# Patient Record
Sex: FEMALE | Race: OTHER | HISPANIC OR LATINO | URBAN - METROPOLITAN AREA
[De-identification: names, ages, dates, MRNs, and addresses within clinical notes are randomized per-mention and may not be internally consistent; named-entity substitution may affect disease eponyms.]

---

## 2017-06-10 ENCOUNTER — EMERGENCY (EMERGENCY)
Facility: HOSPITAL | Age: 47
LOS: 1 days | Discharge: HOME | End: 2017-06-10
Attending: EMERGENCY MEDICINE
Payer: MEDICAID

## 2017-06-10 VITALS
SYSTOLIC BLOOD PRESSURE: 128 MMHG | TEMPERATURE: 98 F | DIASTOLIC BLOOD PRESSURE: 78 MMHG | RESPIRATION RATE: 16 BRPM | OXYGEN SATURATION: 100 % | WEIGHT: 184.97 LBS | HEIGHT: 64 IN | HEART RATE: 65 BPM

## 2017-06-10 VITALS
HEART RATE: 90 BPM | TEMPERATURE: 100 F | DIASTOLIC BLOOD PRESSURE: 723 MMHG | RESPIRATION RATE: 18 BRPM | SYSTOLIC BLOOD PRESSURE: 132 MMHG | OXYGEN SATURATION: 100 %

## 2017-06-10 DIAGNOSIS — I10 ESSENTIAL (PRIMARY) HYPERTENSION: ICD-10-CM

## 2017-06-10 DIAGNOSIS — G43.919 MIGRAINE, UNSPECIFIED, INTRACTABLE, WITHOUT STATUS MIGRAINOSUS: ICD-10-CM

## 2017-06-10 DIAGNOSIS — Z98.89 OTHER SPECIFIED POSTPROCEDURAL STATES: Chronic | ICD-10-CM

## 2017-06-10 DIAGNOSIS — Z98.890 OTHER SPECIFIED POSTPROCEDURAL STATES: ICD-10-CM

## 2017-06-10 DIAGNOSIS — R19.7 DIARRHEA, UNSPECIFIED: ICD-10-CM

## 2017-06-10 PROCEDURE — 36000 PLACE NEEDLE IN VEIN: CPT

## 2017-06-10 PROCEDURE — 99284 EMERGENCY DEPT VISIT MOD MDM: CPT

## 2017-06-10 PROCEDURE — 96372 THER/PROPH/DIAG INJ SC/IM: CPT

## 2017-06-10 PROCEDURE — 99283 EMERGENCY DEPT VISIT LOW MDM: CPT | Mod: 25

## 2017-06-10 RX ORDER — METOCLOPRAMIDE HCL 10 MG
10 TABLET ORAL ONCE
Qty: 0 | Refills: 0 | Status: COMPLETED | OUTPATIENT
Start: 2017-06-10 | End: 2017-06-10

## 2017-06-10 RX ORDER — DIPHENHYDRAMINE HCL 50 MG
50 CAPSULE ORAL ONCE
Qty: 0 | Refills: 0 | Status: COMPLETED | OUTPATIENT
Start: 2017-06-10 | End: 2017-06-10

## 2017-06-10 RX ADMIN — Medication 10 MILLIGRAM(S): at 14:36

## 2017-06-10 RX ADMIN — Medication 50 MILLIGRAM(S): at 15:42

## 2017-06-10 NOTE — ED PROVIDER NOTE - NS ED MD SCRIBE ATTENDING SCRIBE SECTIONS
VITAL SIGNS( Pullset)/PAST MEDICAL/SURGICAL/SOCIAL HISTORY/HISTORY OF PRESENT ILLNESS/HIV/PHYSICAL EXAM/REVIEW OF SYSTEMS/DISPOSITION

## 2017-06-10 NOTE — ED PROVIDER NOTE - CHPI ED SYMPTOMS NEG
no vomiting, CP, SOB, LOC, diaphoresis, dizziness, visual aura, neck stiffness, changes in speech/vision/hearing, numbness, weakness, tingling

## 2017-06-10 NOTE — ED PROVIDER NOTE - OBJECTIVE STATEMENT
46 y/o female with significant PMHx of HTN and migraines presents to the ED c/o L sided HA. Pt reports similar HA in the past; took Tylenol to no relief. Pt also reports nausea and diarrhea. Pt reports mild photo and phonophobia. Pt denies vomiting, CP, SOB, LOC, diaphoresis, dizziness, visual aura, neck stiffness, changes in speech/vision/hearing, numbness, weakness, tingling, use of drugs/ETOH/cigarettes, or any other complaints.

## 2018-04-20 PROBLEM — Z00.00 ENCOUNTER FOR PREVENTIVE HEALTH EXAMINATION: Status: ACTIVE | Noted: 2018-04-20

## 2018-04-24 ENCOUNTER — OUTPATIENT (OUTPATIENT)
Dept: OUTPATIENT SERVICES | Facility: HOSPITAL | Age: 48
LOS: 1 days | End: 2018-04-24

## 2018-04-24 ENCOUNTER — APPOINTMENT (OUTPATIENT)
Dept: OBGYN | Facility: HOSPITAL | Age: 48
End: 2018-04-24
Payer: MEDICAID

## 2018-04-24 ENCOUNTER — LABORATORY RESULT (OUTPATIENT)
Age: 48
End: 2018-04-24

## 2018-04-24 ENCOUNTER — RESULT REVIEW (OUTPATIENT)
Age: 48
End: 2018-04-24

## 2018-04-24 VITALS
SYSTOLIC BLOOD PRESSURE: 111 MMHG | HEART RATE: 76 BPM | HEIGHT: 60 IN | WEIGHT: 176 LBS | DIASTOLIC BLOOD PRESSURE: 69 MMHG | BODY MASS INDEX: 34.55 KG/M2

## 2018-04-24 DIAGNOSIS — Z80.3 FAMILY HISTORY OF MALIGNANT NEOPLASM OF BREAST: ICD-10-CM

## 2018-04-24 DIAGNOSIS — Z86.79 PERSONAL HISTORY OF OTHER DISEASES OF THE CIRCULATORY SYSTEM: ICD-10-CM

## 2018-04-24 DIAGNOSIS — Z98.89 OTHER SPECIFIED POSTPROCEDURAL STATES: Chronic | ICD-10-CM

## 2018-04-24 PROCEDURE — 99203 OFFICE O/P NEW LOW 30 MIN: CPT | Mod: 25,GC

## 2018-04-25 DIAGNOSIS — Z80.3 FAMILY HISTORY OF MALIGNANT NEOPLASM OF BREAST: ICD-10-CM

## 2018-04-25 DIAGNOSIS — Z86.59 PERSONAL HISTORY OF OTHER MENTAL AND BEHAVIORAL DISORDERS: ICD-10-CM

## 2018-04-25 DIAGNOSIS — Z87.19 PERSONAL HISTORY OF OTHER DISEASES OF THE DIGESTIVE SYSTEM: ICD-10-CM

## 2018-04-25 DIAGNOSIS — Z01.419 ENCOUNTER FOR GYNECOLOGICAL EXAMINATION (GENERAL) (ROUTINE) WITHOUT ABNORMAL FINDINGS: ICD-10-CM

## 2018-04-25 DIAGNOSIS — G43.909 MIGRAINE, UNSPECIFIED, NOT INTRACTABLE, WITHOUT STATUS MIGRAINOSUS: ICD-10-CM

## 2018-04-25 DIAGNOSIS — Z86.69 PERSONAL HISTORY OF OTHER DISEASES OF THE NERVOUS SYSTEM AND SENSE ORGANS: ICD-10-CM

## 2018-04-25 DIAGNOSIS — Z86.79 PERSONAL HISTORY OF OTHER DISEASES OF THE CIRCULATORY SYSTEM: ICD-10-CM

## 2018-04-25 LAB
FSH SERPL-MCNC: 1.7 IU/L — SIGNIFICANT CHANGE UP
HBV SURFACE AG SER-ACNC: NONREACTIVE — SIGNIFICANT CHANGE UP
HIV 1+2 AB+HIV1 P24 AG SERPL QL IA: SIGNIFICANT CHANGE UP
HPV HIGH+LOW RISK DNA PNL CVX: SIGNIFICANT CHANGE UP

## 2018-04-26 LAB
C TRACH RRNA SPEC QL NAA+PROBE: SIGNIFICANT CHANGE UP
N GONORRHOEA RRNA SPEC QL NAA+PROBE: SIGNIFICANT CHANGE UP
SPECIMEN SOURCE: SIGNIFICANT CHANGE UP

## 2018-04-27 LAB — CYTOLOGY SPEC DOC CYTO: SIGNIFICANT CHANGE UP

## 2018-05-14 ENCOUNTER — OUTPATIENT (OUTPATIENT)
Dept: OUTPATIENT SERVICES | Facility: HOSPITAL | Age: 48
LOS: 1 days | End: 2018-05-14
Payer: MEDICAID

## 2018-05-14 ENCOUNTER — APPOINTMENT (OUTPATIENT)
Dept: MAMMOGRAPHY | Facility: IMAGING CENTER | Age: 48
End: 2018-05-14

## 2018-05-14 DIAGNOSIS — Z00.8 ENCOUNTER FOR OTHER GENERAL EXAMINATION: ICD-10-CM

## 2018-05-14 DIAGNOSIS — Z98.89 OTHER SPECIFIED POSTPROCEDURAL STATES: Chronic | ICD-10-CM

## 2018-05-14 PROCEDURE — 77063 BREAST TOMOSYNTHESIS BI: CPT

## 2018-05-14 PROCEDURE — 77067 SCR MAMMO BI INCL CAD: CPT | Mod: 26

## 2018-05-14 PROCEDURE — 77063 BREAST TOMOSYNTHESIS BI: CPT | Mod: 26

## 2018-05-14 PROCEDURE — 77067 SCR MAMMO BI INCL CAD: CPT

## 2018-05-29 ENCOUNTER — OUTPATIENT (OUTPATIENT)
Dept: OUTPATIENT SERVICES | Facility: HOSPITAL | Age: 48
LOS: 1 days | End: 2018-05-29

## 2018-05-29 ENCOUNTER — APPOINTMENT (OUTPATIENT)
Dept: NEUROLOGY | Facility: HOSPITAL | Age: 48
End: 2018-05-29

## 2018-05-29 VITALS
SYSTOLIC BLOOD PRESSURE: 119 MMHG | BODY MASS INDEX: 34.95 KG/M2 | DIASTOLIC BLOOD PRESSURE: 71 MMHG | WEIGHT: 178 LBS | HEART RATE: 66 BPM | HEIGHT: 60 IN

## 2018-05-29 DIAGNOSIS — Z98.89 OTHER SPECIFIED POSTPROCEDURAL STATES: Chronic | ICD-10-CM

## 2018-05-29 RX ORDER — PAROXETINE HYDROCHLORIDE 10 MG/1
10 TABLET, FILM COATED ORAL
Refills: 0 | Status: ACTIVE | COMMUNITY

## 2018-05-29 RX ORDER — CLONAZEPAM 1 MG/1
1 TABLET ORAL
Refills: 0 | Status: ACTIVE | COMMUNITY

## 2018-05-29 RX ORDER — RANITIDINE 150 MG/1
150 TABLET ORAL
Refills: 0 | Status: ACTIVE | COMMUNITY

## 2018-05-29 RX ORDER — METOPROLOL TARTRATE 100 MG/1
100 TABLET, FILM COATED ORAL
Refills: 0 | Status: ACTIVE | COMMUNITY

## 2018-05-29 RX ORDER — IBUPROFEN 600 MG/1
600 TABLET, FILM COATED ORAL
Refills: 0 | Status: ACTIVE | COMMUNITY

## 2018-05-29 RX ORDER — CELECOXIB 200 MG/1
200 CAPSULE ORAL
Refills: 0 | Status: ACTIVE | COMMUNITY

## 2018-05-30 DIAGNOSIS — G44.40 DRUG-INDUCED HEADACHE, NOT ELSEWHERE CLASSIFIED, NOT INTRACTABLE: ICD-10-CM

## 2018-05-30 DIAGNOSIS — G43.909 MIGRAINE, UNSPECIFIED, NOT INTRACTABLE, WITHOUT STATUS MIGRAINOSUS: ICD-10-CM

## 2018-05-30 DIAGNOSIS — G43.709 CHRONIC MIGRAINE WITHOUT AURA, NOT INTRACTABLE, WITHOUT STATUS MIGRAINOSUS: ICD-10-CM

## 2018-06-04 ENCOUNTER — RX RENEWAL (OUTPATIENT)
Age: 48
End: 2018-06-04

## 2018-06-04 ENCOUNTER — OTHER (OUTPATIENT)
Age: 48
End: 2018-06-04

## 2018-06-19 ENCOUNTER — EMERGENCY (EMERGENCY)
Facility: HOSPITAL | Age: 48
LOS: 1 days | Discharge: HOME | End: 2018-06-19
Admitting: EMERGENCY MEDICINE
Payer: MEDICAID

## 2018-06-19 VITALS
HEART RATE: 70 BPM | TEMPERATURE: 98 F | DIASTOLIC BLOOD PRESSURE: 98 MMHG | SYSTOLIC BLOOD PRESSURE: 153 MMHG | OXYGEN SATURATION: 99 % | RESPIRATION RATE: 18 BRPM

## 2018-06-19 DIAGNOSIS — Z98.89 OTHER SPECIFIED POSTPROCEDURAL STATES: Chronic | ICD-10-CM

## 2018-06-19 PROCEDURE — 99284 EMERGENCY DEPT VISIT MOD MDM: CPT | Mod: 25

## 2018-06-19 NOTE — ED ADULT TRIAGE NOTE - CHIEF COMPLAINT QUOTE
pt c/o migraine since today with nausea. pt reports taking 3 doses of sumatriptan today without relief, last dose 7pm. reports pain feels like typical migraine, but will not go away today. denies visual disturbances or any additional symptoms. PMHX: migraine, htn, anxiety

## 2018-06-20 VITALS
OXYGEN SATURATION: 98 % | DIASTOLIC BLOOD PRESSURE: 78 MMHG | RESPIRATION RATE: 18 BRPM | SYSTOLIC BLOOD PRESSURE: 145 MMHG | HEART RATE: 76 BPM

## 2018-06-20 LAB
ALBUMIN SERPL ELPH-MCNC: 4.1 G/DL — SIGNIFICANT CHANGE UP (ref 3.3–5)
ALP SERPL-CCNC: 55 U/L — SIGNIFICANT CHANGE UP (ref 40–120)
ALT FLD-CCNC: 23 U/L — SIGNIFICANT CHANGE UP (ref 4–33)
AST SERPL-CCNC: 15 U/L — SIGNIFICANT CHANGE UP (ref 4–32)
BILIRUB SERPL-MCNC: 0.3 MG/DL — SIGNIFICANT CHANGE UP (ref 0.2–1.2)
BUN SERPL-MCNC: 18 MG/DL — SIGNIFICANT CHANGE UP (ref 7–23)
CALCIUM SERPL-MCNC: 8.8 MG/DL — SIGNIFICANT CHANGE UP (ref 8.4–10.5)
CHLORIDE SERPL-SCNC: 104 MMOL/L — SIGNIFICANT CHANGE UP (ref 98–107)
CO2 SERPL-SCNC: 26 MMOL/L — SIGNIFICANT CHANGE UP (ref 22–31)
CREAT SERPL-MCNC: 0.57 MG/DL — SIGNIFICANT CHANGE UP (ref 0.5–1.3)
GLUCOSE SERPL-MCNC: 102 MG/DL — HIGH (ref 70–99)
HCT VFR BLD CALC: 37.3 % — SIGNIFICANT CHANGE UP (ref 34.5–45)
HGB BLD-MCNC: 12.6 G/DL — SIGNIFICANT CHANGE UP (ref 11.5–15.5)
MCHC RBC-ENTMCNC: 26.3 PG — LOW (ref 27–34)
MCHC RBC-ENTMCNC: 33.8 % — SIGNIFICANT CHANGE UP (ref 32–36)
MCV RBC AUTO: 77.7 FL — LOW (ref 80–100)
NRBC # FLD: 0 — SIGNIFICANT CHANGE UP
PLATELET # BLD AUTO: 270 K/UL — SIGNIFICANT CHANGE UP (ref 150–400)
PMV BLD: 10.5 FL — SIGNIFICANT CHANGE UP (ref 7–13)
POTASSIUM SERPL-MCNC: 3.6 MMOL/L — SIGNIFICANT CHANGE UP (ref 3.5–5.3)
POTASSIUM SERPL-SCNC: 3.6 MMOL/L — SIGNIFICANT CHANGE UP (ref 3.5–5.3)
PROT SERPL-MCNC: 7.1 G/DL — SIGNIFICANT CHANGE UP (ref 6–8.3)
RBC # BLD: 4.8 M/UL — SIGNIFICANT CHANGE UP (ref 3.8–5.2)
RBC # FLD: 13.7 % — SIGNIFICANT CHANGE UP (ref 10.3–14.5)
SODIUM SERPL-SCNC: 141 MMOL/L — SIGNIFICANT CHANGE UP (ref 135–145)
WBC # BLD: 6.74 K/UL — SIGNIFICANT CHANGE UP (ref 3.8–10.5)
WBC # FLD AUTO: 6.74 K/UL — SIGNIFICANT CHANGE UP (ref 3.8–10.5)

## 2018-06-20 RX ORDER — ONDANSETRON 8 MG/1
4 TABLET, FILM COATED ORAL ONCE
Qty: 0 | Refills: 0 | Status: COMPLETED | OUTPATIENT
Start: 2018-06-20 | End: 2018-06-20

## 2018-06-20 RX ORDER — SODIUM CHLORIDE 9 MG/ML
1000 INJECTION INTRAMUSCULAR; INTRAVENOUS; SUBCUTANEOUS ONCE
Qty: 0 | Refills: 0 | Status: COMPLETED | OUTPATIENT
Start: 2018-06-20 | End: 2018-06-20

## 2018-06-20 RX ORDER — MECLIZINE HCL 12.5 MG
25 TABLET ORAL ONCE
Qty: 0 | Refills: 0 | Status: COMPLETED | OUTPATIENT
Start: 2018-06-20 | End: 2018-06-20

## 2018-06-20 RX ORDER — KETOROLAC TROMETHAMINE 30 MG/ML
30 SYRINGE (ML) INJECTION ONCE
Qty: 0 | Refills: 0 | Status: DISCONTINUED | OUTPATIENT
Start: 2018-06-20 | End: 2018-06-20

## 2018-06-20 RX ORDER — METOCLOPRAMIDE HCL 10 MG
10 TABLET ORAL ONCE
Qty: 0 | Refills: 0 | Status: COMPLETED | OUTPATIENT
Start: 2018-06-20 | End: 2018-06-20

## 2018-06-20 RX ADMIN — SODIUM CHLORIDE 1000 MILLILITER(S): 9 INJECTION INTRAMUSCULAR; INTRAVENOUS; SUBCUTANEOUS at 01:20

## 2018-06-20 RX ADMIN — Medication 25 MILLIGRAM(S): at 01:41

## 2018-06-20 RX ADMIN — Medication 10 MILLIGRAM(S): at 01:20

## 2018-06-20 RX ADMIN — ONDANSETRON 4 MILLIGRAM(S): 8 TABLET, FILM COATED ORAL at 01:20

## 2018-06-20 RX ADMIN — Medication 30 MILLIGRAM(S): at 01:20

## 2018-06-20 NOTE — ED ADULT NURSE NOTE - OBJECTIVE STATEMENT
Patient received in intake #5 c/o migraines starting today. Patient primarily French speaking, requesting son at bedside for translation. Patient reports hx. migraines and had Botox injections, last one 2 weeks ago. Patient c/o dizziness. VS as noted. 20G IV Placed in right ac, labs drawn and sent. will monitor.

## 2018-06-20 NOTE — ED PROVIDER NOTE - OBJECTIVE STATEMENT
47 yo F hx of migraines (triptan and botox fu neuro) here for migraine x today. pt reports starting at 10 AM she felt like she was getting a migraine, took her triptan x 3 with no improvement. mild nausea associated. feels like her typical migraine. denies head injury or trauma. denies fever chills neck pain dizziness vomiting numbness tingling. denies recent injections. scheduled for LP on 6/27.

## 2018-06-20 NOTE — ED PROVIDER NOTE - MEDICAL DECISION MAKING DETAILS
49 yo F here for migraine, hx of migraines, feels similar to previous HA. no red flags. no neuro findings. PLAN: basic labs, fluids, meds, fu neuro

## 2018-06-21 ENCOUNTER — OTHER (OUTPATIENT)
Age: 48
End: 2018-06-21

## 2018-06-21 ENCOUNTER — OUTPATIENT (OUTPATIENT)
Dept: OUTPATIENT SERVICES | Facility: HOSPITAL | Age: 48
LOS: 1 days | End: 2018-06-21

## 2018-06-21 ENCOUNTER — APPOINTMENT (OUTPATIENT)
Dept: GASTROENTEROLOGY | Facility: HOSPITAL | Age: 48
End: 2018-06-21

## 2018-06-21 VITALS
SYSTOLIC BLOOD PRESSURE: 118 MMHG | HEIGHT: 60 IN | DIASTOLIC BLOOD PRESSURE: 88 MMHG | WEIGHT: 175 LBS | BODY MASS INDEX: 34.36 KG/M2

## 2018-06-21 DIAGNOSIS — Z12.11 ENCOUNTER FOR SCREENING FOR MALIGNANT NEOPLASM OF COLON: ICD-10-CM

## 2018-06-21 DIAGNOSIS — E66.9 OBESITY, UNSPECIFIED: ICD-10-CM

## 2018-06-21 DIAGNOSIS — Z98.89 OTHER SPECIFIED POSTPROCEDURAL STATES: Chronic | ICD-10-CM

## 2018-06-21 RX ORDER — POLYETHYLENE GLYCOL 3350 AND ELECTROLYTES WITH LEMON FLAVOR 236; 22.74; 6.74; 5.86; 2.97 G/4L; G/4L; G/4L; G/4L; G/4L
236 POWDER, FOR SOLUTION ORAL
Qty: 1 | Refills: 0 | Status: ACTIVE | COMMUNITY
Start: 2018-06-21 | End: 1900-01-01

## 2018-07-02 ENCOUNTER — FORM ENCOUNTER (OUTPATIENT)
Age: 48
End: 2018-07-02

## 2018-07-03 ENCOUNTER — MESSAGE (OUTPATIENT)
Age: 48
End: 2018-07-03

## 2018-07-03 ENCOUNTER — APPOINTMENT (OUTPATIENT)
Dept: RADIOLOGY | Facility: CLINIC | Age: 48
End: 2018-07-03
Payer: MEDICAID

## 2018-07-03 ENCOUNTER — OUTPATIENT (OUTPATIENT)
Dept: OUTPATIENT SERVICES | Facility: HOSPITAL | Age: 48
LOS: 1 days | End: 2018-07-03
Payer: MEDICAID

## 2018-07-03 DIAGNOSIS — Z98.89 OTHER SPECIFIED POSTPROCEDURAL STATES: Chronic | ICD-10-CM

## 2018-07-03 DIAGNOSIS — Z00.8 ENCOUNTER FOR OTHER GENERAL EXAMINATION: ICD-10-CM

## 2018-07-03 PROCEDURE — 62270 DX LMBR SPI PNXR: CPT

## 2018-07-03 PROCEDURE — 77003 FLUOROGUIDE FOR SPINE INJECT: CPT

## 2018-07-03 PROCEDURE — 77003 FLUOROGUIDE FOR SPINE INJECT: CPT | Mod: 26

## 2018-07-11 ENCOUNTER — EMERGENCY (EMERGENCY)
Facility: HOSPITAL | Age: 48
LOS: 1 days | Discharge: HOME | End: 2018-07-11
Attending: EMERGENCY MEDICINE | Admitting: EMERGENCY MEDICINE
Payer: MEDICAID

## 2018-07-11 VITALS
DIASTOLIC BLOOD PRESSURE: 78 MMHG | OXYGEN SATURATION: 100 % | RESPIRATION RATE: 16 BRPM | TEMPERATURE: 98 F | SYSTOLIC BLOOD PRESSURE: 124 MMHG | HEART RATE: 66 BPM

## 2018-07-11 DIAGNOSIS — Z98.89 OTHER SPECIFIED POSTPROCEDURAL STATES: Chronic | ICD-10-CM

## 2018-07-11 LAB
ALBUMIN SERPL ELPH-MCNC: 4 G/DL — SIGNIFICANT CHANGE UP (ref 3.3–5)
ALP SERPL-CCNC: 56 U/L — SIGNIFICANT CHANGE UP (ref 40–120)
ALT FLD-CCNC: 20 U/L — SIGNIFICANT CHANGE UP (ref 4–33)
AST SERPL-CCNC: 14 U/L — SIGNIFICANT CHANGE UP (ref 4–32)
BASOPHILS # BLD AUTO: 0.04 K/UL — SIGNIFICANT CHANGE UP (ref 0–0.2)
BASOPHILS NFR BLD AUTO: 0.6 % — SIGNIFICANT CHANGE UP (ref 0–2)
BILIRUB SERPL-MCNC: 0.3 MG/DL — SIGNIFICANT CHANGE UP (ref 0.2–1.2)
BUN SERPL-MCNC: 24 MG/DL — HIGH (ref 7–23)
CALCIUM SERPL-MCNC: 8.7 MG/DL — SIGNIFICANT CHANGE UP (ref 8.4–10.5)
CHLORIDE SERPL-SCNC: 112 MMOL/L — HIGH (ref 98–107)
CO2 SERPL-SCNC: 21 MMOL/L — LOW (ref 22–31)
CREAT SERPL-MCNC: 0.85 MG/DL — SIGNIFICANT CHANGE UP (ref 0.5–1.3)
EOSINOPHIL # BLD AUTO: 0.3 K/UL — SIGNIFICANT CHANGE UP (ref 0–0.5)
EOSINOPHIL NFR BLD AUTO: 4.3 % — SIGNIFICANT CHANGE UP (ref 0–6)
GLUCOSE SERPL-MCNC: 110 MG/DL — HIGH (ref 70–99)
HCT VFR BLD CALC: 36.6 % — SIGNIFICANT CHANGE UP (ref 34.5–45)
HGB BLD-MCNC: 12.4 G/DL — SIGNIFICANT CHANGE UP (ref 11.5–15.5)
IMM GRANULOCYTES # BLD AUTO: 0.01 # — SIGNIFICANT CHANGE UP
IMM GRANULOCYTES NFR BLD AUTO: 0.1 % — SIGNIFICANT CHANGE UP (ref 0–1.5)
LYMPHOCYTES # BLD AUTO: 2.31 K/UL — SIGNIFICANT CHANGE UP (ref 1–3.3)
LYMPHOCYTES # BLD AUTO: 33.3 % — SIGNIFICANT CHANGE UP (ref 13–44)
MAGNESIUM SERPL-MCNC: 2 MG/DL — SIGNIFICANT CHANGE UP (ref 1.6–2.6)
MCHC RBC-ENTMCNC: 27.1 PG — SIGNIFICANT CHANGE UP (ref 27–34)
MCHC RBC-ENTMCNC: 33.9 % — SIGNIFICANT CHANGE UP (ref 32–36)
MCV RBC AUTO: 79.9 FL — LOW (ref 80–100)
MONOCYTES # BLD AUTO: 0.45 K/UL — SIGNIFICANT CHANGE UP (ref 0–0.9)
MONOCYTES NFR BLD AUTO: 6.5 % — SIGNIFICANT CHANGE UP (ref 2–14)
NEUTROPHILS # BLD AUTO: 3.82 K/UL — SIGNIFICANT CHANGE UP (ref 1.8–7.4)
NEUTROPHILS NFR BLD AUTO: 55.2 % — SIGNIFICANT CHANGE UP (ref 43–77)
NRBC # FLD: 0 — SIGNIFICANT CHANGE UP
PHOSPHATE SERPL-MCNC: 3.6 MG/DL — SIGNIFICANT CHANGE UP (ref 2.5–4.5)
PLATELET # BLD AUTO: 235 K/UL — SIGNIFICANT CHANGE UP (ref 150–400)
PMV BLD: 10.6 FL — SIGNIFICANT CHANGE UP (ref 7–13)
POTASSIUM SERPL-MCNC: 3.3 MMOL/L — LOW (ref 3.5–5.3)
POTASSIUM SERPL-SCNC: 3.3 MMOL/L — LOW (ref 3.5–5.3)
PROT SERPL-MCNC: 6.9 G/DL — SIGNIFICANT CHANGE UP (ref 6–8.3)
RBC # BLD: 4.58 M/UL — SIGNIFICANT CHANGE UP (ref 3.8–5.2)
RBC # FLD: 14.1 % — SIGNIFICANT CHANGE UP (ref 10.3–14.5)
SODIUM SERPL-SCNC: 143 MMOL/L — SIGNIFICANT CHANGE UP (ref 135–145)
WBC # BLD: 6.93 K/UL — SIGNIFICANT CHANGE UP (ref 3.8–10.5)
WBC # FLD AUTO: 6.93 K/UL — SIGNIFICANT CHANGE UP (ref 3.8–10.5)

## 2018-07-11 PROCEDURE — 99284 EMERGENCY DEPT VISIT MOD MDM: CPT

## 2018-07-11 RX ORDER — SODIUM CHLORIDE 9 MG/ML
1000 INJECTION INTRAMUSCULAR; INTRAVENOUS; SUBCUTANEOUS ONCE
Qty: 0 | Refills: 0 | Status: COMPLETED | OUTPATIENT
Start: 2018-07-11 | End: 2018-07-11

## 2018-07-11 RX ORDER — DEXAMETHASONE 0.5 MG/5ML
6 ELIXIR ORAL ONCE
Qty: 0 | Refills: 0 | Status: DISCONTINUED | OUTPATIENT
Start: 2018-07-11 | End: 2018-07-11

## 2018-07-11 RX ORDER — POTASSIUM CHLORIDE 20 MEQ
40 PACKET (EA) ORAL ONCE
Qty: 0 | Refills: 0 | Status: COMPLETED | OUTPATIENT
Start: 2018-07-11 | End: 2018-07-11

## 2018-07-11 RX ORDER — ACETAMINOPHEN 500 MG
650 TABLET ORAL ONCE
Qty: 0 | Refills: 0 | Status: COMPLETED | OUTPATIENT
Start: 2018-07-11 | End: 2018-07-11

## 2018-07-11 RX ORDER — POTASSIUM CHLORIDE 20 MEQ
40 PACKET (EA) ORAL ONCE
Qty: 0 | Refills: 0 | Status: DISCONTINUED | OUTPATIENT
Start: 2018-07-11 | End: 2018-07-11

## 2018-07-11 RX ORDER — METOCLOPRAMIDE HCL 10 MG
10 TABLET ORAL ONCE
Qty: 0 | Refills: 0 | Status: COMPLETED | OUTPATIENT
Start: 2018-07-11 | End: 2018-07-11

## 2018-07-11 RX ORDER — DIPHENHYDRAMINE HCL 50 MG
25 CAPSULE ORAL ONCE
Qty: 0 | Refills: 0 | Status: DISCONTINUED | OUTPATIENT
Start: 2018-07-11 | End: 2018-07-11

## 2018-07-11 RX ADMIN — Medication 10 MILLIGRAM(S): at 14:58

## 2018-07-11 RX ADMIN — Medication 40 MILLIEQUIVALENT(S): at 15:59

## 2018-07-11 RX ADMIN — SODIUM CHLORIDE 1000 MILLILITER(S): 9 INJECTION INTRAMUSCULAR; INTRAVENOUS; SUBCUTANEOUS at 14:58

## 2018-07-11 NOTE — ED PROVIDER NOTE - NEUROLOGICAL, MLM
Alert and oriented, no focal deficits, no motor or sensory deficits. No drift, no dysmetria. CN II-XII intact

## 2018-07-11 NOTE — ED PROVIDER NOTE - ATTENDING CONTRIBUTION TO CARE
Pt was seen and evaluated by me. Pt is a 49 y/o female with PMHx of HTN, migraines, anxiety, and depression presenting to the ED for headache today. Pt states starting this morning having left sided headache typical of her migraines. Pt states taking some naproxen and acetazolamide with some relief. Pt denies any vision changes, fever, chills, nausea, vomiting, SOB, chest pain, or abd pain. Lungs CTA b/l. RRR. Abd soft, non-tender. No focal deficits.

## 2018-07-11 NOTE — ED PROVIDER NOTE - PROGRESS NOTE DETAILS
[facility] PGY2: Pt reassessed- headache 0/10, no neck pain, no dizziness, feels comfortable returning home, ekg was intended to be cancelled/ erroneously marked off as complete- pt without cp/ sob/ cardiac symptoms.

## 2018-07-11 NOTE — ED PROVIDER NOTE - MEDICAL DECISION MAKING DETAILS
48F with headache typical of prev migraines, rule out electrolyte abnormalities with labs, IVF, reglan, reassess

## 2018-07-11 NOTE — ED PROVIDER NOTE - PLAN OF CARE
You were seen today for headache, your symptoms resolved with fluids and reglan 10mg, and we gave you potassium because your potassium was a little low. Return for worsening headache, numbness or weakness or chest pain.   1) Please follow-up with your primary care doctor within the next 3 days.  Please call today or tomorrow for an appointment.  If you cannot follow-up with your doctor(s), please return to the ED for any urgent issues.  2) If you have any worsening of symptoms or any other concerns please return to the ED immediately.  3) Please continue taking your home medications as directed.  4) You may have been given a copy of your labs and/or imaging.  Please go over these with your primary care doctor.

## 2018-07-11 NOTE — ED PROVIDER NOTE - OBJECTIVE STATEMENT
48F hx of HTN, anxiety/depression, migraines c/o of typical migraine, left sided, squeezing sensation with pain along the left side of the neck, onset 9am today, took naproxen 100mg at approx 9am and acetazolamide 500mg, with some relief from 10/10 pain to 8/10 pain. No fevers/chills/ recent trauma/ neck stiffness/ numbness/tingling/ motor/ sensory deficits. Endorses some nausea without emesis. No visual changes, no increased sensitivity to light, no dizziness or hearing changes.    Other meds: clonazepam , metoprolol, prev had botox of the neck area and forehead for migraine tx, trialled on triptans but has since stopped them due to ineffectiveness.

## 2018-07-11 NOTE — ED PROVIDER NOTE - CARE PLAN
Principal Discharge DX:	Migraine without aura and without status migrainosus, not intractable Principal Discharge DX:	Migraine without aura and without status migrainosus, not intractable  Assessment and plan of treatment:	You were seen today for headache, your symptoms resolved with fluids and reglan 10mg, and we gave you potassium because your potassium was a little low. Return for worsening headache, numbness or weakness or chest pain.   1) Please follow-up with your primary care doctor within the next 3 days.  Please call today or tomorrow for an appointment.  If you cannot follow-up with your doctor(s), please return to the ED for any urgent issues.  2) If you have any worsening of symptoms or any other concerns please return to the ED immediately.  3) Please continue taking your home medications as directed.  4) You may have been given a copy of your labs and/or imaging.  Please go over these with your primary care doctor.

## 2018-07-24 ENCOUNTER — APPOINTMENT (OUTPATIENT)
Dept: NEUROLOGY | Facility: HOSPITAL | Age: 48
End: 2018-07-24

## 2018-07-24 ENCOUNTER — OUTPATIENT (OUTPATIENT)
Dept: OUTPATIENT SERVICES | Facility: HOSPITAL | Age: 48
LOS: 1 days | End: 2018-07-24

## 2018-07-24 VITALS
SYSTOLIC BLOOD PRESSURE: 128 MMHG | BODY MASS INDEX: 35.14 KG/M2 | DIASTOLIC BLOOD PRESSURE: 87 MMHG | HEART RATE: 74 BPM | WEIGHT: 179 LBS | HEIGHT: 60 IN

## 2018-07-24 DIAGNOSIS — Z98.89 OTHER SPECIFIED POSTPROCEDURAL STATES: Chronic | ICD-10-CM

## 2018-07-25 DIAGNOSIS — G43.909 MIGRAINE, UNSPECIFIED, NOT INTRACTABLE, WITHOUT STATUS MIGRAINOSUS: ICD-10-CM

## 2018-07-25 DIAGNOSIS — M18.10 UNILATERAL PRIMARY OSTEOARTHRITIS OF FIRST CARPOMETACARPAL JOINT, UNSPECIFIED HAND: ICD-10-CM

## 2018-08-18 ENCOUNTER — EMERGENCY (EMERGENCY)
Facility: HOSPITAL | Age: 48
LOS: 1 days | Discharge: HOME | End: 2018-08-18
Attending: EMERGENCY MEDICINE | Admitting: EMERGENCY MEDICINE
Payer: MEDICAID

## 2018-08-18 VITALS
SYSTOLIC BLOOD PRESSURE: 144 MMHG | HEART RATE: 100 BPM | RESPIRATION RATE: 19 BRPM | TEMPERATURE: 99 F | OXYGEN SATURATION: 100 % | DIASTOLIC BLOOD PRESSURE: 92 MMHG

## 2018-08-18 DIAGNOSIS — Z98.89 OTHER SPECIFIED POSTPROCEDURAL STATES: Chronic | ICD-10-CM

## 2018-08-18 PROCEDURE — 99284 EMERGENCY DEPT VISIT MOD MDM: CPT | Mod: 25

## 2018-08-18 NOTE — ED ADULT TRIAGE NOTE - CHIEF COMPLAINT QUOTE
Rt sided headache since 1900 tonight accompanied with nausea. no nuchal rigidity observed. States no relief with meds. Also endorses took prescribed zoloft for first time today @ 0900. HX Migraines, HTN, Anxiety

## 2018-08-19 VITALS
DIASTOLIC BLOOD PRESSURE: 70 MMHG | OXYGEN SATURATION: 100 % | HEART RATE: 70 BPM | RESPIRATION RATE: 16 BRPM | SYSTOLIC BLOOD PRESSURE: 104 MMHG

## 2018-08-19 RX ORDER — SODIUM CHLORIDE 9 MG/ML
1000 INJECTION INTRAMUSCULAR; INTRAVENOUS; SUBCUTANEOUS ONCE
Qty: 0 | Refills: 0 | Status: COMPLETED | OUTPATIENT
Start: 2018-08-19 | End: 2018-08-19

## 2018-08-19 RX ORDER — KETOROLAC TROMETHAMINE 30 MG/ML
15 SYRINGE (ML) INJECTION ONCE
Qty: 0 | Refills: 0 | Status: DISCONTINUED | OUTPATIENT
Start: 2018-08-19 | End: 2018-08-19

## 2018-08-19 RX ORDER — DIPHENHYDRAMINE HCL 50 MG
50 CAPSULE ORAL ONCE
Qty: 0 | Refills: 0 | Status: COMPLETED | OUTPATIENT
Start: 2018-08-19 | End: 2018-08-19

## 2018-08-19 RX ORDER — ACETAMINOPHEN 500 MG
650 TABLET ORAL ONCE
Qty: 0 | Refills: 0 | Status: COMPLETED | OUTPATIENT
Start: 2018-08-19 | End: 2018-08-19

## 2018-08-19 RX ORDER — METOCLOPRAMIDE HCL 10 MG
10 TABLET ORAL ONCE
Qty: 0 | Refills: 0 | Status: COMPLETED | OUTPATIENT
Start: 2018-08-19 | End: 2018-08-19

## 2018-08-19 RX ADMIN — Medication 10 MILLIGRAM(S): at 02:02

## 2018-08-19 RX ADMIN — Medication 650 MILLIGRAM(S): at 02:02

## 2018-08-19 RX ADMIN — Medication 15 MILLIGRAM(S): at 02:03

## 2018-08-19 RX ADMIN — Medication 50 MILLIGRAM(S): at 02:02

## 2018-08-19 RX ADMIN — Medication 15 MILLIGRAM(S): at 02:02

## 2018-08-19 RX ADMIN — SODIUM CHLORIDE 1000 MILLILITER(S): 9 INJECTION INTRAMUSCULAR; INTRAVENOUS; SUBCUTANEOUS at 02:03

## 2018-08-19 NOTE — ED ADULT NURSE NOTE - NSIMPLEMENTINTERV_GEN_ALL_ED
Implemented All Fall with Harm Risk Interventions:  Home to call system. Call bell, personal items and telephone within reach. Instruct patient to call for assistance. Room bathroom lighting operational. Non-slip footwear when patient is off stretcher. Physically safe environment: no spills, clutter or unnecessary equipment. Stretcher in lowest position, wheels locked, appropriate side rails in place. Provide visual cue, wrist band, yellow gown, etc. Monitor gait and stability. Monitor for mental status changes and reorient to person, place, and time. Review medications for side effects contributing to fall risk. Reinforce activity limits and safety measures with patient and family. Provide visual clues: red socks.

## 2018-08-19 NOTE — ED PROVIDER NOTE - PLAN OF CARE
For pain take Ibuprofen (Motrin, Advil) 400mg-600mg every 6-8 hours or Acetaminophen (Tylenol) 250mg-650mg every 6-8 hours.  Follow up with your primary physician in 3-4 days. If needed call [phone] to find a primary care physician or call  [phone] to schedule an appointment with the general medicine clinic.   You were given copies of all labs and imaging results from this ER visit, please take them to your appointment.  Return to the ER for worsening symptoms or any other concerns.

## 2018-08-19 NOTE — ED ADULT NURSE NOTE - OBJECTIVE STATEMENT
The patient is a 47 y/o Indian speaking female (translation services offered, patient refused requesting son to translate at bedside), a&0x4 p/w a c/c of right sided HA that began at 1900 hours.  Patient reported a hx of migraines, reports taking prescribed medication with no relief.  patient unable to elaborate on the name of the medication.  Patient denies fevers/chills, nuchal rigidity, SOB, CP, abd pain, GI/ symptoms.  Patient waiting to be seen by MD.

## 2018-08-19 NOTE — ED PROVIDER NOTE - OBJECTIVE STATEMENT
48F h/o migraines, HTN presents with HA.  Reports intermittent HA since Thursday.  HA is diffuse, gradual in onset.  Became more severe this morning. Tried indomethacin today without relief. Patient states HA is similar to prior migraines but more severe.  States she has a neurologist and has had multiple imaging studies, most recently an MRV.  No fever. No dizziness. No vision change, + photophobia. No n/v.

## 2018-08-19 NOTE — ED PROVIDER NOTE - MEDICAL DECISION MAKING DETAILS
48F intermittent HA similar to prior migraine, gradual in onset, no focal neuro symptoms. Plan for migraine treatment, re-eval.

## 2018-08-19 NOTE — ED PROVIDER NOTE - CARE PLAN
Principal Discharge DX:	Headache  Assessment and plan of treatment:	For pain take Ibuprofen (Motrin, Advil) 400mg-600mg every 6-8 hours or Acetaminophen (Tylenol) 250mg-650mg every 6-8 hours.  Follow up with your primary physician in 3-4 days. If needed call [phone] to find a primary care physician or call  [phone] to schedule an appointment with the general medicine clinic.   You were given copies of all labs and imaging results from this ER visit, please take them to your appointment.  Return to the ER for worsening symptoms or any other concerns.

## 2018-08-21 ENCOUNTER — APPOINTMENT (OUTPATIENT)
Dept: NEUROLOGY | Facility: HOSPITAL | Age: 48
End: 2018-08-21

## 2018-08-21 ENCOUNTER — OUTPATIENT (OUTPATIENT)
Dept: OUTPATIENT SERVICES | Facility: HOSPITAL | Age: 48
LOS: 1 days | End: 2018-08-21

## 2018-08-21 VITALS
DIASTOLIC BLOOD PRESSURE: 83 MMHG | HEIGHT: 60 IN | WEIGHT: 177 LBS | SYSTOLIC BLOOD PRESSURE: 130 MMHG | HEART RATE: 67 BPM | BODY MASS INDEX: 34.75 KG/M2

## 2018-08-21 DIAGNOSIS — Z98.89 OTHER SPECIFIED POSTPROCEDURAL STATES: Chronic | ICD-10-CM

## 2018-08-21 RX ORDER — ZONISAMIDE 100 MG/1
100 CAPSULE ORAL AT BEDTIME
Qty: 30 | Refills: 2 | Status: ACTIVE | COMMUNITY
Start: 2018-08-21

## 2018-08-21 RX ORDER — RIZATRIPTAN BENZOATE 10 MG/1
10 TABLET ORAL
Refills: 0 | Status: DISCONTINUED | COMMUNITY
End: 2018-08-21

## 2018-08-21 RX ORDER — METHYLPREDNISOLONE 4 MG/1
4 TABLET ORAL
Qty: 1 | Refills: 0 | Status: DISCONTINUED | COMMUNITY
Start: 2018-05-29 | End: 2018-08-21

## 2018-08-22 DIAGNOSIS — G44.52 NEW DAILY PERSISTENT HEADACHE (NDPH): ICD-10-CM

## 2018-09-16 NOTE — ED ADULT NURSE NOTE - PAIN RATING/NUMBER SCALE (0-10): REST
8 I have reviewed and confirmed nurses' notes for patient's medications, allergies, medical history, and surgical history.

## 2018-11-27 ENCOUNTER — APPOINTMENT (OUTPATIENT)
Dept: NEUROLOGY | Facility: HOSPITAL | Age: 48
End: 2018-11-27

## 2019-01-23 ENCOUNTER — APPOINTMENT (OUTPATIENT)
Dept: ENDOCRINOLOGY | Facility: CLINIC | Age: 49
End: 2019-01-23
Payer: MEDICAID

## 2019-01-23 VITALS
RESPIRATION RATE: 16 BRPM | OXYGEN SATURATION: 97 % | SYSTOLIC BLOOD PRESSURE: 112 MMHG | WEIGHT: 186 LBS | DIASTOLIC BLOOD PRESSURE: 70 MMHG | HEART RATE: 79 BPM | TEMPERATURE: 98.3 F | BODY MASS INDEX: 36.52 KG/M2 | HEIGHT: 60 IN

## 2019-01-23 PROCEDURE — 99204 OFFICE O/P NEW MOD 45 MIN: CPT

## 2019-01-23 RX ORDER — DEXAMETHASONE 1 MG/1
1 TABLET ORAL
Qty: 1 | Refills: 1 | Status: ACTIVE | COMMUNITY
Start: 2019-01-23 | End: 1900-01-01

## 2019-01-23 RX ORDER — INDOMETHACIN 50 MG/1
50 CAPSULE ORAL
Refills: 0 | Status: ACTIVE | COMMUNITY

## 2019-01-23 RX ORDER — SERTRALINE HYDROCHLORIDE 100 MG/1
100 TABLET, FILM COATED ORAL
Refills: 0 | Status: ACTIVE | COMMUNITY

## 2019-01-23 RX ORDER — OMEPRAZOLE 40 MG/1
40 CAPSULE, DELAYED RELEASE ORAL
Refills: 0 | Status: ACTIVE | COMMUNITY

## 2019-01-23 NOTE — ASSESSMENT
[FreeTextEntry1] : Patient has difficult losing weight\par She has irregular periods\par She feels tired\par Will rule out hypothyroidism\par Will order thyroid tests\par Will order US thyroid

## 2019-01-23 NOTE — HISTORY OF PRESENT ILLNESS
[FreeTextEntry1] : The patient comes to the office with a history of gaining weight, feeling tired for several years. She has difficulty losing weight. She wants to be checked for hormonal imbalance. She feels tired, sleepy, no cold intolerance, or muscle cramps, or constipation, or dryness of the skin, or hair loss. Her periods are irregular. She has gained weight. No history of a goiter or a thyroid nodule. No history of neck radiation. No difficulty swallowing, no hoarseness. She has no family history of thyroid disease. She history of migraines. She never had problems to become pregnant.\par

## 2019-01-23 NOTE — PHYSICAL EXAM
[Alert] : alert [No Acute Distress] : no acute distress [Normal Sclera/Conjunctiva] : normal sclera/conjunctiva [PERRL] : pupils equal, round and reactive to light [Normal Outer Ear/Nose] : the ears and nose were normal in appearance [Normal TMs] : both tympanic membranes were normal [No Neck Mass] : no neck mass was observed [Thyroid Not Enlarged] : the thyroid was not enlarged [No Respiratory Distress] : no respiratory distress [Normal Rate and Effort] : normal respiratory rhythm and effort [Normal PMI] : the apical impulse was normal [Normal Rate] : heart rate was normal  [Carotids Normal] : carotid pulses were normal with no bruits [Normal Reflexes] : deep tendon reflexes were 2+ and symmetric [No Motor Deficits] : the motor exam was normal

## 2019-01-27 DIAGNOSIS — Z86.69 PERSONAL HISTORY OF OTHER DISEASES OF THE NERVOUS SYSTEM AND SENSE ORGANS: ICD-10-CM

## 2019-01-27 DIAGNOSIS — Z01.419 ENCOUNTER FOR GYNECOLOGICAL EXAMINATION (GENERAL) (ROUTINE) W/OUT ABNORMAL FINDINGS: ICD-10-CM

## 2019-01-27 DIAGNOSIS — Z87.19 PERSONAL HISTORY OF OTHER DISEASES OF THE DIGESTIVE SYSTEM: ICD-10-CM

## 2019-01-27 DIAGNOSIS — Z12.12 ENCOUNTER FOR SCREENING FOR MALIGNANT NEOPLASM OF COLON: ICD-10-CM

## 2019-01-27 DIAGNOSIS — G44.52 NEW DAILY PERSISTENT HEADACHE (NDPH): ICD-10-CM

## 2019-01-27 DIAGNOSIS — Z12.11 ENCOUNTER FOR SCREENING FOR MALIGNANT NEOPLASM OF COLON: ICD-10-CM

## 2019-01-27 DIAGNOSIS — Z86.59 PERSONAL HISTORY OF OTHER MENTAL AND BEHAVIORAL DISORDERS: ICD-10-CM

## 2019-01-27 LAB
ANION GAP SERPL CALC-SCNC: 13 MMOL/L
BUN SERPL-MCNC: 14 MG/DL
C PEPTIDE SERPL-MCNC: 2.8 NG/ML
CALCIUM SERPL-MCNC: 9.6 MG/DL
CHLORIDE SERPL-SCNC: 102 MMOL/L
CHOLEST SERPL-MCNC: 123 MG/DL
CHOLEST/HDLC SERPL: 3.8 RATIO
CO2 SERPL-SCNC: 26 MMOL/L
CREAT SERPL-MCNC: 0.62 MG/DL
DHEA-S SERPL-MCNC: 21.1 UG/DL
ESTRADIOL SERPL-MCNC: 79 PG/ML
FSH SERPL-MCNC: 3 IU/L
GLUCOSE BS SERPL-MCNC: 86 MG/DL
GLUCOSE SERPL-MCNC: 85 MG/DL
HBA1C MFR BLD HPLC: 5.6 %
HDLC SERPL-MCNC: 32 MG/DL
LDLC SERPL CALC-MCNC: 79 MG/DL
LH SERPL-ACNC: 3.6 IU/L
POTASSIUM SERPL-SCNC: 3.9 MMOL/L
PROLACTIN SERPL-MCNC: 8 NG/ML
SODIUM SERPL-SCNC: 141 MMOL/L
T4 FREE SERPL-MCNC: 1.3 NG/DL
THYROPEROXIDASE AB SERPL IA-ACNC: <10 IU/ML
TRIGL SERPL-MCNC: 61 MG/DL
TSH SERPL-ACNC: 0.65 UIU/ML

## 2019-02-03 LAB
17OHP SERPL-MCNC: 126 NG/DL
TESTOST BND SERPL-MCNC: 0.3 PG/ML
TESTOST SERPL-MCNC: 12.3 NG/DL

## 2019-02-11 ENCOUNTER — LABORATORY RESULT (OUTPATIENT)
Age: 49
End: 2019-02-11

## 2019-02-12 ENCOUNTER — APPOINTMENT (OUTPATIENT)
Dept: OTOLARYNGOLOGY | Facility: CLINIC | Age: 49
End: 2019-02-12
Payer: MEDICAID

## 2019-02-12 VITALS
TEMPERATURE: 98 F | HEART RATE: 75 BPM | DIASTOLIC BLOOD PRESSURE: 85 MMHG | OXYGEN SATURATION: 97 % | BODY MASS INDEX: 36.91 KG/M2 | SYSTOLIC BLOOD PRESSURE: 133 MMHG | WEIGHT: 189 LBS

## 2019-02-12 DIAGNOSIS — Z78.9 OTHER SPECIFIED HEALTH STATUS: ICD-10-CM

## 2019-02-12 PROCEDURE — 10021 FNA BX W/O IMG GDN 1ST LES: CPT

## 2019-02-12 PROCEDURE — 99204 OFFICE O/P NEW MOD 45 MIN: CPT | Mod: 25

## 2019-02-12 NOTE — HISTORY OF PRESENT ILLNESS
[de-identified] : This is a patient referred by Dr Jeffries for thyroid nodule. This was found in January during physical exam/US.\par No dysphagia, dysphonia or dyspnea.\par Patient denies h/o radiation and has no family h/o thyroid cancer.\par US from [facility] 1/28/19 showed a 1 x 0.5 x 1 cm R thyroid nodule, 1.4 x 1.1 x 1.1 cm R thyroid nodule and small cystic nodules on the L side.\par

## 2019-02-12 NOTE — CONSULT LETTER
[Dear  ___] : Dear  [unfilled], [Consult Letter:] : I had the pleasure of evaluating your patient, [unfilled]. [Please see my note below.] : Please see my note below. [Consult Closing:] : Thank you very much for allowing me to participate in the care of this patient.  If you have any questions, please do not hesitate to contact me. [Sincerely,] : Sincerely, [FreeTextEntry2] : Dr Dino Jeffries [FreeTextEntry3] : \par Steve Lang MD, FACS\par \par Otolaryngology-Head and Neck Surgery\par [employer] [facility]\par

## 2019-02-26 ENCOUNTER — APPOINTMENT (OUTPATIENT)
Dept: OTOLARYNGOLOGY | Facility: CLINIC | Age: 49
End: 2019-02-26
Payer: MEDICAID

## 2019-02-26 VITALS
WEIGHT: 180 LBS | SYSTOLIC BLOOD PRESSURE: 142 MMHG | HEART RATE: 100 BPM | BODY MASS INDEX: 35.15 KG/M2 | TEMPERATURE: 98.5 F | DIASTOLIC BLOOD PRESSURE: 82 MMHG | OXYGEN SATURATION: 98 %

## 2019-02-26 PROCEDURE — 99214 OFFICE O/P EST MOD 30 MIN: CPT

## 2019-02-27 NOTE — HISTORY OF PRESENT ILLNESS
[de-identified] : This is a patient referred by Dr Jeffries for thyroid nodule. This was found in January during physical exam/US.\par No dysphagia, dysphonia or dyspnea.\par Patient denies h/o radiation and has no family h/o thyroid cancer.\par US from [facility] 1/28/19 showed a 1 x 0.5 x 1 cm R thyroid nodule, 1.4 x 1.1 x 1.1 cm R thyroid nodule and small cystic nodules on the L side.\par US guided FNA of the 1.3 cm R lower pole nodule was done in the office and path report was benign (category II).\par Complete review of systems which was performed during a previous encounter was reviewed with the patient and there are no changes except as stated in the HPI section.\par \par

## 2019-02-27 NOTE — CONSULT LETTER
[Dear  ___] : Dear  [unfilled], [Courtesy Letter:] : I had the pleasure of seeing your patient, [unfilled], in my office today. [Please see my note below.] : Please see my note below. [Sincerely,] : Sincerely, [FreeTextEntry2] : Dr Dino Jeffries [FreeTextEntry3] : \par Steve Lang MD, FACS\par \par Otolaryngology-Head and Neck Surgery\par [employer] [facility]\par  [DrJp  ___] : Dr. WHYTE

## 2019-03-20 ENCOUNTER — APPOINTMENT (OUTPATIENT)
Dept: ENDOCRINOLOGY | Facility: CLINIC | Age: 49
End: 2019-03-20
Payer: MEDICAID

## 2019-03-20 VITALS
HEART RATE: 80 BPM | DIASTOLIC BLOOD PRESSURE: 88 MMHG | RESPIRATION RATE: 16 BRPM | BODY MASS INDEX: 37.11 KG/M2 | HEIGHT: 60 IN | OXYGEN SATURATION: 97 % | TEMPERATURE: 98.1 F | WEIGHT: 189 LBS | SYSTOLIC BLOOD PRESSURE: 137 MMHG

## 2019-03-20 PROCEDURE — 99213 OFFICE O/P EST LOW 20 MIN: CPT

## 2019-03-20 NOTE — ASSESSMENT
[FreeTextEntry1] : Patient is clinically euthyroid\par The thyroid nodule is benign\par Will repeat the US thyroid and blood tests next visit

## 2019-03-20 NOTE — HISTORY OF PRESENT ILLNESS
[FreeTextEntry1] : Patient feels well, she is unable to lose weight, she is not exercising or following the diet. The FNA biopsy of the thyroid nodule was negative.

## 2019-03-20 NOTE — PHYSICAL EXAM
[Alert] : alert [No Acute Distress] : no acute distress [Normal Sclera/Conjunctiva] : normal sclera/conjunctiva [PERRL] : pupils equal, round and reactive to light [Normal Outer Ear/Nose] : the ears and nose were normal in appearance [Normal TMs] : both tympanic membranes were normal [No Neck Mass] : no neck mass was observed [Thyroid Not Enlarged] : the thyroid was not enlarged [No Respiratory Distress] : no respiratory distress [Normal Rate and Effort] : normal respiratory rhythm and effort [Normal PMI] : the apical impulse was normal [Normal Rate] : heart rate was normal  [Normal Reflexes] : deep tendon reflexes were 2+ and symmetric [No Motor Deficits] : the motor exam was normal

## 2019-03-22 ENCOUNTER — APPOINTMENT (OUTPATIENT)
Dept: ENDOCRINOLOGY | Facility: CLINIC | Age: 49
End: 2019-03-22
Payer: MEDICAID

## 2019-03-22 PROCEDURE — 97802 MEDICAL NUTRITION INDIV IN: CPT

## 2019-09-04 ENCOUNTER — EMERGENCY (EMERGENCY)
Facility: HOSPITAL | Age: 49
LOS: 1 days | Discharge: HOME | End: 2019-09-04
Attending: EMERGENCY MEDICINE
Payer: MEDICAID

## 2019-09-04 VITALS
DIASTOLIC BLOOD PRESSURE: 89 MMHG | HEIGHT: 59 IN | SYSTOLIC BLOOD PRESSURE: 133 MMHG | OXYGEN SATURATION: 96 % | RESPIRATION RATE: 16 BRPM | HEART RATE: 83 BPM | TEMPERATURE: 98 F | WEIGHT: 184.97 LBS

## 2019-09-04 DIAGNOSIS — Z98.89 OTHER SPECIFIED POSTPROCEDURAL STATES: Chronic | ICD-10-CM

## 2019-09-04 LAB
ALBUMIN SERPL ELPH-MCNC: 3.6 G/DL — SIGNIFICANT CHANGE UP (ref 3.5–5)
ALP SERPL-CCNC: 77 U/L — SIGNIFICANT CHANGE UP (ref 40–120)
ALT FLD-CCNC: 58 U/L DA — SIGNIFICANT CHANGE UP (ref 10–60)
ANION GAP SERPL CALC-SCNC: 5 MMOL/L — SIGNIFICANT CHANGE UP (ref 5–17)
AST SERPL-CCNC: 29 U/L — SIGNIFICANT CHANGE UP (ref 10–40)
BILIRUB SERPL-MCNC: 0.3 MG/DL — SIGNIFICANT CHANGE UP (ref 0.2–1.2)
BUN SERPL-MCNC: 17 MG/DL — SIGNIFICANT CHANGE UP (ref 7–18)
CALCIUM SERPL-MCNC: 8.8 MG/DL — SIGNIFICANT CHANGE UP (ref 8.4–10.5)
CHLORIDE SERPL-SCNC: 105 MMOL/L — SIGNIFICANT CHANGE UP (ref 96–108)
CO2 SERPL-SCNC: 30 MMOL/L — SIGNIFICANT CHANGE UP (ref 22–31)
CREAT SERPL-MCNC: 1.01 MG/DL — SIGNIFICANT CHANGE UP (ref 0.5–1.3)
GLUCOSE SERPL-MCNC: 98 MG/DL — SIGNIFICANT CHANGE UP (ref 70–99)
HCG SERPL-ACNC: <1 MIU/ML — SIGNIFICANT CHANGE UP
HCT VFR BLD CALC: 39.1 % — SIGNIFICANT CHANGE UP (ref 34.5–45)
HGB BLD-MCNC: 12.6 G/DL — SIGNIFICANT CHANGE UP (ref 11.5–15.5)
MCHC RBC-ENTMCNC: 26.6 PG — LOW (ref 27–34)
MCHC RBC-ENTMCNC: 32.2 GM/DL — SIGNIFICANT CHANGE UP (ref 32–36)
MCV RBC AUTO: 82.5 FL — SIGNIFICANT CHANGE UP (ref 80–100)
NRBC # BLD: 0 /100 WBCS — SIGNIFICANT CHANGE UP (ref 0–0)
PLATELET # BLD AUTO: 260 K/UL — SIGNIFICANT CHANGE UP (ref 150–400)
POTASSIUM SERPL-MCNC: 4.1 MMOL/L — SIGNIFICANT CHANGE UP (ref 3.5–5.3)
POTASSIUM SERPL-SCNC: 4.1 MMOL/L — SIGNIFICANT CHANGE UP (ref 3.5–5.3)
PROT SERPL-MCNC: 7.3 G/DL — SIGNIFICANT CHANGE UP (ref 6–8.3)
RBC # BLD: 4.74 M/UL — SIGNIFICANT CHANGE UP (ref 3.8–5.2)
RBC # FLD: 13.7 % — SIGNIFICANT CHANGE UP (ref 10.3–14.5)
SODIUM SERPL-SCNC: 140 MMOL/L — SIGNIFICANT CHANGE UP (ref 135–145)
WBC # BLD: 9.18 K/UL — SIGNIFICANT CHANGE UP (ref 3.8–10.5)
WBC # FLD AUTO: 9.18 K/UL — SIGNIFICANT CHANGE UP (ref 3.8–10.5)

## 2019-09-04 PROCEDURE — 80053 COMPREHEN METABOLIC PANEL: CPT

## 2019-09-04 PROCEDURE — 96374 THER/PROPH/DIAG INJ IV PUSH: CPT

## 2019-09-04 PROCEDURE — 84702 CHORIONIC GONADOTROPIN TEST: CPT

## 2019-09-04 PROCEDURE — 96375 TX/PRO/DX INJ NEW DRUG ADDON: CPT

## 2019-09-04 PROCEDURE — 85027 COMPLETE CBC AUTOMATED: CPT

## 2019-09-04 PROCEDURE — 99285 EMERGENCY DEPT VISIT HI MDM: CPT | Mod: 25

## 2019-09-04 PROCEDURE — 99285 EMERGENCY DEPT VISIT HI MDM: CPT

## 2019-09-04 PROCEDURE — 36415 COLL VENOUS BLD VENIPUNCTURE: CPT

## 2019-09-04 RX ORDER — KETOROLAC TROMETHAMINE 30 MG/ML
15 SYRINGE (ML) INJECTION ONCE
Refills: 0 | Status: DISCONTINUED | OUTPATIENT
Start: 2019-09-04 | End: 2019-09-04

## 2019-09-04 RX ORDER — DIPHENHYDRAMINE HCL 50 MG
25 CAPSULE ORAL ONCE
Refills: 0 | Status: COMPLETED | OUTPATIENT
Start: 2019-09-04 | End: 2019-09-04

## 2019-09-04 RX ORDER — SODIUM CHLORIDE 9 MG/ML
1000 INJECTION INTRAMUSCULAR; INTRAVENOUS; SUBCUTANEOUS ONCE
Refills: 0 | Status: COMPLETED | OUTPATIENT
Start: 2019-09-04 | End: 2019-09-04

## 2019-09-04 RX ORDER — METOCLOPRAMIDE HCL 10 MG
10 TABLET ORAL ONCE
Refills: 0 | Status: COMPLETED | OUTPATIENT
Start: 2019-09-04 | End: 2019-09-04

## 2019-09-04 RX ADMIN — Medication 10 MILLIGRAM(S): at 22:30

## 2019-09-04 RX ADMIN — Medication 15 MILLIGRAM(S): at 22:30

## 2019-09-04 RX ADMIN — SODIUM CHLORIDE 1000 MILLILITER(S): 9 INJECTION INTRAMUSCULAR; INTRAVENOUS; SUBCUTANEOUS at 22:30

## 2019-09-04 RX ADMIN — Medication 25 MILLIGRAM(S): at 22:30

## 2019-09-04 RX ADMIN — Medication 1 MILLIGRAM(S): at 23:24

## 2019-09-04 NOTE — ED PROVIDER NOTE - PHYSICAL EXAMINATION
GENERAL: wells appearing, moderate painful distress   HEAD: atraumatic   EYES: EOMI, pink conjunctiva   ENT: moist oral mucosa   CARDIAC: RRR, no edema, distal pulses present   RESPIRATORY: lungs CTAB, no increased work of breathing   GASTROINTESTINAL: no abdominal tenderness, no rebound or guarding, bowel sounds presents  GENITOURINARY: no CVA tenderness   MUSCULOSKELETAL: no deformity   NEUROLOGICAL: AAOx3, CN's II-XII intact, strength 5/5 bilateral UE and LE, sensation intact to light touch, finger to nose intact, steady gait  SKIN: intact   PSYCHIATRIC: cooperative  HEME LYMPH: no lymphadenopathy

## 2019-09-04 NOTE — ED PROVIDER NOTE - PATIENT PORTAL LINK FT
You can access the FollowMyHealth Patient Portal offered by [facility] by registering at the following website: [email or web address]. By joining [employer]’s FollowMyHealth portal, you will also be able to view your health information using other applications (apps) compatible with our system.

## 2019-09-04 NOTE — ED PROVIDER NOTE - PROGRESS NOTE DETAILS
labs - no acute findings   On reassessment, pt asymptomatic but feels sleepy. Pt drove here so will dc and when she is awake and alert she can leave ED. Discussed indications for patient return to ED. Patient understood.

## 2019-09-04 NOTE — ED ADULT TRIAGE NOTE - CHIEF COMPLAINT QUOTE
c/o bad headache today , the medicine that neurologist gave her is not working [tizanidine hcl 2 mg. and [id number]. ] as per patient

## 2019-09-04 NOTE — ED PROVIDER NOTE - OBJECTIVE STATEMENT
48 yo F pmh of GERD and migraines presents with HA x 1 day, throbbing, moderate intensity, feels similar to previous migraines. Started taking lamotrigine and tizanidine 1 day ago prescribed by private neurologist. Denies other acute complaints.  via language line.

## 2019-09-05 NOTE — ED ADULT NURSE NOTE - CHIEF COMPLAINT QUOTE
c/o bad headache today , the medicine that neurologist gave her is not working [tizanidine hcl 2 mg. and [id number]. ] as per patient

## 2019-09-05 NOTE — ED ADULT NURSE NOTE - NSIMPLEMENTINTERV_GEN_ALL_ED
Implemented All Universal Safety Interventions:  [place] to call system. Call bell, personal items and telephone within reach. Instruct patient to call for assistance. Room bathroom lighting operational. Non-slip footwear when patient is off stretcher. Physically safe environment: no spills, clutter or unnecessary equipment. Stretcher in lowest position, wheels locked, appropriate side rails in place.

## 2019-09-20 ENCOUNTER — APPOINTMENT (OUTPATIENT)
Dept: ENDOCRINOLOGY | Facility: CLINIC | Age: 49
End: 2019-09-20

## 2020-02-13 ENCOUNTER — APPOINTMENT (OUTPATIENT)
Dept: ULTRASOUND IMAGING | Facility: HOSPITAL | Age: 50
End: 2020-02-13
Payer: MEDICAID

## 2020-02-20 ENCOUNTER — FORM ENCOUNTER (OUTPATIENT)
Age: 50
End: 2020-02-20

## 2020-02-21 ENCOUNTER — OUTPATIENT (OUTPATIENT)
Dept: OUTPATIENT SERVICES | Facility: HOSPITAL | Age: 50
LOS: 1 days | End: 2020-02-21
Payer: MEDICAID

## 2020-02-21 ENCOUNTER — APPOINTMENT (OUTPATIENT)
Dept: ULTRASOUND IMAGING | Facility: HOSPITAL | Age: 50
End: 2020-02-21

## 2020-02-21 DIAGNOSIS — Z98.89 OTHER SPECIFIED POSTPROCEDURAL STATES: Chronic | ICD-10-CM

## 2020-02-21 DIAGNOSIS — E04.1 NONTOXIC SINGLE THYROID NODULE: ICD-10-CM

## 2020-02-21 PROCEDURE — 76536 US EXAM OF HEAD AND NECK: CPT | Mod: 26

## 2020-02-21 PROCEDURE — 76536 US EXAM OF HEAD AND NECK: CPT

## 2020-03-21 ENCOUNTER — EMERGENCY (EMERGENCY)
Facility: HOSPITAL | Age: 50
LOS: 1 days | Discharge: HOME | End: 2020-03-21
Attending: STUDENT IN AN ORGANIZED HEALTH CARE EDUCATION/TRAINING PROGRAM
Payer: MEDICAID

## 2020-03-21 VITALS
HEART RATE: 85 BPM | HEIGHT: 60 IN | DIASTOLIC BLOOD PRESSURE: 89 MMHG | RESPIRATION RATE: 16 BRPM | OXYGEN SATURATION: 99 % | SYSTOLIC BLOOD PRESSURE: 133 MMHG | WEIGHT: 149.91 LBS | TEMPERATURE: 99 F

## 2020-03-21 DIAGNOSIS — Z98.89 OTHER SPECIFIED POSTPROCEDURAL STATES: Chronic | ICD-10-CM

## 2020-03-21 PROCEDURE — U0001: CPT

## 2020-03-21 PROCEDURE — 99283 EMERGENCY DEPT VISIT LOW MDM: CPT

## 2020-03-21 NOTE — ED PROVIDER NOTE - PATIENT PORTAL LINK FT
You can access the FollowMyHealth Patient Portal offered by [facility] by registering at the following website: [email or web address]. By joining [email or web address]’s FollowMyHealth portal, you will also be able to view your health information using other applications (apps) compatible with our system.

## 2020-03-21 NOTE — ED PROVIDER NOTE - CLINICAL SUMMARY MEDICAL DECISION MAKING FREE TEXT BOX
48 y/o F patient presents for symptoms of coronavirus.  Patient not tachypneic or hypoxic.  Will test, return precautions provided, will advise to self isolate.

## 2020-03-21 NOTE — ED PROVIDER NOTE - OBJECTIVE STATEMENT
48 y/o F patient, w/ PMHx of HTN and Migraine, presents to ED for testing for corona virus.  Patient presents with flu like symptoms for a few days, requesting testing done. NKDA.

## 2020-03-21 NOTE — ED ADULT NURSE NOTE - CAS ELECT INFOMATION PROVIDED
pt discharge home shilo complete dc instruction given to pt pt  able to verbalize dc instruction well/DC instructions

## 2020-03-22 LAB — SARS-COV-2 RNA SPEC QL NAA+PROBE: DETECTED

## 2020-09-11 ENCOUNTER — EMERGENCY (EMERGENCY)
Facility: HOSPITAL | Age: 50
LOS: 1 days | Discharge: HOME | End: 2020-09-11
Attending: STUDENT IN AN ORGANIZED HEALTH CARE EDUCATION/TRAINING PROGRAM
Payer: MEDICAID

## 2020-09-11 VITALS — HEIGHT: 59 IN | WEIGHT: 179.9 LBS

## 2020-09-11 VITALS
DIASTOLIC BLOOD PRESSURE: 95 MMHG | RESPIRATION RATE: 20 BRPM | OXYGEN SATURATION: 98 % | TEMPERATURE: 98 F | HEART RATE: 72 BPM | SYSTOLIC BLOOD PRESSURE: 147 MMHG

## 2020-09-11 DIAGNOSIS — Z98.89 OTHER SPECIFIED POSTPROCEDURAL STATES: Chronic | ICD-10-CM

## 2020-09-11 LAB
ALBUMIN SERPL ELPH-MCNC: 3.7 G/DL — SIGNIFICANT CHANGE UP (ref 3.5–5)
ALP SERPL-CCNC: 82 U/L — SIGNIFICANT CHANGE UP (ref 40–120)
ALT FLD-CCNC: 54 U/L DA — SIGNIFICANT CHANGE UP (ref 10–60)
ANION GAP SERPL CALC-SCNC: 4 MMOL/L — LOW (ref 5–17)
AST SERPL-CCNC: 32 U/L — SIGNIFICANT CHANGE UP (ref 10–40)
BASOPHILS # BLD AUTO: 0.06 K/UL — SIGNIFICANT CHANGE UP (ref 0–0.2)
BASOPHILS NFR BLD AUTO: 0.6 % — SIGNIFICANT CHANGE UP (ref 0–2)
BILIRUB SERPL-MCNC: 0.3 MG/DL — SIGNIFICANT CHANGE UP (ref 0.2–1.2)
BUN SERPL-MCNC: 13 MG/DL — SIGNIFICANT CHANGE UP (ref 7–18)
CALCIUM SERPL-MCNC: 9.2 MG/DL — SIGNIFICANT CHANGE UP (ref 8.4–10.5)
CHLORIDE SERPL-SCNC: 101 MMOL/L — SIGNIFICANT CHANGE UP (ref 96–108)
CO2 SERPL-SCNC: 31 MMOL/L — SIGNIFICANT CHANGE UP (ref 22–31)
CREAT SERPL-MCNC: 0.7 MG/DL — SIGNIFICANT CHANGE UP (ref 0.5–1.3)
EOSINOPHIL # BLD AUTO: 0.47 K/UL — SIGNIFICANT CHANGE UP (ref 0–0.5)
EOSINOPHIL NFR BLD AUTO: 5 % — SIGNIFICANT CHANGE UP (ref 0–6)
GLUCOSE SERPL-MCNC: 107 MG/DL — HIGH (ref 70–99)
HCT VFR BLD CALC: 38.5 % — SIGNIFICANT CHANGE UP (ref 34.5–45)
HGB BLD-MCNC: 13.3 G/DL — SIGNIFICANT CHANGE UP (ref 11.5–15.5)
IMM GRANULOCYTES NFR BLD AUTO: 0.2 % — SIGNIFICANT CHANGE UP (ref 0–1.5)
LYMPHOCYTES # BLD AUTO: 2.97 K/UL — SIGNIFICANT CHANGE UP (ref 1–3.3)
LYMPHOCYTES # BLD AUTO: 31.6 % — SIGNIFICANT CHANGE UP (ref 13–44)
MAGNESIUM SERPL-MCNC: 2.5 MG/DL — SIGNIFICANT CHANGE UP (ref 1.6–2.6)
MCHC RBC-ENTMCNC: 28 PG — SIGNIFICANT CHANGE UP (ref 27–34)
MCHC RBC-ENTMCNC: 34.5 GM/DL — SIGNIFICANT CHANGE UP (ref 32–36)
MCV RBC AUTO: 81.1 FL — SIGNIFICANT CHANGE UP (ref 80–100)
MONOCYTES # BLD AUTO: 0.63 K/UL — SIGNIFICANT CHANGE UP (ref 0–0.9)
MONOCYTES NFR BLD AUTO: 6.7 % — SIGNIFICANT CHANGE UP (ref 2–14)
NEUTROPHILS # BLD AUTO: 5.26 K/UL — SIGNIFICANT CHANGE UP (ref 1.8–7.4)
NEUTROPHILS NFR BLD AUTO: 55.9 % — SIGNIFICANT CHANGE UP (ref 43–77)
NRBC # BLD: 0 /100 WBCS — SIGNIFICANT CHANGE UP (ref 0–0)
PLATELET # BLD AUTO: 309 K/UL — SIGNIFICANT CHANGE UP (ref 150–400)
POTASSIUM SERPL-MCNC: 4.2 MMOL/L — SIGNIFICANT CHANGE UP (ref 3.5–5.3)
POTASSIUM SERPL-SCNC: 4.2 MMOL/L — SIGNIFICANT CHANGE UP (ref 3.5–5.3)
PROT SERPL-MCNC: 7.9 G/DL — SIGNIFICANT CHANGE UP (ref 6–8.3)
RBC # BLD: 4.75 M/UL — SIGNIFICANT CHANGE UP (ref 3.8–5.2)
RBC # FLD: 13.4 % — SIGNIFICANT CHANGE UP (ref 10.3–14.5)
SODIUM SERPL-SCNC: 136 MMOL/L — SIGNIFICANT CHANGE UP (ref 135–145)
WBC # BLD: 9.41 K/UL — SIGNIFICANT CHANGE UP (ref 3.8–10.5)
WBC # FLD AUTO: 9.41 K/UL — SIGNIFICANT CHANGE UP (ref 3.8–10.5)

## 2020-09-11 PROCEDURE — 80053 COMPREHEN METABOLIC PANEL: CPT

## 2020-09-11 PROCEDURE — 36415 COLL VENOUS BLD VENIPUNCTURE: CPT

## 2020-09-11 PROCEDURE — 96374 THER/PROPH/DIAG INJ IV PUSH: CPT

## 2020-09-11 PROCEDURE — 99285 EMERGENCY DEPT VISIT HI MDM: CPT | Mod: 25

## 2020-09-11 PROCEDURE — 83735 ASSAY OF MAGNESIUM: CPT

## 2020-09-11 PROCEDURE — 85025 COMPLETE CBC W/AUTO DIFF WBC: CPT

## 2020-09-11 PROCEDURE — 99284 EMERGENCY DEPT VISIT MOD MDM: CPT | Mod: 25

## 2020-09-11 PROCEDURE — 96375 TX/PRO/DX INJ NEW DRUG ADDON: CPT

## 2020-09-11 RX ORDER — KETOROLAC TROMETHAMINE 30 MG/ML
15 SYRINGE (ML) INJECTION ONCE
Refills: 0 | Status: DISCONTINUED | OUTPATIENT
Start: 2020-09-11 | End: 2020-09-11

## 2020-09-11 RX ORDER — SODIUM CHLORIDE 9 MG/ML
1000 INJECTION INTRAMUSCULAR; INTRAVENOUS; SUBCUTANEOUS ONCE
Refills: 0 | Status: COMPLETED | OUTPATIENT
Start: 2020-09-11 | End: 2020-09-11

## 2020-09-11 RX ORDER — METOCLOPRAMIDE HCL 10 MG
10 TABLET ORAL ONCE
Refills: 0 | Status: COMPLETED | OUTPATIENT
Start: 2020-09-11 | End: 2020-09-11

## 2020-09-11 RX ORDER — ACETAMINOPHEN 500 MG
975 TABLET ORAL ONCE
Refills: 0 | Status: COMPLETED | OUTPATIENT
Start: 2020-09-11 | End: 2020-09-11

## 2020-09-11 RX ADMIN — Medication 15 MILLIGRAM(S): at 20:06

## 2020-09-11 RX ADMIN — Medication 10 MILLIGRAM(S): at 20:05

## 2020-09-11 RX ADMIN — SODIUM CHLORIDE 1000 MILLILITER(S): 9 INJECTION INTRAMUSCULAR; INTRAVENOUS; SUBCUTANEOUS at 20:05

## 2020-09-11 RX ADMIN — Medication 975 MILLIGRAM(S): at 20:58

## 2020-09-11 RX ADMIN — Medication 15 MILLIGRAM(S): at 20:58

## 2020-09-11 NOTE — ED PROVIDER NOTE - CLINICAL SUMMARY MEDICAL DECISION MAKING FREE TEXT BOX
50F presenting with headache. no other neurological symptoms and non-focal neuro exam. pain typical of usual migraine and is following with neurologist. low concern for intracranial pathology (mass, bleed). labs and pain control.

## 2020-09-11 NOTE — ED PROVIDER NOTE - PHYSICAL EXAMINATION
General: uncomfortable appearing female, no acute distress   HEENT: normocephalic, atraumatic, PERRL, EOMI  Respiratory: normal work of breathing, lungs clear to auscultation bilaterally   Cardiac: regular rate and rhythm   Abdomen: soft, non-tender, no guarding or rebound   MSK: no swelling or tenderness of lower extremities, moving all extremities spontaneously   Skin: warm, dry   Neuro: A&Ox3, cranial nerves II-XII intact, no pronator drift, 5/5 strength in all extremities, no sensory deficits, normal gait   Psych: appropriate affect

## 2020-09-11 NOTE — ED PROVIDER NOTE - PATIENT PORTAL LINK FT
You can access the FollowMyHealth Patient Portal offered by [facility] by registering at the following website: [email or web address]. By joining [employer]’s FollowMyHealth portal, you will also be able to view your health information using other applications (apps) compatible with our system.

## 2020-09-11 NOTE — ED ADULT NURSE NOTE - NS ED NURSE DISCH DISPOSITION
HISTORY OF PRESENT ILLNESS  Cleopatra Cr is a 60 y.o. female. Patient presents with:  Hypertension: followup     Cleopatra Cr is here to follow up on their HTN. This is a chronic problem. The problem occurs constantly and is stable. The symptoms are relieved by Norvasc, which is/are working well. She is having swelling in both legs. Also, I last saw her for right plantar metatarsalgia. This has resolved, but now she has Achilles pain when walking. The pain has been present for 3 days and is getting worse. She is still stretching and this feels OK. The patient had an ORIF done on that ankle a little over a year ago    She is due for hepatitis C screening. Review of Systems   Constitutional: Negative for weight loss. No weight gain   Eyes: Negative for blurred vision. Respiratory: Negative for shortness of breath. Cardiovascular: Positive for leg swelling. Negative for chest pain. Gastrointestinal: Negative for abdominal pain. Neurological: Negative for dizziness, sensory change, speech change, focal weakness and headaches. Visit Vitals  /68   Pulse 81   Temp 98 °F (36.7 °C) (Oral)   Resp 18   Ht 5' 4\" (1.626 m)   Wt 142 lb (64.4 kg)   BMI 24.37 kg/m²     BP Readings from Last 3 Encounters:   10/31/19 116/68   09/16/19 116/72   09/04/19 110/65     Physical Exam   Constitutional: She is oriented to person, place, and time. She appears well-developed and well-nourished. No distress. Cardiovascular: Normal rate, regular rhythm and normal heart sounds. Exam reveals no gallop and no friction rub. No murmur heard. Pulses:       Dorsalis pedis pulses are 2+ on the right side. Posterior tibial pulses are 2+ on the right side. Pulmonary/Chest: Effort normal and breath sounds normal. No respiratory distress. She has no wheezes. She has no rales. Musculoskeletal: She exhibits no edema. Right ankle: She exhibits normal range of motion and no swelling. No tenderness. Achilles tendon normal.        Right foot: There is no tenderness. No Achilles tendon tenderness   Neurological: She is alert and oriented to person, place, and time. No sensory deficit. Skin: Skin is warm and dry. She is not diaphoretic. Nursing note and vitals reviewed. ASSESSMENT and PLAN    ICD-10-CM ICD-9-CM    1. Essential hypertension Q98 696.6 METABOLIC PANEL, BASIC      valsartan (DIOVAN) 80 mg tablet   2. Bilateral leg edema R60.0 782.3    3. Achilles tendon pain M76.60 727.89 naproxen (NAPROSYN) 500 mg tablet   4. Need for hepatitis C screening test Z11.59 V73.89 HCV AB W/RFLX TO NIRU        Blood pressure controlled, unfortunately most likely due to the Norvasc  Achilles pain possibly due to shoe pressure vs early Achilles tendonitis  Labs per orders. Stop Norvasc  Start Diovan  Ice, Naprosyn, rest for heel  Change shoes    Follow-up and Dispositions    · Return in about 6 weeks (around 12/12/2019) for blood pressure. Reviewed plan of care. Patient has provided input and agrees with goals. Discharged

## 2020-09-11 NOTE — ED PROVIDER NOTE - NSFOLLOWUPINSTRUCTIONS_ED_ALL_ED_FT
You were seen in the emergency department for headache.     Please follow-up with your neurologist in the next 24-48 hours.     If you have any worsening symptoms, severe headache, changes in vision, nausea or vomiting, please return to the emergency department.

## 2020-09-11 NOTE — ED PROVIDER NOTE - OBJECTIVE STATEMENT
50F, pmh of migraine headache, htn, anxiety, presenting with 3 days of headache. patient reports 3 days of headache similar to her previous episodes. was seen by her neurologist who gave her two new medications as it was too early to refill her usual imitrex. has nausea but no vomiting. denies fever, trauma, changes in vision, chest pain, shortness of breath, abdominal pain.

## 2020-09-11 NOTE — ED PROVIDER NOTE - PROGRESS NOTE DETAILS
symptoms moderately improved. will give tylenol. Gary Koroma patient and son updated on results. reports symptoms improved. will discharge with neurology follow-up. Gary Koroma

## 2020-12-04 ENCOUNTER — EMERGENCY (EMERGENCY)
Facility: HOSPITAL | Age: 50
LOS: 1 days | Discharge: HOME | End: 2020-12-04
Attending: EMERGENCY MEDICINE
Payer: MEDICAID

## 2020-12-04 VITALS
TEMPERATURE: 98 F | SYSTOLIC BLOOD PRESSURE: 136 MMHG | HEIGHT: 59 IN | RESPIRATION RATE: 18 BRPM | WEIGHT: 179.9 LBS | OXYGEN SATURATION: 98 % | HEART RATE: 68 BPM | DIASTOLIC BLOOD PRESSURE: 87 MMHG

## 2020-12-04 VITALS
DIASTOLIC BLOOD PRESSURE: 70 MMHG | RESPIRATION RATE: 20 BRPM | TEMPERATURE: 98 F | SYSTOLIC BLOOD PRESSURE: 130 MMHG | HEART RATE: 70 BPM | OXYGEN SATURATION: 99 %

## 2020-12-04 DIAGNOSIS — Z98.89 OTHER SPECIFIED POSTPROCEDURAL STATES: Chronic | ICD-10-CM

## 2020-12-04 LAB
ALBUMIN SERPL ELPH-MCNC: 3.7 G/DL — SIGNIFICANT CHANGE UP (ref 3.5–5)
ALP SERPL-CCNC: 81 U/L — SIGNIFICANT CHANGE UP (ref 40–120)
ALT FLD-CCNC: 59 U/L DA — SIGNIFICANT CHANGE UP (ref 10–60)
ANION GAP SERPL CALC-SCNC: 6 MMOL/L — SIGNIFICANT CHANGE UP (ref 5–17)
APPEARANCE UR: ABNORMAL
AST SERPL-CCNC: 41 U/L — HIGH (ref 10–40)
BACTERIA # UR AUTO: ABNORMAL /HPF
BASOPHILS # BLD AUTO: 0.07 K/UL — SIGNIFICANT CHANGE UP (ref 0–0.2)
BASOPHILS NFR BLD AUTO: 1 % — SIGNIFICANT CHANGE UP (ref 0–2)
BILIRUB SERPL-MCNC: 0.2 MG/DL — SIGNIFICANT CHANGE UP (ref 0.2–1.2)
BILIRUB UR-MCNC: ABNORMAL
BUN SERPL-MCNC: 16 MG/DL — SIGNIFICANT CHANGE UP (ref 7–18)
CALCIUM SERPL-MCNC: 9.4 MG/DL — SIGNIFICANT CHANGE UP (ref 8.4–10.5)
CHLORIDE SERPL-SCNC: 104 MMOL/L — SIGNIFICANT CHANGE UP (ref 96–108)
CO2 SERPL-SCNC: 29 MMOL/L — SIGNIFICANT CHANGE UP (ref 22–31)
COLOR SPEC: YELLOW — SIGNIFICANT CHANGE UP
CREAT SERPL-MCNC: 0.68 MG/DL — SIGNIFICANT CHANGE UP (ref 0.5–1.3)
DIFF PNL FLD: ABNORMAL
EOSINOPHIL # BLD AUTO: 0.3 K/UL — SIGNIFICANT CHANGE UP (ref 0–0.5)
EOSINOPHIL NFR BLD AUTO: 4.1 % — SIGNIFICANT CHANGE UP (ref 0–6)
EPI CELLS # UR: SIGNIFICANT CHANGE UP /HPF
GLUCOSE SERPL-MCNC: 103 MG/DL — HIGH (ref 70–99)
GLUCOSE UR QL: NEGATIVE — SIGNIFICANT CHANGE UP
GRAN CASTS # UR COMP ASSIST: ABNORMAL /LPF
HCT VFR BLD CALC: 42.9 % — SIGNIFICANT CHANGE UP (ref 34.5–45)
HGB BLD-MCNC: 13.7 G/DL — SIGNIFICANT CHANGE UP (ref 11.5–15.5)
IMM GRANULOCYTES NFR BLD AUTO: 0.1 % — SIGNIFICANT CHANGE UP (ref 0–1.5)
KETONES UR-MCNC: NEGATIVE — SIGNIFICANT CHANGE UP
LEUKOCYTE ESTERASE UR-ACNC: ABNORMAL
LYMPHOCYTES # BLD AUTO: 2.71 K/UL — SIGNIFICANT CHANGE UP (ref 1–3.3)
LYMPHOCYTES # BLD AUTO: 37.1 % — SIGNIFICANT CHANGE UP (ref 13–44)
MCHC RBC-ENTMCNC: 26.1 PG — LOW (ref 27–34)
MCHC RBC-ENTMCNC: 31.9 GM/DL — LOW (ref 32–36)
MCV RBC AUTO: 81.9 FL — SIGNIFICANT CHANGE UP (ref 80–100)
MONOCYTES # BLD AUTO: 0.63 K/UL — SIGNIFICANT CHANGE UP (ref 0–0.9)
MONOCYTES NFR BLD AUTO: 8.6 % — SIGNIFICANT CHANGE UP (ref 2–14)
NEUTROPHILS # BLD AUTO: 3.59 K/UL — SIGNIFICANT CHANGE UP (ref 1.8–7.4)
NEUTROPHILS NFR BLD AUTO: 49.1 % — SIGNIFICANT CHANGE UP (ref 43–77)
NITRITE UR-MCNC: NEGATIVE — SIGNIFICANT CHANGE UP
NRBC # BLD: 0 /100 WBCS — SIGNIFICANT CHANGE UP (ref 0–0)
PH UR: 6.5 — SIGNIFICANT CHANGE UP (ref 5–8)
PLATELET # BLD AUTO: 296 K/UL — SIGNIFICANT CHANGE UP (ref 150–400)
POTASSIUM SERPL-MCNC: 3.9 MMOL/L — SIGNIFICANT CHANGE UP (ref 3.5–5.3)
POTASSIUM SERPL-SCNC: 3.9 MMOL/L — SIGNIFICANT CHANGE UP (ref 3.5–5.3)
PROT SERPL-MCNC: 7.7 G/DL — SIGNIFICANT CHANGE UP (ref 6–8.3)
PROT UR-MCNC: 100
RBC # BLD: 5.24 M/UL — HIGH (ref 3.8–5.2)
RBC # FLD: 13.5 % — SIGNIFICANT CHANGE UP (ref 10.3–14.5)
RBC CASTS # UR COMP ASSIST: ABNORMAL /HPF (ref 0–2)
SARS-COV-2 RNA SPEC QL NAA+PROBE: SIGNIFICANT CHANGE UP
SODIUM SERPL-SCNC: 139 MMOL/L — SIGNIFICANT CHANGE UP (ref 135–145)
SP GR SPEC: 1.02 — SIGNIFICANT CHANGE UP (ref 1.01–1.02)
TROPONIN I SERPL-MCNC: <0.015 NG/ML — SIGNIFICANT CHANGE UP (ref 0–0.04)
TSH SERPL-MCNC: 1.02 UU/ML — SIGNIFICANT CHANGE UP (ref 0.34–4.82)
UROBILINOGEN FLD QL: 1
WBC # BLD: 7.31 K/UL — SIGNIFICANT CHANGE UP (ref 3.8–10.5)
WBC # FLD AUTO: 7.31 K/UL — SIGNIFICANT CHANGE UP (ref 3.8–10.5)
WBC UR QL: ABNORMAL /HPF (ref 0–5)

## 2020-12-04 PROCEDURE — 71046 X-RAY EXAM CHEST 2 VIEWS: CPT | Mod: 26

## 2020-12-04 PROCEDURE — 93005 ELECTROCARDIOGRAM TRACING: CPT

## 2020-12-04 PROCEDURE — 36415 COLL VENOUS BLD VENIPUNCTURE: CPT

## 2020-12-04 PROCEDURE — 81001 URINALYSIS AUTO W/SCOPE: CPT

## 2020-12-04 PROCEDURE — 84443 ASSAY THYROID STIM HORMONE: CPT

## 2020-12-04 PROCEDURE — 96374 THER/PROPH/DIAG INJ IV PUSH: CPT

## 2020-12-04 PROCEDURE — 71046 X-RAY EXAM CHEST 2 VIEWS: CPT

## 2020-12-04 PROCEDURE — 87635 SARS-COV-2 COVID-19 AMP PRB: CPT

## 2020-12-04 PROCEDURE — 99284 EMERGENCY DEPT VISIT MOD MDM: CPT | Mod: 25

## 2020-12-04 PROCEDURE — 99285 EMERGENCY DEPT VISIT HI MDM: CPT

## 2020-12-04 PROCEDURE — 87086 URINE CULTURE/COLONY COUNT: CPT

## 2020-12-04 PROCEDURE — 82962 GLUCOSE BLOOD TEST: CPT

## 2020-12-04 PROCEDURE — 85025 COMPLETE CBC W/AUTO DIFF WBC: CPT

## 2020-12-04 PROCEDURE — 84484 ASSAY OF TROPONIN QUANT: CPT

## 2020-12-04 PROCEDURE — 80053 COMPREHEN METABOLIC PANEL: CPT

## 2020-12-04 RX ORDER — CEPHALEXIN 500 MG
1 CAPSULE ORAL
Qty: 10 | Refills: 0
Start: 2020-12-04 | End: 2020-12-08

## 2020-12-04 RX ORDER — IBUPROFEN 200 MG
1 TABLET ORAL
Qty: 56 | Refills: 0
Start: 2020-12-04 | End: 2020-12-17

## 2020-12-04 RX ORDER — CEFTRIAXONE 500 MG/1
1000 INJECTION, POWDER, FOR SOLUTION INTRAMUSCULAR; INTRAVENOUS ONCE
Refills: 0 | Status: COMPLETED | OUTPATIENT
Start: 2020-12-04 | End: 2020-12-04

## 2020-12-04 RX ADMIN — CEFTRIAXONE 100 MILLIGRAM(S): 500 INJECTION, POWDER, FOR SOLUTION INTRAMUSCULAR; INTRAVENOUS at 17:45

## 2020-12-04 NOTE — ED ADULT NURSE NOTE - NSIMPLEMENTINTERV_GEN_ALL_ED
Implemented All Universal Safety Interventions:  [place] to call system. Call bell, personal items and telephone within reach. Instruct patient to call for assistance. Room bathroom lighting operational. Non-slip footwear when patient is off stretcher. Physically safe environment: no spills, clutter or unnecessary equipment. Stretcher in lowest position, wheels locked, appropriate side rails in place.

## 2020-12-04 NOTE — ED PROVIDER NOTE - PROGRESS NOTE DETAILS
foster: work up shows uti.   dx uti. rx keflex and motrin. f/u with pcp. left ambulatory.  return precautions given.

## 2020-12-04 NOTE — ED PROVIDER NOTE - OBJECTIVE STATEMENT
49 yo female with PMHx of HTN, migraine, anxiety, covid positive in 4/2020 with antibodies, presents with generalized fatigue, decreased PO, generalized sweats, and abdominal discomfort. Patient denies any pain but states that she does feel uncomfortable after eating. She has had normal bowel movements She denies any medicine changes, weight loss, pain, significant swelling, cough, dysuria, fever, sick contacts, chest pain, shortness of breath, rash, drug use, or alcohol use. Patient is concerned that she could be having repeat infection and covid.

## 2020-12-04 NOTE — ED PROVIDER NOTE - PATIENT PORTAL LINK FT
You can access the FollowMyHealth Patient Portal offered by [facility] by registering at the following website: [email or web address]. By joining [email or web address]’s FollowMyHealth portal, you will also be able to view your health information using other applications (apps) compatible with our system.

## 2020-12-04 NOTE — ED ADULT NURSE NOTE - NS PRO PASSIVE SMOKE EXP
In the patient with baseline ambulatory dysfunction, however, per nursing home patient is usually able to transfer  She currently is drowsy and having difficulty with sitting up in bed  PT/OT consultation pending  MRI pending  No

## 2020-12-04 NOTE — ED PROVIDER NOTE - CLINICAL SUMMARY MEDICAL DECISION MAKING FREE TEXT BOX
Patient with constitutional symptoms could be due to mild viral syndrome although no respiratory symptoms vs thyroid vs anxiety vs atypical ?ACS. If workup is negative, patient can be discharged home and monitor symptoms and follow up PCP. Will obtain Labs, CXR, EKG, repeat covid swab, thyroid, and will reassess.

## 2020-12-05 LAB
CULTURE RESULTS: SIGNIFICANT CHANGE UP
SPECIMEN SOURCE: SIGNIFICANT CHANGE UP

## 2021-02-26 NOTE — ED PROVIDER NOTE - CARDIAC, MLM
Pending Prescriptions:                       Disp   Refills    pregabalin (LYRICA) 50 MG capsule [Pharmac*90 cap*1        Sig: TAKE ONE CAPSULE BY MOUTH THREE TIMES A DAY    Routing refill request to provider for review/approval because:  Drug not on the [facility] refill protocol          Normal rate, regular rhythm.  Heart sounds S1, S2.  No murmurs, rubs or gallops.

## 2021-05-21 ENCOUNTER — EMERGENCY (EMERGENCY)
Facility: HOSPITAL | Age: 51
LOS: 1 days | Discharge: HOME | End: 2021-05-21
Attending: EMERGENCY MEDICINE
Payer: MEDICAID

## 2021-05-21 VITALS
OXYGEN SATURATION: 97 % | TEMPERATURE: 98 F | HEIGHT: 59 IN | SYSTOLIC BLOOD PRESSURE: 148 MMHG | HEART RATE: 86 BPM | DIASTOLIC BLOOD PRESSURE: 101 MMHG | RESPIRATION RATE: 18 BRPM

## 2021-05-21 DIAGNOSIS — Z98.89 OTHER SPECIFIED POSTPROCEDURAL STATES: Chronic | ICD-10-CM

## 2021-05-21 LAB
ALBUMIN SERPL ELPH-MCNC: 3.5 G/DL — SIGNIFICANT CHANGE UP (ref 3.5–5)
ALP SERPL-CCNC: 99 U/L — SIGNIFICANT CHANGE UP (ref 40–120)
ALT FLD-CCNC: 60 U/L DA — SIGNIFICANT CHANGE UP (ref 10–60)
ANION GAP SERPL CALC-SCNC: 8 MMOL/L — SIGNIFICANT CHANGE UP (ref 5–17)
AST SERPL-CCNC: 27 U/L — SIGNIFICANT CHANGE UP (ref 10–40)
BASOPHILS # BLD AUTO: 0.04 K/UL — SIGNIFICANT CHANGE UP (ref 0–0.2)
BASOPHILS NFR BLD AUTO: 0.5 % — SIGNIFICANT CHANGE UP (ref 0–2)
BILIRUB SERPL-MCNC: 0.3 MG/DL — SIGNIFICANT CHANGE UP (ref 0.2–1.2)
BUN SERPL-MCNC: 17 MG/DL — SIGNIFICANT CHANGE UP (ref 7–18)
CALCIUM SERPL-MCNC: 8.9 MG/DL — SIGNIFICANT CHANGE UP (ref 8.4–10.5)
CHLORIDE SERPL-SCNC: 105 MMOL/L — SIGNIFICANT CHANGE UP (ref 96–108)
CO2 SERPL-SCNC: 28 MMOL/L — SIGNIFICANT CHANGE UP (ref 22–31)
CREAT SERPL-MCNC: 0.96 MG/DL — SIGNIFICANT CHANGE UP (ref 0.5–1.3)
EOSINOPHIL # BLD AUTO: 0.28 K/UL — SIGNIFICANT CHANGE UP (ref 0–0.5)
EOSINOPHIL NFR BLD AUTO: 3.7 % — SIGNIFICANT CHANGE UP (ref 0–6)
GLUCOSE SERPL-MCNC: 103 MG/DL — HIGH (ref 70–99)
HCT VFR BLD CALC: 37.7 % — SIGNIFICANT CHANGE UP (ref 34.5–45)
HGB BLD-MCNC: 12.3 G/DL — SIGNIFICANT CHANGE UP (ref 11.5–15.5)
IMM GRANULOCYTES NFR BLD AUTO: 0.1 % — SIGNIFICANT CHANGE UP (ref 0–1.5)
LYMPHOCYTES # BLD AUTO: 3.13 K/UL — SIGNIFICANT CHANGE UP (ref 1–3.3)
LYMPHOCYTES # BLD AUTO: 41.9 % — SIGNIFICANT CHANGE UP (ref 13–44)
MAGNESIUM SERPL-MCNC: 2.2 MG/DL — SIGNIFICANT CHANGE UP (ref 1.6–2.6)
MCHC RBC-ENTMCNC: 26.7 PG — LOW (ref 27–34)
MCHC RBC-ENTMCNC: 32.6 GM/DL — SIGNIFICANT CHANGE UP (ref 32–36)
MCV RBC AUTO: 82 FL — SIGNIFICANT CHANGE UP (ref 80–100)
MONOCYTES # BLD AUTO: 0.69 K/UL — SIGNIFICANT CHANGE UP (ref 0–0.9)
MONOCYTES NFR BLD AUTO: 9.2 % — SIGNIFICANT CHANGE UP (ref 2–14)
NEUTROPHILS # BLD AUTO: 3.32 K/UL — SIGNIFICANT CHANGE UP (ref 1.8–7.4)
NEUTROPHILS NFR BLD AUTO: 44.6 % — SIGNIFICANT CHANGE UP (ref 43–77)
NRBC # BLD: 0 /100 WBCS — SIGNIFICANT CHANGE UP (ref 0–0)
PLATELET # BLD AUTO: 366 K/UL — SIGNIFICANT CHANGE UP (ref 150–400)
POTASSIUM SERPL-MCNC: 3.8 MMOL/L — SIGNIFICANT CHANGE UP (ref 3.5–5.3)
POTASSIUM SERPL-SCNC: 3.8 MMOL/L — SIGNIFICANT CHANGE UP (ref 3.5–5.3)
PROT SERPL-MCNC: 7.6 G/DL — SIGNIFICANT CHANGE UP (ref 6–8.3)
RBC # BLD: 4.6 M/UL — SIGNIFICANT CHANGE UP (ref 3.8–5.2)
RBC # FLD: 14.1 % — SIGNIFICANT CHANGE UP (ref 10.3–14.5)
SODIUM SERPL-SCNC: 141 MMOL/L — SIGNIFICANT CHANGE UP (ref 135–145)
WBC # BLD: 7.47 K/UL — SIGNIFICANT CHANGE UP (ref 3.8–10.5)
WBC # FLD AUTO: 7.47 K/UL — SIGNIFICANT CHANGE UP (ref 3.8–10.5)

## 2021-05-21 PROCEDURE — 70450 CT HEAD/BRAIN W/O DYE: CPT | Mod: 26,MG

## 2021-05-21 PROCEDURE — G1004: CPT

## 2021-05-21 PROCEDURE — 99285 EMERGENCY DEPT VISIT HI MDM: CPT

## 2021-05-21 RX ORDER — KETOROLAC TROMETHAMINE 30 MG/ML
30 SYRINGE (ML) INJECTION ONCE
Refills: 0 | Status: DISCONTINUED | OUTPATIENT
Start: 2021-05-21 | End: 2021-05-21

## 2021-05-21 RX ORDER — ONDANSETRON 8 MG/1
4 TABLET, FILM COATED ORAL ONCE
Refills: 0 | Status: COMPLETED | OUTPATIENT
Start: 2021-05-21 | End: 2021-05-21

## 2021-05-21 RX ORDER — SODIUM CHLORIDE 9 MG/ML
500 INJECTION INTRAMUSCULAR; INTRAVENOUS; SUBCUTANEOUS ONCE
Refills: 0 | Status: COMPLETED | OUTPATIENT
Start: 2021-05-21 | End: 2021-05-21

## 2021-05-21 RX ORDER — ACETAMINOPHEN 500 MG
650 TABLET ORAL ONCE
Refills: 0 | Status: COMPLETED | OUTPATIENT
Start: 2021-05-21 | End: 2021-05-21

## 2021-05-21 RX ADMIN — Medication 30 MILLIGRAM(S): at 23:58

## 2021-05-21 RX ADMIN — Medication 650 MILLIGRAM(S): at 23:57

## 2021-05-21 RX ADMIN — ONDANSETRON 4 MILLIGRAM(S): 8 TABLET, FILM COATED ORAL at 23:56

## 2021-05-21 NOTE — ED PROVIDER NOTE - CLINICAL SUMMARY MEDICAL DECISION MAKING FREE TEXT BOX
Pt with persistent migraine headache not responsive to usual intervention. Will obtain labs and head CT. Will give migraine cocktail and reassess. Pt with persistent migraine headache not responsive to usual intervention. Will obtain labs and head CT. Will give migraine cocktail and reassess.    Steven 0248:  S/o from Dr Bruce pending meds and CT. CT scan showing no acute findings (spoke with rad as official reads not crossing over). On reassessment pt states that her HA has resolved with meds and fluids and she feels well. Rec neuro f/u for migraines. Most likely headache of non emergent etiology - the details of the case, history, and exam make more emergent diagnoses much less likely. Discussed with pt my clinical impression and results, patient given strict return precautions if persistent or worsening of symptoms occurs, and need for close follow up. Pt expressed understanding and agrees with plan. Pt is well appearing with a reassuring exam. Discharge home with PMD or Specialist f/u within 5 days.

## 2021-05-21 NOTE — ED PROVIDER NOTE - NSFOLLOWUPCLINICS_GEN_ALL_ED_FT
[facility]  Neurology  [address] [place] [address]  Phone: [phone]  Fax: [phone]  Follow Up Time: 4-6 Days

## 2021-05-21 NOTE — ED PROVIDER NOTE - PATIENT PORTAL LINK FT
You can access the FollowMyHealth Patient Portal offered by [facility] by registering at the following website: [email or web address]. By joining [employer]’s FollowMyHealth portal, you will also be able to view your health information using other applications (apps) compatible with our system.

## 2021-05-21 NOTE — ED PROVIDER NOTE - NSFOLLOWUPINSTRUCTIONS_ED_ALL_ED_FT
You were seen in the emergency room today for headache.    Please call your primary and Neurology doctor to inform them of this ER visit and obtain the next available appointment within the next 5 days. As we discussed, return to the ER if you have any worsening symptoms.    We no longer feel that you need further emergency care or admission to the hospital at this time.    While we have determined that you are currently stable for discharge, we know that things can change. Please seek immediate medical attention or return to the ER if you experience any of the following:  Any worsening or persistent symptoms  Severe Pain  Chest Pain  Difficulty Breathing  Bleeding  Passing Out  Severe Rash  Inability to Eat or Drink  Persistent Fever    Please see a primary care doctor or specialist within 5 days to ensure that you are improving.    Please call the [facility] phone numbers on this document if you have any problems obtaining a follow up appointment.    I wish you well! -Dr Blank

## 2021-05-21 NOTE — ED ADULT TRIAGE NOTE - NS ED NURSE BANDS TYPE
[] : Resident [Time Spent: ___ minutes] : I have spent [unfilled] minutes of face to face time with the patient Name band;

## 2021-05-21 NOTE — ED PROVIDER NOTE - OBJECTIVE STATEMENT
50 y/o female h/o HTN, anxiety, migraine headaches, comes in with a migraine headache persistent for the last 2 weeks, worse today. Pt states this morning she woke up with the headache so she took her migraine medication, the headache went down a bit only return later in the afternoon. Pt reports nausea but no vomiting. No fever. No chest pain. Pt states it feels like a migraine except it has been persistent.

## 2021-05-22 VITALS
HEART RATE: 90 BPM | TEMPERATURE: 98 F | RESPIRATION RATE: 17 BRPM | SYSTOLIC BLOOD PRESSURE: 117 MMHG | DIASTOLIC BLOOD PRESSURE: 87 MMHG | OXYGEN SATURATION: 98 %

## 2021-05-22 PROBLEM — U07.1 COVID-19: Chronic | Status: ACTIVE | Noted: 2020-12-04

## 2021-05-22 PROBLEM — F41.9 ANXIETY DISORDER, UNSPECIFIED: Chronic | Status: ACTIVE | Noted: 2020-12-04

## 2021-05-22 PROCEDURE — 96374 THER/PROPH/DIAG INJ IV PUSH: CPT

## 2021-05-22 PROCEDURE — 83735 ASSAY OF MAGNESIUM: CPT

## 2021-05-22 PROCEDURE — 85025 COMPLETE CBC W/AUTO DIFF WBC: CPT

## 2021-05-22 PROCEDURE — 36415 COLL VENOUS BLD VENIPUNCTURE: CPT

## 2021-05-22 PROCEDURE — 70450 CT HEAD/BRAIN W/O DYE: CPT

## 2021-05-22 PROCEDURE — 80053 COMPREHEN METABOLIC PANEL: CPT

## 2021-05-22 PROCEDURE — 99284 EMERGENCY DEPT VISIT MOD MDM: CPT | Mod: 25

## 2021-05-22 RX ADMIN — Medication 650 MILLIGRAM(S): at 00:00

## 2021-05-22 RX ADMIN — SODIUM CHLORIDE 500 MILLILITER(S): 9 INJECTION INTRAMUSCULAR; INTRAVENOUS; SUBCUTANEOUS at 00:00

## 2021-05-22 RX ADMIN — Medication 30 MILLIGRAM(S): at 00:00

## 2021-05-22 NOTE — ED ADULT NURSE NOTE - CHPI ED NUR SYMPTOMS NEG
no blurred vision/no change in level of consciousness/no confusion/no dizziness/no fever/no loss of consciousness

## 2021-12-24 ENCOUNTER — EMERGENCY (EMERGENCY)
Facility: HOSPITAL | Age: 51
LOS: 1 days | Discharge: HOME | End: 2021-12-24
Attending: EMERGENCY MEDICINE | Admitting: EMERGENCY MEDICINE
Payer: COMMERCIAL

## 2021-12-24 VITALS
HEART RATE: 83 BPM | DIASTOLIC BLOOD PRESSURE: 78 MMHG | RESPIRATION RATE: 16 BRPM | SYSTOLIC BLOOD PRESSURE: 145 MMHG | HEIGHT: 59 IN | OXYGEN SATURATION: 100 % | TEMPERATURE: 98 F

## 2021-12-24 DIAGNOSIS — Z98.89 OTHER SPECIFIED POSTPROCEDURAL STATES: Chronic | ICD-10-CM

## 2021-12-24 LAB
ALBUMIN SERPL ELPH-MCNC: 3.7 G/DL — SIGNIFICANT CHANGE UP (ref 3.3–5)
ALP SERPL-CCNC: 139 U/L — HIGH (ref 40–120)
ALT FLD-CCNC: 29 U/L — SIGNIFICANT CHANGE UP (ref 4–33)
ANION GAP SERPL CALC-SCNC: 11 MMOL/L — SIGNIFICANT CHANGE UP (ref 7–14)
AST SERPL-CCNC: 34 U/L — HIGH (ref 4–32)
BASOPHILS # BLD AUTO: 0.02 K/UL — SIGNIFICANT CHANGE UP (ref 0–0.2)
BASOPHILS NFR BLD AUTO: 0.3 % — SIGNIFICANT CHANGE UP (ref 0–2)
BILIRUB SERPL-MCNC: 0.2 MG/DL — SIGNIFICANT CHANGE UP (ref 0.2–1.2)
BUN SERPL-MCNC: 21 MG/DL — SIGNIFICANT CHANGE UP (ref 7–23)
CALCIUM SERPL-MCNC: 9 MG/DL — SIGNIFICANT CHANGE UP (ref 8.4–10.5)
CHLORIDE SERPL-SCNC: 102 MMOL/L — SIGNIFICANT CHANGE UP (ref 98–107)
CO2 SERPL-SCNC: 25 MMOL/L — SIGNIFICANT CHANGE UP (ref 22–31)
CREAT SERPL-MCNC: 0.79 MG/DL — SIGNIFICANT CHANGE UP (ref 0.5–1.3)
EOSINOPHIL # BLD AUTO: 0.13 K/UL — SIGNIFICANT CHANGE UP (ref 0–0.5)
EOSINOPHIL NFR BLD AUTO: 1.7 % — SIGNIFICANT CHANGE UP (ref 0–6)
GLUCOSE SERPL-MCNC: 102 MG/DL — HIGH (ref 70–99)
HCG SERPL-ACNC: <5 MIU/ML — SIGNIFICANT CHANGE UP
HCT VFR BLD CALC: 34.5 % — SIGNIFICANT CHANGE UP (ref 34.5–45)
HGB BLD-MCNC: 11 G/DL — LOW (ref 11.5–15.5)
IANC: 3.62 K/UL — SIGNIFICANT CHANGE UP (ref 1.5–8.5)
IMM GRANULOCYTES NFR BLD AUTO: 0.1 % — SIGNIFICANT CHANGE UP (ref 0–1.5)
LYMPHOCYTES # BLD AUTO: 3.01 K/UL — SIGNIFICANT CHANGE UP (ref 1–3.3)
LYMPHOCYTES # BLD AUTO: 40.2 % — SIGNIFICANT CHANGE UP (ref 13–44)
MCHC RBC-ENTMCNC: 26.5 PG — LOW (ref 27–34)
MCHC RBC-ENTMCNC: 31.9 GM/DL — LOW (ref 32–36)
MCV RBC AUTO: 83.1 FL — SIGNIFICANT CHANGE UP (ref 80–100)
MONOCYTES # BLD AUTO: 0.69 K/UL — SIGNIFICANT CHANGE UP (ref 0–0.9)
MONOCYTES NFR BLD AUTO: 9.2 % — SIGNIFICANT CHANGE UP (ref 2–14)
NEUTROPHILS # BLD AUTO: 3.62 K/UL — SIGNIFICANT CHANGE UP (ref 1.8–7.4)
NEUTROPHILS NFR BLD AUTO: 48.5 % — SIGNIFICANT CHANGE UP (ref 43–77)
NRBC # BLD: 0 /100 WBCS — SIGNIFICANT CHANGE UP
NRBC # FLD: 0 K/UL — SIGNIFICANT CHANGE UP
PLATELET # BLD AUTO: 236 K/UL — SIGNIFICANT CHANGE UP (ref 150–400)
POTASSIUM SERPL-MCNC: 3.9 MMOL/L — SIGNIFICANT CHANGE UP (ref 3.5–5.3)
POTASSIUM SERPL-SCNC: 3.9 MMOL/L — SIGNIFICANT CHANGE UP (ref 3.5–5.3)
PROT SERPL-MCNC: 7.4 G/DL — SIGNIFICANT CHANGE UP (ref 6–8.3)
RBC # BLD: 4.15 M/UL — SIGNIFICANT CHANGE UP (ref 3.8–5.2)
RBC # FLD: 14.7 % — HIGH (ref 10.3–14.5)
SODIUM SERPL-SCNC: 138 MMOL/L — SIGNIFICANT CHANGE UP (ref 135–145)
WBC # BLD: 7.48 K/UL — SIGNIFICANT CHANGE UP (ref 3.8–10.5)
WBC # FLD AUTO: 7.48 K/UL — SIGNIFICANT CHANGE UP (ref 3.8–10.5)

## 2021-12-24 PROCEDURE — 99285 EMERGENCY DEPT VISIT HI MDM: CPT

## 2021-12-24 NOTE — ED PROVIDER NOTE - OBJECTIVE STATEMENT
51 year-old-female, premenopausal, , with history of hypertension, anxiety and migraine headache presents with 2-week of worsening vaginal bleeding. Still gets her regular period. This period started two weeks ago, however noted with worsening vaginal bleeding. Passing blood clots and completely soaking 4-5 pads/day. No lightheadedness. + viral URI symptoms with cough, rh 51 year-old-female, premenopausal, , with history of hypertension, anxiety and migraine headache presents with 2-week of worsening vaginal bleeding. Still gets her regular period. This period started two weeks ago, however noted with worsening vaginal bleeding. Passing blood clots and completely soaking 4-5 pads/day. No lightheadedness. + viral URI symptoms with cough, rhinorrhea. Denies fever, nausea, vomiting, chest pain, shortness of breath, abdominal pain, dysuria, diarrhea, bloody stools.

## 2021-12-24 NOTE — ED PROVIDER NOTE - CARE PLAN
Principal Discharge DX:	Vaginal bleeding  Secondary Diagnosis:	2019 novel coronavirus disease (COVID-19)   1

## 2021-12-24 NOTE — ED PROVIDER NOTE - CLINICAL SUMMARY MEDICAL DECISION MAKING FREE TEXT BOX
Patient is a 51 year-old-female, premenopausal, , with history of hypertension, anxiety and migraine headache presents with 2-week of worsening vaginal bleeding. No clinical signs/symptoms suggesting anemia at the present. Vital signs are normal. Will obtain CBC, CMP, HCG and evaluate with TVUS. Patient is a 51 year-old-female, premenopausal, , with history of hypertension, anxiety and migraine headache presents with 2-week of worsening vaginal bleeding and viral URI symptoms. No clinical signs/symptoms suggesting anemia at the present. Vital signs are normal. Will obtain CBC, CMP, HCG and evaluate with TVUS. Will r/o COVID.

## 2021-12-24 NOTE — ED PROVIDER NOTE - PHYSICAL EXAMINATION
exam: chaperoned by Indio Ortiz RN, no cervical/adnexal tenderness, blood clots visualized in the vaginal vault with moderately accumulating blood General: non-toxic appearing, in no respiratory distress  HEENT: atraumatic, normocephalic; pupils are equal, round and react to light, extraocular movements intact bilaterally without deficits, no conjunctival pallor, mucous membranes moist  Neck: no jugular venous distension, full range of motion  Chest/Lung: clear to auscultation bilaterally, no wheezes/rhonchi/rales  Heart: regular rate and rhythm, no murmur/gallops/rubs  Abdomen: normal bowel sounds, soft, non-tender, non-distended  Extremities: no lower extremity edema, +2 radial pulses bilaterally, +2 dorsalis pedis pulses bilaterally  Musculoskeletal: full range of motion of all 4 extremities with 5/5 strength and normal sensation   Nervous System: alert and oriented, no motor deficits or sensory deficits; CNII-XII grossly intact; no focal neurologic deficits  Skin: no rashes/lacerations noted   exam: chaperoned by Indio Ortiz RN, no cervical/adnexal tenderness, blood clots visualized in the vaginal vault with moderately accumulating blood

## 2021-12-24 NOTE — ED PROVIDER NOTE - NSFOLLOWUPCLINICS_GEN_ALL_ED_FT
[facility]  OB/GYN & Surg  [address] [place] [address]  Phone: [phone]  Fax:   Follow Up Time: 7-10 Days

## 2021-12-24 NOTE — ED ADULT NURSE NOTE - OBJECTIVE STATEMENT
Pt A&Ox4, ambulatory. Pt in NAD, resp equl and unlabored. PT primarily Beninese speaking,  used by MD Bazan. Chaperone for MD Bazan. Pt presents with vaginal bleeding and clotting. pt admits to multiple past abortions. Pt denies; dizziness, headache, syncope, n/v/d, fever/chills, SOB, cp. 20G to R thumb.

## 2021-12-24 NOTE — ED PROVIDER NOTE - NSFOLLOWUPINSTRUCTIONS_ED_ALL_ED_FT
You were seen in the emergency department for vaginal bleeding. Your hemoglobin is stable. We test you for COVID and you are positive for COVID. Please follow the instructions on provided coronavirus discharge educational forms and if needed self quarantine for 14 days.     Please follow up with your primary care doctor for continuation of care.   Please follow up with gynecology for further management of vaginal bleeding.     Return to the emergency department if you experience any new/concerning/worsening symptoms such as but not limited to: fever (>100.3F), intractable nausea, vomiting, severe abdominal pain, worsening vaginal bleeding or any other concerning symptoms.       1. STAY HOME for 14 DAYS  2. Minimize Human contact to ONLY ESSENTIAL  3. Every time you wash your hands, sing the HAPPY BIRTHDAY Song so you know you're washing long enough.  Make sure to scrub the webspace between your fingers.  4. DRINK 1-3 Liters of fluids day x at least 5 days.  To remain hydrated. Your fatigue, lightheadedness, and body aches will decrease and your fever has a better chance of breaking if you are well hydrated.    5. For your Fever and Body aches takes Tylenol 650-100mg every 4-6h (max 4000mg/day). Try not to use ibuprofen, aspirin or naproxen (Advil, Motrin or Aleve) as these may worsen Coronavirus infection.  6. Use an inhaler for mild shortness of breath and cough  7. RETURN TO THE ER IMMEDIATELY IF YOU HAVE WORSENING SHORTNESS OF BREATH  8. TAKE THE FOLLOWING SUPPLEMENTS DAILY.        VITAMIN C 1000MG ONCE DAILY.        VITAMIN D 200IU ONCE DAILY.        ZINC 50MG ONCE DAILY  ----------------------------------------------  La atendieron en el departamento de emergencias por sangrado vaginal. Tu hemoglobina es estable. Le hacemos shazia prueba de COVID y da positivo en COVID. Siga las instrucciones en los formularios educativos de descarga de coronavirus proporcionados y, si es necesario, realice la auto cuarentena saundra 14 días.    Maulik un seguimiento con lin médico de atención primaria para continuar con la atención. Maulik un seguimiento con el ginecólogo para un mayor control del sangrado vaginal.      Regrese a la carina de emergencias si experimenta algún síntoma nuevo / preocupante / que empeora, poppy, entre otros: fiebre (> 100,3 F), náuseas intratables, vómitos, dolor abdominal intenso, empeoramiento del sangrado vaginal o cualquier otro síntoma preocupante.       1. QUEDARSE EN CASA 14 DÍAS   2. Minimice el contacto humano a ÚNICAMENTE ESENCIAL   3. Cada vez que se lave las felisa, brodie la canción AVENDANO CUMPLEAÑOS para saber que se está lavando el tiempo suficiente. Asegúrese de restregar el espacio web entre los dedos.   4. BEBER 1-3 Litros de líquidos al día x al menos 5 días. Para mantenerse hidratado. Lin fatiga, aturdimiento y ruth corporales disminuirán y lin fiebre tiene más posibilidades de desaparecer si está winsome hidratado.   5. Para la fiebre y los ruth corporales, tome Tylenol 650-100 mg cada 4-6 h (faustino 4000 mg / día). Trate de no usar ibuprofeno, aspirina o naproxeno (Advil, Motrin o Aleve) ya que pueden empeorar la infección por coronavirus.   6. Use un inhalador para la tos y la falta de aire leve   7. REGRESE INMEDIATAMENTE A LA URBANIZACIÓN SI TIENE SHAZIA INCORRECCIÓN DE RESPIRACIÓN   8. TOME LOS SIGUIENTES SUPLEMENTOS DIARIAMENTE.     VITAMINA C 1000MG SHAZIA VEZ AL DÍA.     VITAMINA D 200 UI SHAZIA VEZ AL DÍA.     ZINC 50MG SHAZIA VEZ AL DÍA

## 2021-12-24 NOTE — ED PROVIDER NOTE - ATTENDING CONTRIBUTION TO CARE
51F p/w 2 weeks of vaginal bleeding, painless. No dizziness/lightheadedness/cp/sob. Has not seen a gynecologist for many years. Also describing a few days of flu like illness. Plan for labs, TVUS, covid swab, if all normal would encourage pcp and gyn f/u

## 2021-12-24 NOTE — ED ADULT TRIAGE NOTE - CHIEF COMPLAINT QUOTE
Pt c/o vaginal bleeding x 2 weeks. Pt states this week it became worse and started passing clots. Pt states she has been going through 4-5 pads a day. Pt denies any blood thinner use. No complaints of chest pain, headache, nausea, dizziness, vomiting  SOB, fever, chills verbalized.

## 2021-12-24 NOTE — ED PROVIDER NOTE - PROGRESS NOTE DETAILS
Beni PGY1  Repeat CBC stable. COVID+  Pending TVUS. Beni PGY1   [id number] Beni PGY1  TVUS showed uterine fibroids, nonspecific 1cm cystic focus in the fundal endometrium, and also 3.5 cm right ovarian cyst. Patient stable for discharge.   Discharge instruction givens via  [id number]. Patient voiced understanding and will follow up with gyn.

## 2021-12-24 NOTE — ED PROVIDER NOTE - PATIENT PORTAL LINK FT
You can access the FollowMyHealth Patient Portal offered by [facility] by registering at the following website: [email or web address]. By joining [employer]’s FollowMyHealth portal, you will also be able to view your health information using other applications (apps) compatible with our system.

## 2021-12-24 NOTE — ED PROVIDER NOTE - NSPTACCESSSVCSAPPTDETAILS_ED_ALL_ED_FT
Please schedule an appointment with gyn for follow up appointment within 1-2 weeks for further management of vaginal bleeding and findings on TVUS.

## 2021-12-25 VITALS
HEART RATE: 81 BPM | RESPIRATION RATE: 17 BRPM | TEMPERATURE: 98 F | SYSTOLIC BLOOD PRESSURE: 134 MMHG | DIASTOLIC BLOOD PRESSURE: 67 MMHG | OXYGEN SATURATION: 99 %

## 2021-12-25 LAB
BASOPHILS # BLD AUTO: 0.03 K/UL — SIGNIFICANT CHANGE UP (ref 0–0.2)
BASOPHILS NFR BLD AUTO: 0.4 % — SIGNIFICANT CHANGE UP (ref 0–2)
EOSINOPHIL # BLD AUTO: 0.14 K/UL — SIGNIFICANT CHANGE UP (ref 0–0.5)
EOSINOPHIL NFR BLD AUTO: 1.8 % — SIGNIFICANT CHANGE UP (ref 0–6)
FLUAV AG NPH QL: SIGNIFICANT CHANGE UP
FLUBV AG NPH QL: SIGNIFICANT CHANGE UP
HCT VFR BLD CALC: 34.5 % — SIGNIFICANT CHANGE UP (ref 34.5–45)
HGB BLD-MCNC: 11.5 G/DL — SIGNIFICANT CHANGE UP (ref 11.5–15.5)
IANC: 4.05 K/UL — SIGNIFICANT CHANGE UP (ref 1.5–8.5)
IMM GRANULOCYTES NFR BLD AUTO: 0.3 % — SIGNIFICANT CHANGE UP (ref 0–1.5)
LYMPHOCYTES # BLD AUTO: 2.98 K/UL — SIGNIFICANT CHANGE UP (ref 1–3.3)
LYMPHOCYTES # BLD AUTO: 37.3 % — SIGNIFICANT CHANGE UP (ref 13–44)
MCHC RBC-ENTMCNC: 26.3 PG — LOW (ref 27–34)
MCHC RBC-ENTMCNC: 33.3 GM/DL — SIGNIFICANT CHANGE UP (ref 32–36)
MCV RBC AUTO: 78.9 FL — LOW (ref 80–100)
MONOCYTES # BLD AUTO: 0.76 K/UL — SIGNIFICANT CHANGE UP (ref 0–0.9)
MONOCYTES NFR BLD AUTO: 9.5 % — SIGNIFICANT CHANGE UP (ref 2–14)
NEUTROPHILS # BLD AUTO: 4.05 K/UL — SIGNIFICANT CHANGE UP (ref 1.8–7.4)
NEUTROPHILS NFR BLD AUTO: 50.7 % — SIGNIFICANT CHANGE UP (ref 43–77)
NRBC # BLD: 0 /100 WBCS — SIGNIFICANT CHANGE UP
NRBC # FLD: 0 K/UL — SIGNIFICANT CHANGE UP
PLATELET # BLD AUTO: 245 K/UL — SIGNIFICANT CHANGE UP (ref 150–400)
RBC # BLD: 4.37 M/UL — SIGNIFICANT CHANGE UP (ref 3.8–5.2)
RBC # FLD: 14.6 % — HIGH (ref 10.3–14.5)
RSV RNA NPH QL NAA+NON-PROBE: SIGNIFICANT CHANGE UP
SARS-COV-2 RNA SPEC QL NAA+PROBE: DETECTED
WBC # BLD: 7.98 K/UL — SIGNIFICANT CHANGE UP (ref 3.8–10.5)
WBC # FLD AUTO: 7.98 K/UL — SIGNIFICANT CHANGE UP (ref 3.8–10.5)

## 2021-12-25 PROCEDURE — 76830 TRANSVAGINAL US NON-OB: CPT | Mod: 26

## 2021-12-27 ENCOUNTER — EMERGENCY (EMERGENCY)
Facility: HOSPITAL | Age: 51
LOS: 1 days | Discharge: HOME | End: 2021-12-27
Attending: EMERGENCY MEDICINE | Admitting: EMERGENCY MEDICINE
Payer: COMMERCIAL

## 2021-12-27 VITALS
TEMPERATURE: 97 F | OXYGEN SATURATION: 97 % | HEART RATE: 72 BPM | RESPIRATION RATE: 17 BRPM | SYSTOLIC BLOOD PRESSURE: 123 MMHG | DIASTOLIC BLOOD PRESSURE: 67 MMHG

## 2021-12-27 VITALS
HEART RATE: 81 BPM | RESPIRATION RATE: 18 BRPM | WEIGHT: 179.9 LBS | HEIGHT: 59 IN | SYSTOLIC BLOOD PRESSURE: 112 MMHG | OXYGEN SATURATION: 100 % | DIASTOLIC BLOOD PRESSURE: 69 MMHG | TEMPERATURE: 99 F

## 2021-12-27 DIAGNOSIS — Z98.89 OTHER SPECIFIED POSTPROCEDURAL STATES: Chronic | ICD-10-CM

## 2021-12-27 LAB
BASOPHILS # BLD AUTO: 0.03 K/UL — SIGNIFICANT CHANGE UP (ref 0–0.2)
BASOPHILS NFR BLD AUTO: 0.4 % — SIGNIFICANT CHANGE UP (ref 0–2)
EOSINOPHIL # BLD AUTO: 0.09 K/UL — SIGNIFICANT CHANGE UP (ref 0–0.5)
EOSINOPHIL NFR BLD AUTO: 1.3 % — SIGNIFICANT CHANGE UP (ref 0–6)
HCG SERPL-ACNC: <5 MIU/ML — SIGNIFICANT CHANGE UP
HCT VFR BLD CALC: 35.5 % — SIGNIFICANT CHANGE UP (ref 34.5–45)
HGB BLD-MCNC: 11.1 G/DL — LOW (ref 11.5–15.5)
IANC: 3.24 K/UL — SIGNIFICANT CHANGE UP (ref 1.5–8.5)
IMM GRANULOCYTES NFR BLD AUTO: 0.4 % — SIGNIFICANT CHANGE UP (ref 0–1.5)
LYMPHOCYTES # BLD AUTO: 3.24 K/UL — SIGNIFICANT CHANGE UP (ref 1–3.3)
LYMPHOCYTES # BLD AUTO: 45.2 % — HIGH (ref 13–44)
MCHC RBC-ENTMCNC: 25.9 PG — LOW (ref 27–34)
MCHC RBC-ENTMCNC: 31.3 GM/DL — LOW (ref 32–36)
MCV RBC AUTO: 82.8 FL — SIGNIFICANT CHANGE UP (ref 80–100)
MONOCYTES # BLD AUTO: 0.54 K/UL — SIGNIFICANT CHANGE UP (ref 0–0.9)
MONOCYTES NFR BLD AUTO: 7.5 % — SIGNIFICANT CHANGE UP (ref 2–14)
NEUTROPHILS # BLD AUTO: 3.24 K/UL — SIGNIFICANT CHANGE UP (ref 1.8–7.4)
NEUTROPHILS NFR BLD AUTO: 45.2 % — SIGNIFICANT CHANGE UP (ref 43–77)
NRBC # BLD: 0 /100 WBCS — SIGNIFICANT CHANGE UP
NRBC # FLD: 0 K/UL — SIGNIFICANT CHANGE UP
PLATELET # BLD AUTO: 262 K/UL — SIGNIFICANT CHANGE UP (ref 150–400)
RBC # BLD: 4.29 M/UL — SIGNIFICANT CHANGE UP (ref 3.8–5.2)
RBC # FLD: 15.1 % — HIGH (ref 10.3–14.5)
WBC # BLD: 7.17 K/UL — SIGNIFICANT CHANGE UP (ref 3.8–10.5)
WBC # FLD AUTO: 7.17 K/UL — SIGNIFICANT CHANGE UP (ref 3.8–10.5)

## 2021-12-27 PROCEDURE — 99284 EMERGENCY DEPT VISIT MOD MDM: CPT

## 2021-12-27 NOTE — ED ADULT NURSE NOTE - OBJECTIVE STATEMENT
received pt in intake room 6 51 yr/o female American speaking ambulatory at baseline. presented to the ED C/O vaginal bleeding for 2 weeks. States its on-going, was seen on 12/24 for same complaint.  States she has been bleeding since ED visit. denies chest pain and SOB, RR even and unlabored. denies N/V/D/C and abdominal pain. labs drawn and sent will continue to monitor.

## 2021-12-27 NOTE — ED PROVIDER NOTE - PROGRESS NOTE DETAILS
pt stable, cbc unchanged. plan dc home w close OBGYN follow up. pt stable, cbc unchanged. plan dc home w close OBGYN follow up. DW pt in East Timorese

## 2021-12-27 NOTE — ED PROVIDER NOTE - PHYSICAL EXAMINATION
Dr Valenzuela  mmm. nontoxic  normal S1-S2  No resp distress. able to speak in full and clear sentences. no wheeze, rales or stridor.  soft nontender abdomen. no  rebound. no guarding. no sign of trauma. no CVAT  chaperone [place], tech, no cmt no adnexal tenderness +clot in the vagina, cleared it, no active bleeding. no rash/vesicles   no pedal edema. no calf tenderness. normal pulses bilateral feet.

## 2021-12-27 NOTE — ED PROVIDER NOTE - PATIENT PORTAL LINK FT
You can access the FollowMyHealth Patient Portal offered by [facility] by registering at the following website: [email or web address]. By joining [employer]’s FollowMyHealth portal, you will also be able to view your health information using other applications (apps) compatible with our system.

## 2021-12-27 NOTE — ED PROVIDER NOTE - OBJECTIVE STATEMENT
Dr Valenzuela  52yo HTN, migraine pw vaginal bleeding x weeks. States its on-going, was seen in ED  21 nl cbc and us done cw fibroids. States she has been bleeding since ED visit. Denies any  chest pain. no SOB not dizzy or lightheaded. Has regular menstrual cycle until November. no OCP    no cough. no fever. no n/v/d no abdominal pain. No AC> nonsmoker. Dr Valenzuela  50yo HTN, migraine pw vaginal bleeding x weeks. States its on-going, was seen in ED  21 nl cbc and us done cw fibroids. States she has been bleeding since ED visit. Denies any  chest pain. no SOB  dx covid two weeks ago per pt, feeling better from covid. not dizzy or lightheaded. Has regular menstrual cycle until November. no OCP    no cough. no fever. no n/v/d no abdominal pain. No AC> nonsmoker.

## 2021-12-27 NOTE — ED ADULT NURSE NOTE - CHIEF COMPLAINT QUOTE
LMP November 25th.  c/o 3 weeks vaginal bleeding with intermittent large clots. Pt was here on Dec 23 and dx + COVID  in addition to vaginal ultrasound:  + fibroids.  Denies abd pain. Denies dizziness.  ipad used in triage [facility] #ID # [id number]    [facility]- HTN

## 2022-01-04 ENCOUNTER — APPOINTMENT (OUTPATIENT)
Dept: OBGYN | Facility: CLINIC | Age: 52
End: 2022-01-04
Payer: COMMERCIAL

## 2022-01-04 ENCOUNTER — APPOINTMENT (OUTPATIENT)
Dept: ANTEPARTUM | Facility: CLINIC | Age: 52
End: 2022-01-04
Payer: COMMERCIAL

## 2022-01-04 VITALS
SYSTOLIC BLOOD PRESSURE: 132 MMHG | DIASTOLIC BLOOD PRESSURE: 82 MMHG | WEIGHT: 194 LBS | HEIGHT: 59 IN | BODY MASS INDEX: 39.11 KG/M2

## 2022-01-04 PROCEDURE — 76830 TRANSVAGINAL US NON-OB: CPT

## 2022-01-04 PROCEDURE — 76376 3D RENDER W/INTRP POSTPROCES: CPT

## 2022-01-04 PROCEDURE — 99204 OFFICE O/P NEW MOD 45 MIN: CPT

## 2022-01-04 PROCEDURE — 76857 US EXAM PELVIC LIMITED: CPT

## 2022-01-04 NOTE — PHYSICAL EXAM
[Chaperone Present] : A chaperone was present in the examining room during all aspects of the physical examination [Appropriately responsive] : appropriately responsive [Alert] : alert [No Acute Distress] : no acute distress [Soft] : soft [Non-tender] : non-tender [Non-distended] : non-distended [No HSM] : No HSM [No Lesions] : no lesions [No Mass] : no mass [Oriented x3] : oriented x3 [FreeTextEntry1] : Saranya

## 2022-01-04 NOTE — PLAN
[FreeTextEntry1] : 52 yo female present for gyn sono s/p vaginal bleeding x 1 month:\par - right ovarian cyst 3.5 cm\par -possible polyp seen on sono\par -f/u for saline sono with Dr. Cox

## 2022-01-04 NOTE — HISTORY OF PRESENT ILLNESS
[Regular Cycle Intervals] : periods have been regular [No] : Patient does not have concerns regarding sex [FreeTextEntry1] : 12/5/21

## 2022-01-20 ENCOUNTER — APPOINTMENT (OUTPATIENT)
Dept: OBGYN | Facility: CLINIC | Age: 52
End: 2022-01-20
Payer: COMMERCIAL

## 2022-01-20 ENCOUNTER — APPOINTMENT (OUTPATIENT)
Dept: ANTEPARTUM | Facility: CLINIC | Age: 52
End: 2022-01-20

## 2022-01-20 VITALS — BODY MASS INDEX: 38.17 KG/M2 | SYSTOLIC BLOOD PRESSURE: 134 MMHG | WEIGHT: 189 LBS | DIASTOLIC BLOOD PRESSURE: 83 MMHG

## 2022-01-20 DIAGNOSIS — E66.9 OBESITY, UNSPECIFIED: ICD-10-CM

## 2022-01-20 DIAGNOSIS — G43.909 MIGRAINE, UNSPECIFIED, NOT INTRACTABLE, W/OUT STATUS MIGRAINOSUS: ICD-10-CM

## 2022-01-20 DIAGNOSIS — Z86.39 PERSONAL HISTORY OF OTHER ENDOCRINE, NUTRITIONAL AND METABOLIC DISEASE: ICD-10-CM

## 2022-01-20 DIAGNOSIS — G44.40 DRUG-INDUCED HEADACHE, NOT ELSEWHERE CLASSIFIED, NOT INTRACTABLE: ICD-10-CM

## 2022-01-20 DIAGNOSIS — N93.9 ABNORMAL UTERINE AND VAGINAL BLEEDING, UNSPECIFIED: ICD-10-CM

## 2022-01-20 PROCEDURE — 99212 OFFICE O/P EST SF 10 MIN: CPT

## 2022-01-20 PROCEDURE — 76831 ECHO EXAM UTERUS: CPT

## 2022-02-04 ENCOUNTER — APPOINTMENT (OUTPATIENT)
Dept: OBGYN | Facility: CLINIC | Age: 52
End: 2022-02-04
Payer: COMMERCIAL

## 2022-02-04 VITALS
DIASTOLIC BLOOD PRESSURE: 86 MMHG | SYSTOLIC BLOOD PRESSURE: 138 MMHG | HEIGHT: 59 IN | BODY MASS INDEX: 39.51 KG/M2 | WEIGHT: 196 LBS

## 2022-02-04 PROCEDURE — 99213 OFFICE O/P EST LOW 20 MIN: CPT

## 2022-02-04 NOTE — PLAN
[FreeTextEntry1] : 50 y/o F with AUB-P, failed saline sonogram opting for D&C with operative hysteroscopy \par - Patient to be scheduled for D&C hysteroscopy , polypectomy with Myosure\par -Risks (bleeding, infection, damage to surrounding structures including uterine perforation), benefits and alternatives discussed\par - All questions/concerns addressed\par -medical clearance to be obtained and PST and Covid testing to be scheduled \par \par

## 2022-02-04 NOTE — HISTORY OF PRESENT ILLNESS
[FreeTextEntry1] : Patient with h/o AUB, sonogram showing likely endometrial polyp. I explained that her AUB is likely due to her endometrial polyp. A saline sonogram was attempted in the office but unable to be completed due to cervical stenosis. Options for continued observation, medical management and surgical management were discussed. The patient is opting for D&C hysteroscopy at this time. \par \par The indications, risks, benefits and alternatives were including, but not limited to, bleeding, infection, injury to surrounding organs, uterine perforation and fluid overload were discussed at length. The patient expressed an understanding of the treatment rationale and her questions were answered to her apparent satisfaction.\par

## 2022-02-04 NOTE — PHYSICAL EXAM
[Appropriately responsive] : appropriately responsive [Alert] : alert [No Acute Distress] : no acute distress [Soft] : soft [Non-tender] : non-tender [Non-distended] : non-distended [No HSM] : No HSM [No Lesions] : no lesions [No Mass] : no mass [Oriented x3] : oriented x3 [FreeTextEntry3] : supple [FreeTextEntry5] : Normal respiratory effort

## 2022-04-17 PROBLEM — G44.40 MEDICATION OVERUSE HEADACHE: Status: RESOLVED | Noted: 2018-05-29 | Resolved: 2022-04-17

## 2022-04-17 PROBLEM — Z86.39 HISTORY OF HYPOTHYROIDISM: Status: RESOLVED | Noted: 2019-01-23 | Resolved: 2022-04-17

## 2022-04-17 PROBLEM — Z86.39 HISTORY OF THYROID NODULE: Status: RESOLVED | Noted: 2019-02-12 | Resolved: 2022-04-17

## 2022-04-17 PROBLEM — E66.9 OBESITY (BMI 30.0-34.9): Status: RESOLVED | Noted: 2018-06-21 | Resolved: 2022-04-17

## 2022-04-17 PROBLEM — N93.9 ABNORMAL UTERINE AND VAGINAL BLEEDING, UNSPECIFIED: Status: ACTIVE | Noted: 2022-01-04

## 2022-04-17 PROBLEM — G43.909 MIGRAINE: Status: RESOLVED | Noted: 2018-04-24 | Resolved: 2022-04-17

## 2022-04-17 NOTE — PLAN
[FreeTextEntry1] : 51 year old F presenting for saline hysterosonogram due to AUB.\par -saline infusion sonohysterography was attempted, but unsuccessful due to cervical stenosis (please see procedure note for full details)\par -patient advised that she will need hysteroscopy, with possible D&C, in the OR\par -f/u PRN with Dr. Santiago\par \par =============================================\par I, Jamilah Taylor, acted solely as a scribe for Dr. Burak Cox on Jan 20 2022  2:20PM. All medical entries made by the Scribe were at my, Dr. Burak Cox's, direction and personally dictated by me on Jan 20 2022  2:20PM . I have reviewed the chart and agree that the record accurately reflects my personal performance of the history, physical exam, assessment, and plan. I have also personally directed, reviewed, and agreed with the chart.\par =============================================

## 2022-04-17 NOTE — HISTORY OF PRESENT ILLNESS
[FreeTextEntry1] : Patient is a 51 year old presenting with abnormal uterine bleeding. Patient still gets her menses every month. She has been experiencing AUB since her period starting on 12/5/21, described as unusually heavy bleeding. Patient reports the bleeding only stopped 5 days ago, on 1/15/22, and has not started again.\par \par During previous visit on 1/4/2022, patient had GYN ultrasound showing enlarged uterus suggestive of adenomyosis and possible endometrial polyp. She presents to the office today for saline sonogram to better visualize this polyp.

## 2022-06-10 NOTE — ED ADULT NURSE NOTE - NSIMPLEMENTINTERV_GEN_ALL_ED
Abdomen , soft, nontender, nondistended , no guarding or rigidity , no masses palpable , normal bowel sounds , Liver and Spleen , no hepatomegaly present , liver nontender , spleen not palpable
Implemented All Universal Safety Interventions:  [place] to call system. Call bell, personal items and telephone within reach. Instruct patient to call for assistance. Room bathroom lighting operational. Non-slip footwear when patient is off stretcher. Physically safe environment: no spills, clutter or unnecessary equipment. Stretcher in lowest position, wheels locked, appropriate side rails in place.

## 2022-07-19 NOTE — ED ADULT TRIAGE NOTE - CHIEF COMPLAINT QUOTE
LMP November 25th.  c/o 3 weeks vaginal bleeding with intermittent large clots. Pt was here on Dec 23 and dx + COVID  in addition to vaginal ultrasound:  + fibroids.  Denies abd pain. Denies dizziness.  ipad used in triage [facility] #ID # [id number]    [facility]- HTN Medications

## 2023-04-24 NOTE — ED PROVIDER NOTE - DISPOSITION TYPE
The patient is Stable - Low risk of patient condition declining or worsening    Shift Goals  Clinical Goals: VSS, monitor Na+, neuro checks, fluid restriction, good night sleep  Patient Goals: go for a walk outside  Family Goals: marin    Progress made toward(s) clinical / shift goals:    Problem: Urinary - Renal Perfusion  Goal: Ability to achieve and maintain adequate renal perfusion and functioning will improve  Outcome: Progressing  Note: Pt voiding adequately.  However, frequency noted by this RN.     Problem: Respiratory  Goal: Patient will achieve/maintain optimum respiratory ventilation and gas exchange  Outcome: Progressing  Note: Pt continues to require supplemental o2     Problem: Skin Integrity  Goal: Skin integrity is maintained or improved  Outcome: Progressing  Note: Interventions in place.     Problem: Fall Risk  Goal: Patient will remain free from falls  Outcome: Progressing  Note: Interventions in place.     Problem: Pain - Standard  Goal: Alleviation of pain or a reduction in pain to the patient’s comfort goal  Outcome: Progressing  Note: Pt denying pain for this RN.   DISCHARGE

## 2023-06-06 ENCOUNTER — EMERGENCY (EMERGENCY)
Facility: HOSPITAL | Age: 53
LOS: 1 days | Discharge: HOME | End: 2023-06-06
Attending: EMERGENCY MEDICINE
Payer: MEDICAID

## 2023-06-06 VITALS
DIASTOLIC BLOOD PRESSURE: 89 MMHG | RESPIRATION RATE: 17 BRPM | SYSTOLIC BLOOD PRESSURE: 137 MMHG | HEART RATE: 80 BPM | OXYGEN SATURATION: 98 % | WEIGHT: 136.03 LBS | TEMPERATURE: 98 F

## 2023-06-06 DIAGNOSIS — Z98.89 OTHER SPECIFIED POSTPROCEDURAL STATES: Chronic | ICD-10-CM

## 2023-06-06 PROCEDURE — 96361 HYDRATE IV INFUSION ADD-ON: CPT

## 2023-06-06 PROCEDURE — 99284 EMERGENCY DEPT VISIT MOD MDM: CPT

## 2023-06-06 PROCEDURE — 96375 TX/PRO/DX INJ NEW DRUG ADDON: CPT

## 2023-06-06 PROCEDURE — 96374 THER/PROPH/DIAG INJ IV PUSH: CPT

## 2023-06-06 PROCEDURE — 99284 EMERGENCY DEPT VISIT MOD MDM: CPT | Mod: 25

## 2023-06-06 RX ORDER — METOCLOPRAMIDE HCL 10 MG
10 TABLET ORAL ONCE
Refills: 0 | Status: COMPLETED | OUTPATIENT
Start: 2023-06-06 | End: 2023-06-06

## 2023-06-06 RX ORDER — KETOROLAC TROMETHAMINE 30 MG/ML
30 SYRINGE (ML) INJECTION ONCE
Refills: 0 | Status: DISCONTINUED | OUTPATIENT
Start: 2023-06-06 | End: 2023-06-06

## 2023-06-06 RX ORDER — SODIUM CHLORIDE 9 MG/ML
1000 INJECTION INTRAMUSCULAR; INTRAVENOUS; SUBCUTANEOUS ONCE
Refills: 0 | Status: COMPLETED | OUTPATIENT
Start: 2023-06-06 | End: 2023-06-06

## 2023-06-06 RX ADMIN — SODIUM CHLORIDE 1000 MILLILITER(S): 9 INJECTION INTRAMUSCULAR; INTRAVENOUS; SUBCUTANEOUS at 13:01

## 2023-06-06 RX ADMIN — Medication 10 MILLIGRAM(S): at 12:01

## 2023-06-06 RX ADMIN — SODIUM CHLORIDE 1000 MILLILITER(S): 9 INJECTION INTRAMUSCULAR; INTRAVENOUS; SUBCUTANEOUS at 12:02

## 2023-06-06 RX ADMIN — Medication 30 MILLIGRAM(S): at 12:31

## 2023-06-06 RX ADMIN — Medication 30 MILLIGRAM(S): at 12:01

## 2023-06-06 NOTE — ED PROVIDER NOTE - NSFOLLOWUPINSTRUCTIONS_ED_ALL_ED_FT
Follow-up with your neurologo within 1 semana.            Cefalea migrañosa  Migraine Headache  Shazia cefalea migrañosa es un dolor intenso y punzante en greg o ambos lados de la samantha. Las cefaleas migrañosas también pueden causar otros síntomas, poppy náuseas, vómitos y sensibilidad a la claudette y el ruido. Shazia cefalea migrañosa puede durar desde 4 horas hasta 3 días. Hable con lin médico sobre los factores que pueden causar (desencadenar) las cefaleas migrañosas.    ¿Cuáles son las causas?  Se desconoce la causa exacta de esta afección. Sin embargo, shazia migraña puede aparecer cuando los nervios del cerebro se irritan y liberan ciertas sustancias químicas que causan inflamación de los vasos sanguíneos. Esta inflamación provoca dolor. Esta afección puede desencadenarse o ser causada por lo siguiente:  Consumo de alcohol.  Consumo de cigarrillos.  Kulwinder medicamentos poppy por ejemplo:  Medicamentos para aliviar el dolor torácico (nitroglicerina).  Anticonceptivos orales.  Estrógeno.  Ciertos medicamentos para la presión arterial.  [place] o beber productos que contienen nitratos, glutamato, aspartamo o tiramina. Los quesos añejados, el chocolate o la cafeína también pueden ser desencadenantes.  Hacer actividad física.  Otros factores que pueden provocar cefalea migrañosa son los siguientes:  Menstruación.  Embarazo.  Hambre.  Estrés.  Dormir poco o dormir demasiado.  Cambios climáticos.  Fatiga.  ¿Qué incrementa el riesgo?  Los siguientes factores pueden hacer que usted sea más propenso a tener migrañas:  Tener cierta edad. Es más probable que esta afección se manifieste en personas que tienen entre 25 y 55 años.  Ser jack.  Tener antecedentes familiares de cefalea migrañosa.  Ser de myron caucásica.  Tener shazia afección de ben mental, poppy depresión o ansiedad.  Ser tony.  ¿Cuáles son los signos o los síntomas?  El principal síntoma de esta afección es el dolor intenso y punzante. Mirta dolor puede tener las siguientes características:  Puede aparecer en cualquier región de la samantha, tanto de un lado poppy de ambos.  Puede interferir con las actividades de la amarjit cotidiana.  Puede empeorar con la actividad física.  Puede empeorar ante la exposición a luces brillantes o a ruidos andrew.  Otros síntomas pueden incluir:  Náuseas.  Vómitos.  Mareos.  Sensibilidad general a las luces brillantes, a los ruidos andrew o a los olores.  Antes de tener shazia cefalea migrañosa, puede recibir señales de advertencia (aura). Un aura puede incluir:  Kamar luces intermitentes o tener puntos ciegos.  Kamar puntos brillantes, halos o líneas en zigzag.  Tener shazia visión en túnel o visión borrosa.  Sentir entumecimiento u hormigueo.  Tener dificultad para hablar.  Debilidad muscular.  Algunas personas tienen síntomas después de shazia cefalea migrañosa (fase posdromal), poppy los siguientes:  Cansancio.  Dificultad para concentrarte.  ¿Cómo se diagnostica?  La cefalea migrañosa se diagnostica en función de lo siguiente:  Sarah síntomas.  Un examen físico.  Pruebas, poppy, por ejemplo:  Tomografía computarizada (TC) o resonancia magnética (RM) de la samantha. Estos estudios de diagnóstico por imágenes pueden ayudar a descartar otras causas de cefalea.  Kulwinder shazia muestra de líquido de la médula tellez (punción lumbar) para analizar (análisis de líquido cefalorraquídeo o análisis de LCR).  ¿Cómo se trata?  Esta afección se puede tratar con medicamentos para:  Aliviar el dolor.  Aliviar las náuseas.  Prevenir las cefaleas migrañosas.  El tratamiento de esta afección también puede incluir lo siguiente:  Acupuntura.  Cambios en el estilo de amarjit, poppy evitar los alimentos que provocan las cefaleas migrañosas.  Biorretroalimentación.  Terapia cognitiva conductual.  Siga estas instrucciones en lin casa:  Medicamentos    Use los medicamentos de venta tessie y los recetados solamente poppy se lo haya indicado el médico.  Pregúntele al médico si el medicamento recetado:  Hace que sea necesario que evite conducir o usar maquinaria pesada.  Puede causarle estreñimiento. Es posible que tenga que kulwinder estas medidas para prevenir o tratar el estreñimiento:  Beber suficiente líquido poppy para mantener la orina de color amarillo pálido.  Kulwinder medicamentos recetados o de venta tessie.  Consumir alimentos ricos en fibra, poppy frijoles, cereales integrales, y frutas y verduras frescas.  Limitar el consumo de alimentos ricos en grasa y azúcares procesados, poppy los alimentos fritos o dulces.  Estilo de amarjit    No stephanie alcohol.  No consuma ningún producto que contenga nicotina o tabaco, poppy cigarrillos, cigarrillos electrónicos y tabaco de mascar. Si necesita ayuda para dejar de fumar, consulte al médico.  Duerma poppy mínimo 8 horas todas las noches.  Encuentre modos de manejar el estrés, por ejemplo, a través de la meditación, la respiración profunda o el yoga.  Indicaciones generales        Lleve un registro diario para averiguar lo que puede provocar las cefaleas migrañosas. Registre, por ejemplo, lo siguiente:  Lo que usted come y jennifer.  El tiempo que duerme.  Algún cambio en lin dieta o en los medicamentos.  Si tiene shazia cefalea migrañosa:  Evite los factores que empeoren los síntomas, poppy las luces brillantes.  Resulta útil acostarse en shazia habitación oscura y silenciosa.  No conduzca vehículos ni opere maquinaria pesada.  Pregúntele al médico qué actividades son seguras para usted cuando tiene síntomas.  Concurra a todas las visitas de seguimiento poppy se lo haya indicado el médico. Dovray es importante.  Comuníquese con un médico si:  Tiene síntomas de cefalea migrañosa que son distintos o más intensos que los habituales.  Tiene más de 15 días de cefalea por mes.  Solicite ayuda inmediatamente si:  La cefalea migrañosa se vuelve cada vez más intensa.  La cefalea migrañosa dura más de 72 horas.  Tiene fiebre.  Presenta rigidez en el galdino.  Presenta pérdida de la visión.  Siente debilidad en los músculos o que no puede controlarlos.  Comienza a perder el equilibrio con frecuencia.  Presenta dificultad para caminar.  Se desmaya.  Tiene shazia convulsión.  Resumen  Shazia cefalea migrañosa es un dolor intenso y punzante en greg o ambos lados de la samantha. Las migrañas también pueden causar otros síntomas, poppy náuseas, vómitos y sensibilidad a la claudette y el ruido.  Esta afección puede tratarse con medicamentos y cambios en el estilo de amarjit. También es posible que deba evitar ciertos factores que desencadenan shazia cefalea migrañosa.  Lleve un registro diario para averiguar lo que puede provocar las cefaleas migrañosas.  Comuníquese con el médico si tiene más de 15 días de cefalea en un mes o presenta síntomas de cefalea migrañosa que son distintos o más intensos que los habituales.  Esta información no tiene poppy fin reemplazar el consejo del médico. Asegúrese de hacerle al médico cualquier pregunta que tenga.    Document Revised: 02/21/2020 Document Reviewed: 02/21/2020  Elsevier Patient Education © 2023 Elsevier Inc.

## 2023-06-06 NOTE — ED ADULT NURSE NOTE - NSFALLUNIVINTERV_ED_ALL_ED
Bed/Stretcher in lowest position, wheels locked, appropriate side rails in place/Call bell, personal items and telephone in reach/Instruct patient to call for assistance before getting out of bed/chair/stretcher/Non-slip footwear applied when patient is off stretcher/[place] to call system/Physically safe environment - no spills, clutter or unnecessary equipment/Purposeful proactive rounding/Room/bathroom lighting operational, light cord in reach

## 2023-06-06 NOTE — ED PROVIDER NOTE - CLINICAL SUMMARY MEDICAL DECISION MAKING FREE TEXT BOX
Will give symptomatic medications for migraine and reassess. Patient has an appointment in a few weeks to see her neurologist.

## 2023-06-06 NOTE — ED PROVIDER NOTE - OBJECTIVE STATEMENT
, [id number]    54 y/o female, history of migraine headaches, feels like she has a similar migraine headache in the left temple area for the last 3 days. She says it feels like the migraines that were diagnosed by neurologist, but not getting better. Took Excedrin w/ no relief. She denies any vomiting, weakness, fever, visual changes, photophobia. She does have some mild nausea. She has had a previous MRI that showed no abnormalities in the brain. She was on Nurtec recently, but her insurance no longer covers it. No smoking, alcohol, drugs. No known drug allergies.

## 2023-06-06 NOTE — ED PROVIDER NOTE - PATIENT PORTAL LINK FT
You can access the FollowMyHealth Patient Portal offered by [facility] by registering at the following website: [email or web address]. By joining [employer]’s FollowMyHealth portal, you will also be able to view your health information using other applications (apps) compatible with our system.

## 2023-08-13 ENCOUNTER — EMERGENCY (EMERGENCY)
Facility: HOSPITAL | Age: 53
LOS: 1 days | Discharge: HOME | End: 2023-08-13
Attending: STUDENT IN AN ORGANIZED HEALTH CARE EDUCATION/TRAINING PROGRAM
Payer: MEDICAID

## 2023-08-13 ENCOUNTER — EMERGENCY (EMERGENCY)
Facility: HOSPITAL | Age: 53
LOS: 1 days | Discharge: HOME | End: 2023-08-13
Attending: EMERGENCY MEDICINE
Payer: MEDICAID

## 2023-08-13 VITALS
TEMPERATURE: 98 F | RESPIRATION RATE: 18 BRPM | DIASTOLIC BLOOD PRESSURE: 80 MMHG | WEIGHT: 127.87 LBS | SYSTOLIC BLOOD PRESSURE: 142 MMHG | OXYGEN SATURATION: 98 % | HEART RATE: 86 BPM

## 2023-08-13 VITALS
HEART RATE: 90 BPM | OXYGEN SATURATION: 98 % | DIASTOLIC BLOOD PRESSURE: 86 MMHG | SYSTOLIC BLOOD PRESSURE: 136 MMHG | WEIGHT: 127.87 LBS | RESPIRATION RATE: 18 BRPM | TEMPERATURE: 98 F

## 2023-08-13 DIAGNOSIS — Z98.89 OTHER SPECIFIED POSTPROCEDURAL STATES: Chronic | ICD-10-CM

## 2023-08-13 PROCEDURE — 96374 THER/PROPH/DIAG INJ IV PUSH: CPT

## 2023-08-13 PROCEDURE — 99284 EMERGENCY DEPT VISIT MOD MDM: CPT

## 2023-08-13 PROCEDURE — 96361 HYDRATE IV INFUSION ADD-ON: CPT

## 2023-08-13 PROCEDURE — 99283 EMERGENCY DEPT VISIT LOW MDM: CPT

## 2023-08-13 PROCEDURE — 99282 EMERGENCY DEPT VISIT SF MDM: CPT

## 2023-08-13 PROCEDURE — 96375 TX/PRO/DX INJ NEW DRUG ADDON: CPT

## 2023-08-13 PROCEDURE — 99284 EMERGENCY DEPT VISIT MOD MDM: CPT | Mod: 25

## 2023-08-13 RX ORDER — METOCLOPRAMIDE HCL 10 MG
10 TABLET ORAL ONCE
Refills: 0 | Status: COMPLETED | OUTPATIENT
Start: 2023-08-13 | End: 2023-08-13

## 2023-08-13 RX ORDER — ACETAMINOPHEN 500 MG
975 TABLET ORAL ONCE
Refills: 0 | Status: COMPLETED | OUTPATIENT
Start: 2023-08-13 | End: 2023-08-13

## 2023-08-13 RX ORDER — KETOROLAC TROMETHAMINE 30 MG/ML
15 SYRINGE (ML) INJECTION ONCE
Refills: 0 | Status: DISCONTINUED | OUTPATIENT
Start: 2023-08-13 | End: 2023-08-13

## 2023-08-13 RX ORDER — SODIUM CHLORIDE 9 MG/ML
1000 INJECTION INTRAMUSCULAR; INTRAVENOUS; SUBCUTANEOUS ONCE
Refills: 0 | Status: COMPLETED | OUTPATIENT
Start: 2023-08-13 | End: 2023-08-13

## 2023-08-13 RX ADMIN — Medication 10 MILLIGRAM(S): at 19:42

## 2023-08-13 RX ADMIN — SODIUM CHLORIDE 1000 MILLILITER(S): 9 INJECTION INTRAMUSCULAR; INTRAVENOUS; SUBCUTANEOUS at 19:43

## 2023-08-13 RX ADMIN — Medication 15 MILLIGRAM(S): at 19:42

## 2023-08-13 RX ADMIN — Medication 15 MILLIGRAM(S): at 20:46

## 2023-08-13 RX ADMIN — Medication 975 MILLIGRAM(S): at 20:24

## 2023-08-13 RX ADMIN — Medication 975 MILLIGRAM(S): at 20:46

## 2023-08-13 RX ADMIN — SODIUM CHLORIDE 1000 MILLILITER(S): 9 INJECTION INTRAMUSCULAR; INTRAVENOUS; SUBCUTANEOUS at 20:46

## 2023-08-13 NOTE — ED PROVIDER NOTE - NSFOLLOWUPINSTRUCTIONS_ED_ALL_ED_FT
You can use 500-1000mg Tylenol every 6 hours for pain - as needed.  This is an over-the-counter medications - please respect the warnings on the label. This medication come with certain risks and side effects that you need to discuss with your doctor, especially if you are taking it for a prolonged period.    You can use 400-600mg Ibuprofen (such as motrin or advil) every 6 to 8 hours as needed for pain control.  Take ibuprofen with food or milk to lessen stomach upset.  This is an over-the-counter medication please respect the warnings on the label. All medications come with certain risks and side effects that you need to discuss with your doctor, especially if you are taking them for a prolonged period.    Migraine Headache  A migraine headache is a very strong throbbing pain on one side or both sides of your head. This type of headache can also cause other symptoms. It can last from 4 hours to 3 days. Talk with your doctor about what things may bring on (trigger) this condition.    What are the causes?  The exact cause of this condition is not known. This condition may be triggered or caused by:  Drinking alcohol.  Smoking.  Taking medicines, such as:  Medicine used to treat chest pain (nitroglycerin).  Birth control pills.  Estrogen.  Some blood pressure medicines.  Eating or drinking certain products.  Doing physical activity.  Other things that may trigger a migraine headache include:  Having a menstrual period.  Pregnancy.  Hunger.  Stress.  Not getting enough sleep or getting too much sleep.  Weather changes.  Tiredness (fatigue).  What increases the risk?  Being 25–55 years old.  Being female.  Having a family history of migraine headaches.  Being .  Having depression or anxiety.  Being very overweight.  What are the signs or symptoms?  A throbbing pain. This pain may:  Happen in any area of the head, such as on one side or both sides.  Make it hard to do daily activities.  Get worse with physical activity.  Get worse around bright lights or loud noises.  Other symptoms may include:  Feeling sick to your stomach (nauseous).  Vomiting.  Dizziness.  Being sensitive to bright lights, loud noises, or smells.  Before you get a migraine headache, you may get warning signs (an aura). An aura may include:  Seeing flashing lights or having blind spots.  Seeing bright spots, halos, or zigzag lines.  Having tunnel vision or blurred vision.  Having numbness or a tingling feeling.  Having trouble talking.  Having weak muscles.  Some people have symptoms after a migraine headache (postdromal phase), such as:  Tiredness.  Trouble thinking (concentrating).  How is this treated?  Taking medicines that:  Relieve pain.  Relieve the feeling of being sick to your stomach.  Prevent migraine headaches.  Treatment may also include:  Having acupuncture.  Avoiding foods that bring on migraine headaches.  Learning ways to control your body functions (biofeedback).  Therapy to help you know and deal with negative thoughts (cognitive behavioral therapy).  Follow these instructions at home:  Medicines    Take over-the-counter and prescription medicines only as told by your doctor.  Ask your doctor if the medicine prescribed to you:  Requires you to avoid driving or using heavy machinery.  Can cause trouble pooping (constipation). You may need to take these steps to prevent or treat trouble pooping:  Drink enough fluid to keep your pee (urine) pale yellow.  Take over-the-counter or prescription medicines.  Eat foods that are high in fiber. These include beans, whole grains, and fresh fruits and vegetables.  Limit foods that are high in fat and sugar. These include fried or sweet foods.  Lifestyle    Do not drink alcohol.  Do not use any products that contain nicotine or tobacco, such as cigarettes, e-cigarettes, and chewing tobacco. If you need help quitting, ask your doctor.  Get at least 8 hours of sleep every night.  Limit and deal with stress.  General instructions        Keep a journal to find out what may bring on your migraine headaches. For example, write down:  What you eat and drink.  How much sleep you get.  Any change in what you eat or drink.  Any change in your medicines.  If you have a migraine headache:  Avoid things that make your symptoms worse, such as bright lights.  It may help to lie down in a dark, quiet room.  Do not drive or use heavy machinery.  Ask your doctor what activities are safe for you.  Keep all follow-up visits as told by your doctor. This is important.  Contact a doctor if:  You get a migraine headache that is different or worse than others you have had.  You have more than 15 headache days in one month.  Get help right away if:  Your migraine headache gets very bad.  Your migraine headache lasts longer than 72 hours.  You have a fever.  You have a stiff neck.  You have trouble seeing.  Your muscles feel weak or like you cannot control them.  You start to lose your balance a lot.  You start to have trouble walking.  You pass out (faint).  You have a seizure.  Summary  A migraine headache is a very strong throbbing pain on one side or both sides of your head. These headaches can also cause other symptoms.  This condition may be treated with medicines and changes to your lifestyle.  Keep a journal to find out what may bring on your migraine headaches.  Contact a doctor if you get a migraine headache that is different or worse than others you have had.  Contact your doctor if you have more than 15 headache days in a month.

## 2023-08-13 NOTE — ED PROVIDER NOTE - CLINICAL SUMMARY MEDICAL DECISION MAKING FREE TEXT BOX
53-year-old female with history of headaches presents with shaking and anxiety after receiving reglan for headache, now asymptomatic. patient stable for discharge.

## 2023-08-13 NOTE — ED PROVIDER NOTE - OBJECTIVE STATEMENT
53-year-old female with history of headaches presents with shaking and anxiety.  Patient was seen in [facility] ED a few hours ago was given Toradol and Reglan with resolution of headache which was her presenting symptom however she started feeling restless and shaky upon discharge.  Reports it worsened after she left the hospital thus she came immediately back to the emergency department.  Reports currently symptoms have completely resolved.  Denies any recurrence of headache.

## 2023-08-13 NOTE — ED PROVIDER NOTE - PATIENT PORTAL LINK FT
You can access the FollowMyHealth Patient Portal offered by [facility] by registering at the following website: [email or web address]. By joining [employer]’s FollowMyHealth portal, you will also be able to view your health information using other applications (apps) compatible with our system.

## 2023-08-13 NOTE — ED PROVIDER NOTE - HIV OFFER
Nolan Mayo  [id number]               Adult CD Progress Note and Treatment Plan Review     Attendance       Monday    Group Date: 6/24/19   Group Attendance Attended group session   Group Therapy Type Addiction   Group Topic Covered Disease of Addiction/Choices in Recovery   Client's Response To Group Topic Confronted peers appropriately. Cooperative with task. Discussed personal experience with topic.   Client Group Participation Detail Highly involved   Group Attendance (Time) 2.0 Hours   Individual Attendance None   Family Attendance None   Other Comments/Information None      Wednesday    Group Date: 6/26/19   Group Attendance Attended group session   Group Therapy Type Addiction   Group Topic Covered Coping Skills/Lifestyle Management, Relapse Prevention and Thinking Errors/Negative Self-Talk   Client's Response To Group Topic Cooperative with task. Discussed personal experience with topic. Expressed readiness to alter behaviors.   Client Group Participation Detail Highly involved   Group Attendance (Time) 2.0 Hours   Individual Attendance None   Family Attendance None   Other Comments/Information None     Total # of Phase 1 Group Sessions: 18 for 35 hours                                  Total # of Phase 2 Group Sessions: 16 for 32 hours, plus 2 sessions of family for 4 hours  Total # of Phase 3 Group Sessions: None  Total # of 1:1 Sessions: 4, plus initial service for 2 hours     Support group attended this week: Yes     Reporting sobriety: Yes- 3/12/19    Treatment Plan Review     Treatment Plan Review completed on:  6/26/2019      Projected discharge date: 9/11/19     Client preferred learning style: Hands on     Staff member(s) contributing: SERJIO Deng, Fabi Giles     Received supervision: Yes (explain) - previously.     Client involvement with treatment planning: contributed to goals and plan.     Client received copy of plan/revised plan: Yes     Client agrees with plan/revised  "plan: Yes     Changes to Treatment Plan: No    Client's Dimension 1 Goal(s) Maintain sobriety & report use       Goal not addressed this week.   Client's Dimension 2 Goal(s) Goals: regular medical check up       Goal not addressed this week.   Client's Dimension 3 Goal(s) Take pHQ-9 and JOSE L-7                 Take medications as directed                  Healthy coping for anxiety       See Below   Client's Dimension 4 Goal(s) Continue to find internal motivation for change       See Below   Client's Dimension 5 Goal(s) Plan for coping with and maintaining sobriety       See below   Client's Dimension 6 Goal(s) Build sober structure and support       See Below       New Goals added since last review: None.    Goal(s) worked on since last review: Dimension  5, 6    Strategies effective: Yes    Care Coordination Activities: None.    Medical, Mental Health and other appointments the client attended: None and none this week, did report  attending  6/19.Medication issues: None.    Physical and mental health problems: None. \"I no longer take medications\"    Review and evaluation of the individual abuse prevention plan: The program's individual abuse prevention plan (IAPP) is sufficient for this client.     Substance Use Disorders:    303.90 (F10.20) Alcohol Use Disorder Severe    ASAM Risk Rating: (Note the rationale for risk rating changes)    Dimension 1 0- No changes, Thanh reports last use 3/12    Dimension 2 0- no changes    Dimension 3 1- No changes Thanh rates his anxiety at 1 out of 10, depression at 2 out of 10, and stress at 0 out of 10    Dimension 4 0- No changes Thanh rates his motivation for sobriety at 9 out of 10    Dimension 5 2- Thanh rates his cravings at a 2 over the last week     Dimension 6 1- Thanh reports attending AA this week, now has a sponsor and spoke with him this week        Data: (Need to include short narrative for the week on what was worked on)  offered feedback good insight client did actively " "participate     On 6/24 Thanh reports he has a sponsor and did talk with him this last week.  He also said he talked with his father, who attends AA. Thanh discussed  that he realizes he is \"powerless\" over his addiction and knows the importance of working a program, including going to AA and meeting with/talking to his sponsor regularly.  He also reiterates he finds it helpful to talk to his Dad who goes to AA . He demonstrated good motivation and coping by getting a sponsor.  On 6/26 he discussed Warning signs and cues assignment.  He verbalized understanding that he is aware that anger is a warning sign for him, \"but I am working a good program of using my coping skills\".  \"I am aware that my expectations often cause my own anger' He said he feels he has become more patient and accepting of others.  He said he is also more accepting of self and this leads to less anger..    Intervention:   Behavior modification  Counselor feedback  Education  Emotional management  Group feedback    On 6/24   Counselor reviewed resources for some outside community support events and Acknowledged Thanh's efforts of continuing to work a program by getting a sponsor and being willing to talk and meet with him regularly.. On 26 counselor told Thanh he has good awareness of his warning signs, triggers and healthy coping.  Thanh received positive feedback regarding progress made in treatment from group members. Provided education on how feelings affect addiction and recovery.    Assessment:    Stages of Change Model  Action    Appears/Sounds:  Cooperative  Motivated  Engaged    Thanh engaged in group discussion about what he wants for his recovery and showing behaviors that support this.  Thanh provided feedback for his group members and is gving feedback more regularly.      Plan:   -Continue to attend 2-3 sober support group meetings each week (this includes non-12-step/AA alternative meetings).  -Client will continue to work on treatment plan " objectives.  -Client will attend all weekly Phase 2 group sessions.   -Client to engage in sober activities each week.   We will discuss more on spirituality.    Wk of 6/24: Warning signs and cues, sponsor.  6/17: The hole in the sidewalk, stages of drinking      Bethany Mijares, SERJIO, LPC, MS       Previously Declined (within the last year)

## 2023-08-13 NOTE — ED PROVIDER NOTE - NSFOLLOWUPCLINICS_GEN_ALL_ED_FT
[facility]  Neurology  [address] [place] [address]  Phone: [phone]  Fax: [phone]

## 2023-08-13 NOTE — ED ADULT NURSE NOTE - NSFALLUNIVINTERV_ED_ALL_ED
Bed/Stretcher in lowest position, wheels locked, appropriate side rails in place/Call bell, personal items and telephone in reach/Instruct patient to call for assistance before getting out of bed/chair/stretcher/Non-slip footwear applied when patient is off stretcher/[place] to call system/Physically safe environment - no spills, clutter or unnecessary equipment/Purposeful proactive rounding/Room/bathroom lighting operational, light cord in reach

## 2023-08-13 NOTE — ED PROVIDER NOTE - PATIENT PORTAL LINK FT
You can access the FollowMyHealth Patient Portal offered by [facility] by registering at the following website: [email or web address]. By joining [facility]’s FollowMyHealth portal, you will also be able to view your health information using other applications (apps) compatible with our system.

## 2023-08-13 NOTE — ED PROVIDER NOTE - IV ALTEPLASE INCLUSION HIDDEN
Moderate per cardiology  Outpatient cardio follow up Moderate per cardiology  Outpatient cardio follow up show

## 2023-08-13 NOTE — ED PROVIDER NOTE - CLINICAL SUMMARY MEDICAL DECISION MAKING FREE TEXT BOX
53-year-old female with past medical history of migraines coming in with left-sided headache for past 3 days.  Patient states this headache is similar to her past headaches.  Excedrin is not helping at home.  No fevers, chills, chest pain, shortness of breath, change in strength or sensation in extremities.  Patient was seen here for similar complaints 2 months ago and symptoms improved with Reglan and Toradol.  Patient is well-appearing.  No distress.  TM intact.  Equal strength and sensation in all extremities.  Smooth gait.  Differential diagnosis include headache.  No red flag signs or symptoms of headache.  Pain management.  Follow-up with neurologist.

## 2023-08-13 NOTE — ED ADULT TRIAGE NOTE - CHIEF COMPLAINT QUOTE
states suffer from migraine headache left side for like 3 months exedrin and rizatriptan not working

## 2023-08-13 NOTE — ED ADULT NURSE NOTE - CAS EDN DISCHARGE INTERVENTIONS
Please wear the splint until you are seen by orthopedics on Wednesday.  Apply ice to your arm.  Keep elevated.  Wear the sling.  I have sent pain medication for you to take.  Please take as directed.  No driving while taking the pain medication.  Take the medicine for nausea if you need it.  Make sure you take the pain medication with food.  Follow-up with the orthopedic doctor on Wednesday as we discussed.  Return to the emergency department with any new or worsening symptoms.  
IV discontinued, cath removed intact

## 2023-08-13 NOTE — ED PROVIDER NOTE - PROGRESS NOTE DETAILS
Dustin Sofia, DO:  Feels better. No new complaints  An opportunity to ask questions was provided and all questions answered. Discharge plan is discussed with the patient. Patient reports understanding to do the follow up with pcp. Return precautions discussed.

## 2023-08-14 NOTE — ED ADULT NURSE NOTE - DOES PATIENT HAVE ADVANCE DIRECTIVE
74 y/o female w/pmhx of HTN, chronic lymphedema with RLE wounds w/cellulitis.    A/P  Cellulitis of right lower extremity.    - blood cultures: NGTD  - ceftriaxone/clinda  - wound care consult Dr Marquez noted.   - surgical team has signed off.    Wheezing.  - nebs treatment.  - add low dose steroids.    Morbid obesity due to excess calories.    - nutrition consult.   - weight reduction.    Essential hypertension.    - monitor.     Preventive measure.    - sq lovenox.     PT eval was completed.     No

## 2023-10-04 ENCOUNTER — INPATIENT (INPATIENT)
Facility: HOSPITAL | Age: 53
LOS: 1 days | Discharge: HOME | DRG: 812 | End: 2023-10-06
Attending: STUDENT IN AN ORGANIZED HEALTH CARE EDUCATION/TRAINING PROGRAM | Admitting: STUDENT IN AN ORGANIZED HEALTH CARE EDUCATION/TRAINING PROGRAM
Payer: MEDICAID

## 2023-10-04 VITALS
TEMPERATURE: 99 F | SYSTOLIC BLOOD PRESSURE: 129 MMHG | HEART RATE: 100 BPM | OXYGEN SATURATION: 100 % | DIASTOLIC BLOOD PRESSURE: 75 MMHG | RESPIRATION RATE: 18 BRPM

## 2023-10-04 DIAGNOSIS — Z98.89 OTHER SPECIFIED POSTPROCEDURAL STATES: Chronic | ICD-10-CM

## 2023-10-04 DIAGNOSIS — G43.909 MIGRAINE, UNSPECIFIED, NOT INTRACTABLE, WITHOUT STATUS MIGRAINOSUS: ICD-10-CM

## 2023-10-04 DIAGNOSIS — R55 SYNCOPE AND COLLAPSE: ICD-10-CM

## 2023-10-04 DIAGNOSIS — D64.9 ANEMIA, UNSPECIFIED: ICD-10-CM

## 2023-10-04 DIAGNOSIS — Z29.9 ENCOUNTER FOR PROPHYLACTIC MEASURES, UNSPECIFIED: ICD-10-CM

## 2023-10-04 LAB
ALBUMIN SERPL ELPH-MCNC: 2.8 G/DL — LOW (ref 3.5–5)
ALP SERPL-CCNC: 66 U/L — SIGNIFICANT CHANGE UP (ref 40–120)
ALT FLD-CCNC: 25 U/L DA — SIGNIFICANT CHANGE UP (ref 10–60)
ANION GAP SERPL CALC-SCNC: 8 MMOL/L — SIGNIFICANT CHANGE UP (ref 5–17)
APPEARANCE UR: CLEAR — SIGNIFICANT CHANGE UP
AST SERPL-CCNC: 12 U/L — SIGNIFICANT CHANGE UP (ref 10–40)
BASOPHILS # BLD AUTO: 0.04 K/UL — SIGNIFICANT CHANGE UP (ref 0–0.2)
BASOPHILS NFR BLD AUTO: 0.4 % — SIGNIFICANT CHANGE UP (ref 0–2)
BILIRUB SERPL-MCNC: 0.2 MG/DL — SIGNIFICANT CHANGE UP (ref 0.2–1.2)
BILIRUB UR-MCNC: NEGATIVE — SIGNIFICANT CHANGE UP
BUN SERPL-MCNC: 40 MG/DL — HIGH (ref 7–18)
CALCIUM SERPL-MCNC: 8 MG/DL — LOW (ref 8.4–10.5)
CHLORIDE SERPL-SCNC: 109 MMOL/L — HIGH (ref 96–108)
CO2 SERPL-SCNC: 24 MMOL/L — SIGNIFICANT CHANGE UP (ref 22–31)
COLOR SPEC: YELLOW — SIGNIFICANT CHANGE UP
CREAT SERPL-MCNC: 0.58 MG/DL — SIGNIFICANT CHANGE UP (ref 0.5–1.3)
DIFF PNL FLD: NEGATIVE — SIGNIFICANT CHANGE UP
EGFR: 107 ML/MIN/1.73M2 — SIGNIFICANT CHANGE UP
EOSINOPHIL # BLD AUTO: 0.04 K/UL — SIGNIFICANT CHANGE UP (ref 0–0.5)
EOSINOPHIL NFR BLD AUTO: 0.4 % — SIGNIFICANT CHANGE UP (ref 0–6)
FERRITIN SERPL-MCNC: 5 NG/ML — LOW (ref 13–330)
GLUCOSE SERPL-MCNC: 111 MG/DL — HIGH (ref 70–99)
GLUCOSE UR QL: NEGATIVE MG/DL — SIGNIFICANT CHANGE UP
HCG SERPL-ACNC: <1 MIU/ML — SIGNIFICANT CHANGE UP
HCT VFR BLD CALC: 19.9 % — CRITICAL LOW (ref 34.5–45)
HCT VFR BLD CALC: 25.3 % — LOW (ref 34.5–45)
HGB BLD-MCNC: 6.1 G/DL — CRITICAL LOW (ref 11.5–15.5)
HGB BLD-MCNC: 8.2 G/DL — LOW (ref 11.5–15.5)
IMM GRANULOCYTES NFR BLD AUTO: 0.3 % — SIGNIFICANT CHANGE UP (ref 0–0.9)
IRON SATN MFR SERPL: 20 UG/DL — LOW (ref 40–150)
IRON SATN MFR SERPL: 5 % — LOW (ref 15–50)
KETONES UR-MCNC: 15 MG/DL
LACTATE SERPL-SCNC: 2.1 MMOL/L — HIGH (ref 0.7–2)
LEUKOCYTE ESTERASE UR-ACNC: NEGATIVE — SIGNIFICANT CHANGE UP
LYMPHOCYTES # BLD AUTO: 2.43 K/UL — SIGNIFICANT CHANGE UP (ref 1–3.3)
LYMPHOCYTES # BLD AUTO: 26.9 % — SIGNIFICANT CHANGE UP (ref 13–44)
MCHC RBC-ENTMCNC: 23 PG — LOW (ref 27–34)
MCHC RBC-ENTMCNC: 25 PG — LOW (ref 27–34)
MCHC RBC-ENTMCNC: 30.7 GM/DL — LOW (ref 32–36)
MCHC RBC-ENTMCNC: 32.4 GM/DL — SIGNIFICANT CHANGE UP (ref 32–36)
MCV RBC AUTO: 75.1 FL — LOW (ref 80–100)
MCV RBC AUTO: 77.1 FL — LOW (ref 80–100)
MONOCYTES # BLD AUTO: 0.62 K/UL — SIGNIFICANT CHANGE UP (ref 0–0.9)
MONOCYTES NFR BLD AUTO: 6.9 % — SIGNIFICANT CHANGE UP (ref 2–14)
NEUTROPHILS # BLD AUTO: 5.87 K/UL — SIGNIFICANT CHANGE UP (ref 1.8–7.4)
NEUTROPHILS NFR BLD AUTO: 65.1 % — SIGNIFICANT CHANGE UP (ref 43–77)
NITRITE UR-MCNC: NEGATIVE — SIGNIFICANT CHANGE UP
NRBC # BLD: 0 /100 WBCS — SIGNIFICANT CHANGE UP (ref 0–0)
NRBC # BLD: 0 /100 WBCS — SIGNIFICANT CHANGE UP (ref 0–0)
PH UR: 7 — SIGNIFICANT CHANGE UP (ref 5–8)
PLATELET # BLD AUTO: 233 K/UL — SIGNIFICANT CHANGE UP (ref 150–400)
PLATELET # BLD AUTO: 268 K/UL — SIGNIFICANT CHANGE UP (ref 150–400)
POTASSIUM SERPL-MCNC: 3.7 MMOL/L — SIGNIFICANT CHANGE UP (ref 3.5–5.3)
POTASSIUM SERPL-SCNC: 3.7 MMOL/L — SIGNIFICANT CHANGE UP (ref 3.5–5.3)
PROT SERPL-MCNC: 5.6 G/DL — LOW (ref 6–8.3)
PROT UR-MCNC: NEGATIVE MG/DL — SIGNIFICANT CHANGE UP
RBC # BLD: 2.65 M/UL — LOW (ref 3.8–5.2)
RBC # BLD: 3.28 M/UL — LOW (ref 3.8–5.2)
RBC # FLD: 14.9 % — HIGH (ref 10.3–14.5)
RBC # FLD: 15.4 % — HIGH (ref 10.3–14.5)
SODIUM SERPL-SCNC: 141 MMOL/L — SIGNIFICANT CHANGE UP (ref 135–145)
SP GR SPEC: 1.02 — SIGNIFICANT CHANGE UP (ref 1–1.03)
TIBC SERPL-MCNC: 419 UG/DL — SIGNIFICANT CHANGE UP (ref 250–450)
TROPONIN I, HIGH SENSITIVITY RESULT: 7 NG/L — SIGNIFICANT CHANGE UP
UIBC SERPL-MCNC: 399 UG/DL — HIGH (ref 110–370)
UROBILINOGEN FLD QL: 0.2 MG/DL — SIGNIFICANT CHANGE UP (ref 0.2–1)
WBC # BLD: 8.1 K/UL — SIGNIFICANT CHANGE UP (ref 3.8–10.5)
WBC # BLD: 9.03 K/UL — SIGNIFICANT CHANGE UP (ref 3.8–10.5)
WBC # FLD AUTO: 8.1 K/UL — SIGNIFICANT CHANGE UP (ref 3.8–10.5)
WBC # FLD AUTO: 9.03 K/UL — SIGNIFICANT CHANGE UP (ref 3.8–10.5)

## 2023-10-04 PROCEDURE — 99291 CRITICAL CARE FIRST HOUR: CPT | Mod: 25

## 2023-10-04 PROCEDURE — 99222 1ST HOSP IP/OBS MODERATE 55: CPT

## 2023-10-04 PROCEDURE — 70450 CT HEAD/BRAIN W/O DYE: CPT | Mod: 26,MA

## 2023-10-04 PROCEDURE — 99223 1ST HOSP IP/OBS HIGH 75: CPT

## 2023-10-04 PROCEDURE — 71045 X-RAY EXAM CHEST 1 VIEW: CPT | Mod: 26

## 2023-10-04 PROCEDURE — 95816 EEG AWAKE AND DROWSY: CPT | Mod: 26

## 2023-10-04 RX ORDER — DIPHENHYDRAMINE HCL 50 MG
25 CAPSULE ORAL ONCE
Refills: 0 | Status: DISCONTINUED | OUTPATIENT
Start: 2023-10-04 | End: 2023-10-06

## 2023-10-04 RX ORDER — SODIUM CHLORIDE 9 MG/ML
500 INJECTION INTRAMUSCULAR; INTRAVENOUS; SUBCUTANEOUS ONCE
Refills: 0 | Status: COMPLETED | OUTPATIENT
Start: 2023-10-04 | End: 2023-10-04

## 2023-10-04 RX ORDER — ACETAMINOPHEN 500 MG
1000 TABLET ORAL ONCE
Refills: 0 | Status: COMPLETED | OUTPATIENT
Start: 2023-10-04 | End: 2023-10-04

## 2023-10-04 RX ORDER — METOCLOPRAMIDE HCL 10 MG
10 TABLET ORAL ONCE
Refills: 0 | Status: DISCONTINUED | OUTPATIENT
Start: 2023-10-04 | End: 2023-10-06

## 2023-10-04 RX ORDER — METOCLOPRAMIDE HCL 10 MG
10 TABLET ORAL ONCE
Refills: 0 | Status: COMPLETED | OUTPATIENT
Start: 2023-10-04 | End: 2023-10-04

## 2023-10-04 RX ORDER — SODIUM CHLORIDE 9 MG/ML
1000 INJECTION INTRAMUSCULAR; INTRAVENOUS; SUBCUTANEOUS ONCE
Refills: 0 | Status: COMPLETED | OUTPATIENT
Start: 2023-10-04 | End: 2023-10-04

## 2023-10-04 RX ORDER — ACETAMINOPHEN 500 MG
1000 TABLET ORAL EVERY 8 HOURS
Refills: 0 | Status: COMPLETED | OUTPATIENT
Start: 2023-10-04 | End: 2023-10-04

## 2023-10-04 RX ORDER — ACETAMINOPHEN 500 MG
650 TABLET ORAL EVERY 6 HOURS
Refills: 0 | Status: DISCONTINUED | OUTPATIENT
Start: 2023-10-04 | End: 2023-10-05

## 2023-10-04 RX ORDER — PANTOPRAZOLE SODIUM 20 MG/1
40 TABLET, DELAYED RELEASE ORAL
Refills: 0 | Status: DISCONTINUED | OUTPATIENT
Start: 2023-10-04 | End: 2023-10-05

## 2023-10-04 RX ORDER — LEVETIRACETAM 250 MG/1
2000 TABLET, FILM COATED ORAL ONCE
Refills: 0 | Status: COMPLETED | OUTPATIENT
Start: 2023-10-04 | End: 2023-10-04

## 2023-10-04 RX ORDER — AMITRIPTYLINE HCL 25 MG
10 TABLET ORAL AT BEDTIME
Refills: 0 | Status: DISCONTINUED | OUTPATIENT
Start: 2023-10-04 | End: 2023-10-04

## 2023-10-04 RX ORDER — AMITRIPTYLINE HCL 25 MG
10 TABLET ORAL DAILY
Refills: 0 | Status: DISCONTINUED | OUTPATIENT
Start: 2023-10-04 | End: 2023-10-04

## 2023-10-04 RX ORDER — ONDANSETRON 8 MG/1
4 TABLET, FILM COATED ORAL EVERY 8 HOURS
Refills: 0 | Status: DISCONTINUED | OUTPATIENT
Start: 2023-10-04 | End: 2023-10-06

## 2023-10-04 RX ORDER — AMITRIPTYLINE HCL 25 MG
25 TABLET ORAL AT BEDTIME
Refills: 0 | Status: DISCONTINUED | OUTPATIENT
Start: 2023-10-04 | End: 2023-10-06

## 2023-10-04 RX ADMIN — Medication 1000 MILLIGRAM(S): at 05:36

## 2023-10-04 RX ADMIN — Medication 400 MILLIGRAM(S): at 23:01

## 2023-10-04 RX ADMIN — Medication 400 MILLIGRAM(S): at 05:36

## 2023-10-04 RX ADMIN — LEVETIRACETAM 600 MILLIGRAM(S): 250 TABLET, FILM COATED ORAL at 02:31

## 2023-10-04 RX ADMIN — Medication 10 MILLIGRAM(S): at 05:36

## 2023-10-04 RX ADMIN — PANTOPRAZOLE SODIUM 40 MILLIGRAM(S): 20 TABLET, DELAYED RELEASE ORAL at 13:57

## 2023-10-04 RX ADMIN — SODIUM CHLORIDE 1000 MILLILITER(S): 9 INJECTION INTRAMUSCULAR; INTRAVENOUS; SUBCUTANEOUS at 03:11

## 2023-10-04 RX ADMIN — SODIUM CHLORIDE 500 MILLILITER(S): 9 INJECTION INTRAMUSCULAR; INTRAVENOUS; SUBCUTANEOUS at 13:40

## 2023-10-04 RX ADMIN — SODIUM CHLORIDE 1000 MILLILITER(S): 9 INJECTION INTRAMUSCULAR; INTRAVENOUS; SUBCUTANEOUS at 16:53

## 2023-10-04 RX ADMIN — Medication 25 MILLIGRAM(S): at 22:59

## 2023-10-04 RX ADMIN — Medication 1 TABLET(S): at 13:57

## 2023-10-04 RX ADMIN — PANTOPRAZOLE SODIUM 40 MILLIGRAM(S): 20 TABLET, DELAYED RELEASE ORAL at 19:58

## 2023-10-04 NOTE — CONSULT NOTE ADULT - ASSESSMENT
53-year-old female with history of migraine headaches, anxiety anemia since having gastric bypass 11 months ago presents after episode of LOC, admitted for syncope workup, likely 2/2 symptomatic anemia, but cannot rule out seizure or syncope 2/2 arrhythmia.    #  Syncope.   ·  Plan: Likely 2/2 symptomatic anemia, but cannot rule out seizure entirely  EKG showing NSR  CTH neg  Hb 6    -orthostatic vitals  -telemetry  -TTE

## 2023-10-04 NOTE — H&P ADULT - HISTORY OF PRESENT ILLNESS
53-year-old female with history of migraine headaches, anemia, anxiety since having gastric bypass 11 months ago presents after episode of LOC.  Per the  patient was complaining of migraine headache earlier in the day when they went to [employer] and when they were leaving [employer] she felt weak and passed out he was able to hold onto her denies any head trauma at that time.  Reports that when she fainted he did notice some shaking activity and she urinated on self, says she woke up a few minutes later immediately at her baseline, and asked what was going on. Says that the same thing happened while they were in the ED.  Deny tongue bite in either instance.     Denies any chest pain, recent illness, bloody or black stools.  Patient denies any transfusions in the past for her anemia. Says last colonoscopy was 4 years ago, and says she recently had endoscopy as part of a procedure to evaluate for gallstones? 53-year-old female with history of migraine headaches, anxiety,  anemia since having gastric bypass 11 months ago presents after episode of LOC.  Per the  patient was complaining of migraine headache earlier in the day when they went to [employer] and when they were leaving [place] she felt weak and passed out he was able to hold onto her denies any head trauma at that time.  Reports that when she fainted he did notice some shaking activity and she urinated on self, says she woke up a few minutes later immediately at her baseline, and asked what was going on. Says that the same thing happened while they were in the ED.  Deny tongue bite in either instance.     Denies any chest pain, recent illness, bloody or black stools.  Patient denies any transfusions in the past for her anemia. Says last colonoscopy was 4 years ago, and says she recently had endoscopy as part of a procedure to evaluate for gallstones?

## 2023-10-04 NOTE — CONSULT NOTE ADULT - ASSESSMENT
53-year-old female with history of migraine headaches, anxiety,  anemia since having gastric bypass 11 months ago presents after episode of LOC.  Per the  patient was complaining of migraine headache earlier in the day when they went to [employer] and when they were leaving [employer] she felt weak and passed out he was able to hold onto her denies any head trauma at that time.  Reports that when she fainted he did notice some shaking activity and she urinated on self, says she woke up a few minutes later immediately at her baseline, and asked what was going on. Says that the same thing happened while they were in the ED.  Deny tongue bite in either instance.     Denies any chest pain, recent illness, bloody or black stools.  Patient denies any transfusions in the past for her anemia. Says last colonoscopy was 4 years ago, and says she recently had endoscopy as part of a procedure to evaluate for gallstones?    #VTE Prophylaxis   53-year-old female with history of migraine headaches, anxiety,  anemia since having gastric bypass 11 months ago presents after episode of LOC.  Per the  patient was complaining of migraine headache earlier in the day when they went to [employer] and when they were leaving [employer] she felt weak and passed out he was able to hold onto her denies any head trauma at that time.  Reports that when she fainted he did notice some shaking activity and she urinated on self, says she woke up a few minutes later immediately at her baseline, and asked what was going on. Says that the same thing happened while they were in the ED.  Deny tongue bite in either instance.     Denies any chest pain, recent illness, bloody or black stools.  Patient denies any transfusions in the past for her anemia. Says last colonoscopy was 4 years ago, and says she recently had endoscopy as part of a procedure to evaluate for gallstones?    #Microcytic Anemia  p/w syncope, symptomatic anemia  ongoing menses, +menorrhagia  eval by GYN as outpt, no medication, no fibroids as per pt  Last colonoscopy 4 years ago  Hx gastric bypass 11 months ago  Hgb=6.1 MCV=75  Hgb in 12/2021 was 11.1  Plt wnl  Cr nl  Albumin 2.8  Ferritin=5, Sat 5%, TIBC nl  Head CT (-)  EEG nl  Rec's:  etiology menorrhagia, gastric bypass, nutritional  -PRBC transfusion for Hgb <7.0  -Give venofer 200mg IV qd x 3 days (if pt ready for d/c prior to completion can continue as outpt)  -Daily CBC  -Nutrition consult, hypoalbuminemia  -GI consult appreciated, w/u as outpt  -Neuro consult pending  -pt can f/u with Dr. Thompson outpt for RONALD    #VTE Prophylaxis    Thank you for the referral. Will continue to monitor the patient.  Please call with any questions [phone]  Above reviewed with Attending Dr. Thompson  [facility]  [address] [facility], [address], [place], [place]  [phone]  *Note not finalized until signed by Attending Physician

## 2023-10-04 NOTE — H&P ADULT - NSHPPHYSICALEXAM_GEN_ALL_CORE
T(C): 37.1 (10-04-23 @ 00:51), Max: 37.1 (10-04-23 @ 00:51)  HR: 100 (10-04-23 @ 00:51) (100 - 100)  BP: 129/75 (10-04-23 @ 00:51) (129/75 - 129/75)  RR: 18 (10-04-23 @ 00:51) (18 - 18)  SpO2: 100% (10-04-23 @ 00:51) (100% - 100%)    CONSTITUTIONAL: Well groomed, no apparent distress, appears tired,     HEENT: normotramuatic, acephalic, PERRL and symmetric, EOMI, No conjunctival or scleral injection, non-icteric. Oral mucosa with moist membranes. No external nasal lesions; nasal mucosa not inflamed; no pharyngeal injection or exudates. somewhat pale mucous membranes, no tongue trauma               Neck: supple, symmetric and without tracheal deviation    RESPIRATORY: No respiratory distress, no use of accessory muscles; CTA b/l, no wheezes, rales or rhonchi    CARDIOVASCULAR: RRRR, +S1S2, no murmurs, no rubs, no gallops; no JVD; no peripheral edema  	Vascular: carotid pulse palpable, radial pulse palpable    GASTROINTESTINAL: +BS, Soft, non tender, non distended, no rebound, no guarding; No palpable masses    MUSCULOSKELETAL: No digital clubbing or cyanosis; examination of the (head/neck, spine/ribs/pelvis, RUE, LUE, RLE, LLE) without misalignment, no spinal tenderness    SKIN: No rashes or ulcers noted; no subcutaneous nodules or induration palpable    NEUROLOGIC: sensation intact in upper and lower extremities b/l to light touch; strength appropriate    PSYCHIATRIC: Appropriate insight/judgment; A+O x 3, mood and affect appropriate, recent/remote memory intact    LYMPHATIC: No cervical LAD or tenderness    GENITOURINARY: No suprapubic tenderness, no CVA tenderness

## 2023-10-04 NOTE — H&P ADULT - ATTENDING COMMENTS
HPI  53 year old female patient with pmhx migraine headaches, anxiety, anemia since having gastric bypass 11 months ago who presented to the ER due to loss of consciousness where she felt weak and passed out after leaving [employer] with some shaking activity and incontinence of urine but with quick return to baseline after the episode without a post-ictal phase. Patient states she recently had an endoscopy to remove gallstones. She denies any bleeding and denies black or bloody stool.    Physical Exam  General: Awake, Alert, Oriented  Cardiac: RRR  Pulmonary: CTA b/l  Abdominal: Soft ND NT  Extremities: No edema    A/P  # Syncope likely 2/2 Anemia  - Check Orthostatics  - ECHO  - Cardiac Telemetry Monitoring  - EEG    # Acute on Chronic Symptomatic Anemia  Patient states she has no black or bloody stool  - Transfuse if Hgb < 7  - Monitor Hgb  - Consider GI consult if Hgb does not improve or declines  - Follow up iron studies, ferritin; appears to be RONALD    # DVT PPx  - SCDs    # FEN  - Clear Liquid Diet  - Monitor and replete electrolytes as needed  - Receiving pRBC at bedside    Previous Admissions Included  8/13/2023: ER Visit for Migraine  6/6/2023: ER Visit for headache  12/24/2021: ER Visit for vaginal bleeding  5/21/2021: ER Visit for Migraine    Patient case was discussed with ER Attending  I did examine all labs and imaging including: CBC, Iron, TIBC, CMP, Lactate, Troponin, HCG, UA, CT Head, CXR

## 2023-10-04 NOTE — H&P ADULT - PROBLEM SELECTOR PLAN 3
pt reports taking excedrin, amitryptiline    -hold excedrin as NSAID iso anemia pt reports taking excedrin, amitryptiline, and receives botox injections    -hold excedrin as NSAID iso anemia  -consider sumatriptan instead? pt reports taking excedrin, amitryptiline, and receives botox injections    -hold excedrin as NSAID iso anemia  -consider stopping and TCA and start sumatriptan instead?

## 2023-10-04 NOTE — H&P ADULT - PROBLEM SELECTOR PLAN 2
Hb 6, pt does not know baseline  microcytic anemia  Iron studies showing low iron, TIBC 419, %sat5    -starting 1 u PRBC  -f/u ferritin  -start iron supplementaiton  -f/u repeat CBC post transfusion Hb 6, pt does not know baseline  microcytic anemia  Iron studies showing low iron, TIBC 419, %sat5  denying GIB, but given hx of bypass is at high risk for marginal ulcer (elevated BUN:Cr ratio)    -starting 1 u PRBC  -f/u ferritin  -start iron supplementaiton  -f/u repeat CBC post transfusion

## 2023-10-04 NOTE — CONSULT NOTE ADULT - SUBJECTIVE AND OBJECTIVE BOX
[  ] STAT REQUEST              [ X ] ROUTINE REQUEST    Patient is a 53 year old female with anemia. GI consulted to evaluate.         HPI:  53-year-old female with history of migraine headaches, anxiety,  anemia since having gastric bypass 11 months ago presents after episode of LOC.  Per the  patient was complaining of migraine headache earlier in the day when they went to [employer] and when they were leaving [employer] she felt weak and passed out he was able to hold onto her denies any head trauma at that time. Patient denies abdominal pain, nausea, vomiting, hematemesis, hematochezia, melena, fever, chills, chest pain, SOB, cough, hematuria, dysuria or diarrhea.        PAIN MANAGEMENT:  Pain Scale:                0 /10  Pain Location:        Prior Colonoscopy:  4 years ago      PAST MEDICAL HISTORY    HTN (hypertension)     Migraine    Anxiety disorder    COVID-19        PAST SURGICAL HISTORY     LASIK surgery        Allergies    No Known Allergies    Intolerances  None         MEDICATIONS  (STANDING):  amitriptyline 10 milliGRAM(s) Oral daily  multivitamin 1 Tablet(s) Oral daily  pantoprazole  Injectable 40 milliGRAM(s) IV Push two times a day    MEDICATIONS  (PRN):  acetaminophen     Tablet .. 650 milliGRAM(s) Oral every 6 hours PRN Temp greater or equal to 38C (100.4F), Mild Pain (1 - 3)  ondansetron Injectable 4 milliGRAM(s) IV Push every 8 hours PRN Nausea and/or Vomiting      SOCIAL HISTORY  Advanced Directives:       [ X ] Full Code       [  ] DNR  Marital Status:         [  ] M      [ X ] S      [  ] D       [  ] W  Children:       [ X ] Yes      [  ] No  Occupation:        [  ] Employed       [ X ] Unemployed       [  ] Retired  Diet:       [ X ] Regular       [  ] PEG feeding          [  ] NG tube feeding  Drug Use:           [ X ] Patient denied          [  ] Yes  Alcohol:           [ X ] No             [  ] Yes (socially)         [  ] Yes (chronic)  Tobacco:           [  ] Yes           [ X ] No      FAMILY HISTORY  [ X ] Heart Disease            [ X ] Diabetes             [ x ] HTN             [  ] Colon Cancer             [  ] Stomach Cancer              [  ] Pancreatic Cancer      VITAL SIGNS   Vital Signs Last 24 Hrs  T(C): 37.1 (04 Oct 2023 11:36), Max: 37.1 (04 Oct 2023 00:51)  T(F): 98.7 (04 Oct 2023 11:36), Max: 98.8 (04 Oct 2023 00:51)  HR: 89 (04 Oct 2023 11:36) (89 - 100)  BP: 101/59 (04 Oct 2023 11:36) (101/59 - 132/76)   RR: 15 (04 Oct 2023 11:36) (15 - 18)  SpO2: 98% (04 Oct 2023 11:36) (98% - 100%)  Parameters below as of 04 Oct 2023 11:36  Patient On (Oxygen Delivery Method): room air        CBC Full  -  ( 04 Oct 2023 01:54 )  WBC Count : 9.03 K/uL  RBC Count : 2.65 M/uL  Hemoglobin : 6.1 g/dL  Hematocrit : 19.9 %  Platelet Count - Automated : 268 K/uL  Mean Cell Volume : 75.1 fl  Mean Cell Hemoglobin : 23.0 pg  Mean Cell Hemoglobin Concentration : 30.7 gm/dL  Auto Neutrophil # : 5.87 K/uL  Auto Lymphocyte # : 2.43 K/uL  Auto Monocyte # : 0.62 K/uL  Auto Eosinophil # : 0.04 K/uL  Auto Basophil # : 0.04 K/uL  Auto Neutrophil % : 65.1 %  Auto Lymphocyte % : 26.9 %  Auto Monocyte % : 6.9 %  Auto Eosinophil % : 0.4 %  Auto Basophil % : 0.4 %      10-04    141  |  109<H>  |  40<H>  ----------------------------<  111<H>  3.7   |  24  |  0.58    Ca    8.0<L>      04 Oct 2023 01:54    TPro  5.6<L>  /  Alb  2.8<L>  /  TBili  0.2  /  DBili  x   /  AST  12  /  ALT  25  /  AlkPhos  66  10-04     Iron with Total Binding Capacity (10.04.23 @ 02:37)   Iron - Total Binding Capacity.: 419 ug/dL  % Saturation, Iron: 5 %  Iron Total: 20 ug/dL  Unsaturated Iron Binding Capacity: 399 ug/dLUrinalysis (10.04.23 @ 04:44)   Glucose Qualitative, Urine: Negative mg/dL  Blood, Urine: Negative  pH Urine: 7.0  Color: Yellow  Urine Appearance: Clear  Bilirubin: Negative  Ketone - Urine: 15 mg/dL  Specific Gravity: 1.017  Protein, Urine: Negative mg/dL  Urobilinogen: 0.2 mg/dL  Nitrite: Negative  Leukocyte Esterase Concentration: Negative      ECG (12.04.20 @ 14:55)     Ventricular Rate 71 BPM    Atrial Rate 71 BPM    P-R Interval 160 ms    QRS Duration 84 ms    Q-T Interval 372 ms    QTC Calculation(Bazett) 404 ms    P Axis 30 degrees    R Axis 69 degrees    T Axis 40 degrees    Diagnosis Line Normal sinus rhythm  Normal ECG         RADIOLOGY/IMAGING                  ACC: [id number] EXAM:  XR CHEST PORTABLE IMMED 1V   ORDERED BY: MAYUR VERAS     PROCEDURE DATE:  10/04/2023          INTERPRETATION:  CLINICAL INDICATION: 53 years  Female with syncope.    COMPARISON: 12/4/2020    AP view of the chest demonstrates the lungs to be clear. There is no   pleural effusion. The pulmonary vasculature is normal. There is no   pneumothorax.    The heart is normal in size. There is no mediastinal or hilar mass.    Mild thoracic degenerative changes and mild levoscoliosis are present.    IMPRESSION:    No acute infiltrate.

## 2023-10-04 NOTE — CONSULT NOTE ADULT - TIME BILLING
- Review of records, telemetry, vital signs and daily labs.   - General and cardiovascular physical examination.  - Generation of cardiovascular treatment plan.  - Coordination of care.      Patient was seen and examined by me on 10/4/23,interim events noted,labs and radiology studies reviewed.  Juan Jose Sahu MD,FACC.  [address] [place], [place] [id number].  [phone]

## 2023-10-04 NOTE — H&P ADULT - ASSESSMENT
53-year-old female with history of migraine headaches, anemia, anxiety since having gastric bypass 11 months ago presents after episode of LOC, admitted for syncope workup, likely 2/2 symptomatic anemia, but cannot rule out seizure or syncope 2/2 arrhythmia. 53-year-old female with history of migraine headaches, anemia, anxiety since having gastric bypass 11 months ago presents after episode of LOC, admitted for syncope workup, likely 2/2 symptomatic anemia, but cannot rule out seizure or syncope 2/2 arrhythmia.    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  53-year-old female with history of migraine headaches, anxiety anemia since having gastric bypass 11 months ago presents after episode of LOC, admitted for syncope workup, likely 2/2 symptomatic anemia, but cannot rule out seizure or syncope 2/2 arrhythmia.    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

## 2023-10-04 NOTE — CONSULT NOTE ADULT - NEGATIVE GENERAL GENITOURINARY SYMPTOMS
PAST SURGICAL HISTORY:  No significant past surgical history no hematuria/no renal colic/no flank pain L/no flank pain R

## 2023-10-04 NOTE — H&P ADULT - PROBLEM SELECTOR PLAN 1
Likely 2/2 symptomatic anemia, but cannot rule out seizure entirely  EKG showing NSR  CTH neg  Hb 6    -orthostatic vitals  -telemetry  -TTE  -cardio  ____  -out of abundance of caution, seizure workup with EEG  -neuro  _____ Likely 2/2 symptomatic anemia, but cannot rule out seizure entirely  EKG showing NSR  CTH neg  Hb 6    -orthostatic vitals  -telemetry  -TTE  -cardio Dr. Sahu  -out of abundance of caution, seizure workup with EEG  -neuro Dr. Lopez

## 2023-10-04 NOTE — CONSULT NOTE ADULT - ASSESSMENT
1. Iron deficiency anemia  2. No evidence of acute GI bleeding  3. R/o chronic GI bleeding  4. Constipation    Suggestions:    1. Monitor H/H  2. Transfuse PRBC as needed  3. Protonix daily  4. Avoid NSAID  5. Anemia work up  6. Iron supplement IV  7. Hematology evaluation  8. GYN evaluation  9. Check stool for occult blood  10. Further GI work up out patient  11. DVT prophylaxis

## 2023-10-04 NOTE — CONSULT NOTE ADULT - SUBJECTIVE AND OBJECTIVE BOX
Reason for Admission: symptomatic anemia  History of Present Illness:   53-year-old female with history of migraine headaches, anxiety,  anemia since having gastric bypass 11 months ago presents after episode of LOC.  Per the  patient was complaining of migraine headache earlier in the day when they went to [place] and when they were leaving [place] she felt weak and passed out he was able to hold onto her denies any head trauma at that time.  Reports that when she fainted he did notice some shaking activity and she urinated on self, says she woke up a few minutes later immediately at her baseline, and asked what was going on. Says that the same thing happened while they were in the ED.  Deny tongue bite in either instance.     Denies any chest pain, recent illness, bloody or black stools.  Patient denies any transfusions in the past for her anemia. Says last colonoscopy was 4 years ago, and says she recently had endoscopy as part of a procedure to evaluate for gallstones?        Allergies and Intolerances:        Allergies:  	No Known Allergies:     Home Medications:   * Patient Currently Takes Medications as of 04-Dec-2020 17:37 documented in Structured Notes  · 	Keflex 500 mg oral capsule: 1 cap(s) orally every 12 hours   · 	 mg oral tablet: 1 tab(s) orally every 6 hours, As Needed pain/fever with food  · 	Bactrim  mg-160 mg oral tablet: 1 tab(s) orally 2 times a day  · 	Fioricet oral capsule: 1 cap(s) orally every 4 hours - for moderate pain  · 	 mg oral tablet: 1 tab(s) orally every 8 hours PRN headache    Patient History:    Social History:  · Substance use	No     Tobacco Screening:  · Core Measure Site	Yes  · Has the patient used tobacco in the past 30 days?	No      MEDICATIONS  (STANDING):  amitriptyline 10 milliGRAM(s) Oral daily  metoclopramide Injectable 10 milliGRAM(s) IV Push once  multivitamin 1 Tablet(s) Oral daily  pantoprazole  Injectable 40 milliGRAM(s) IV Push two times a day    MEDICATIONS  (PRN):  acetaminophen     Tablet .. 650 milliGRAM(s) Oral every 6 hours PRN Temp greater or equal to 38C (100.4F), Mild Pain (1 - 3)  ondansetron Injectable 4 milliGRAM(s) IV Push every 8 hours PRN Nausea and/or Vomiting    CAPILLARY BLOOD GLUCOSE      POCT Blood Glucose.: 139 mg/dL (04 Oct 2023 02:15)  POCT Blood Glucose.: 118 mg/dL (04 Oct 2023 01:16)    I&O's Summary      PHYSICAL EXAM:  Vital Signs Last 24 Hrs  T(C): 37.1 (04 Oct 2023 11:36), Max: 37.1 (04 Oct 2023 00:51)  T(F): 98.7 (04 Oct 2023 11:36), Max: 98.8 (04 Oct 2023 00:51)  HR: 89 (04 Oct 2023 11:36) (89 - 100)  BP: 101/59 (04 Oct 2023 11:36) (101/59 - 132/76)  BP(mean): --  RR: 15 (04 Oct 2023 11:36) (15 - 18)  SpO2: 98% (04 Oct 2023 11:36) (98% - 100%)    Parameters below as of 04 Oct 2023 11:36  Patient On (Oxygen Delivery Method): room air          LABS:                        6.1    9.03  )-----------( 268      ( 04 Oct 2023 01:54 )             19.9     10-04    141  |  109<H>  |  40<H>  ----------------------------<  111<H>  3.7   |  24  |  0.58    Ca    8.0<L>      04 Oct 2023 01:54    TPro  5.6<L>  /  Alb  2.8<L>  /  TBili  0.2  /  DBili  x   /  AST  12  /  ALT  25  /  AlkPhos  66  10-04          Urinalysis Basic - ( 04 Oct 2023 04:44 )    Color: Yellow / Appearance: Clear / S.017 / pH: x  Gluc: x / Ketone: 15 mg/dL  / Bili: Negative / Urobili: 0.2 mg/dL   Blood: x / Protein: Negative mg/dL / Nitrite: Negative   Leuk Esterase: Negative / RBC: x / WBC x   Sq Epi: x / Non Sq Epi: x / Bacteria: x            RADIOLOGY & ADDITIONAL TESTS:  < from: CT Head No Cont (10.04.23 @ 02:27) >    ACC: [id number] EXAM:  CT BRAIN   ORDERED BY: MAYUR VERAS     PROCEDURE DATE:  10/04/2023          INTERPRETATION:  CLINICAL HISTORY:  Syncope.    TECHNIQUE:  CT of the head without contrast.  Contiguous transaxial images of the head were acquired from the skull   base to the vertex without the administration of iodinated contrast.   Coronal and sagittal reformatted images are provided.    COMPARISON: CT head 2021    FINDINGS:    There is no acute intracranial hemorrhage, vasogenic edemaor evidence   for acute large vascular territory infarct.    The ventricles, sulci and cisternal spaces are stable and unremarkable in   size and configuration.  There is no midline shift or abnormal   extra-axial fluid collection.    The calvarium is intact.  There are no osteoblastic or lytic calvarial or   skull base lesions.  The paranasal sinuses and mastoid air cells are   clear.    IMPRESSION:  Stable exam. No acute intracranial finding.    < end of copied text >  < from: Xray Chest 1 View-PORTABLE IMMEDIATE (Xray Chest 1 View-PORTABLE IMMEDIATE .) (10.04.23 @ 04:28) >  IMPRESSION:    No acute infiltrate.    < end of copied text >  Normal EEG study.  No epileptiform pattern or seizure seen.     Reason for Admission: symptomatic anemia  History of Present Illness:   53-year-old female with history of migraine headaches, anxiety,  anemia since having gastric bypass 11 months ago presents after episode of LOC.  Per the  patient was complaining of migraine headache earlier in the day when they went to [place] and when they were leaving [place] she felt weak and passed out he was able to hold onto her denies any head trauma at that time.  Reports that when she fainted he did notice some shaking activity and she urinated on self, says she woke up a few minutes later immediately at her baseline, and asked what was going on. Says that the same thing happened while they were in the ED.  Deny tongue bite in either instance.     Denies any chest pain, recent illness, bloody or black stools.  Patient denies any transfusions in the past for her anemia. Says last colonoscopy was 4 years ago, and says she recently had endoscopy as part of a procedure to evaluate for gallstones?    #[id number]    REVIEW OF SYSTEMS:    CONSTITUTIONAL: No fever, no loss of appetite. no chills, no weight loss   EYES: no acute visual disturbances  NECK: No pain or stiffness  RESPIRATORY: No cough; No shortness of breath  CARDIOVASCULAR: No chest pain, no palpitations  GASTROINTESTINAL: No pain. No nausea or vomiting; No diarrhea   NEUROLOGICAL: No headache or numbness, no tremors  MUSCULOSKELETAL: No joint pain, no muscle pain  GENITOURINARY: not menopausal, ongoing menses, LMP 2023 with significant +menorrhagia (pt has menses for two week period and uses a diaper) no dysuria, no frequency, no hesitancy  PSYCHIATRY: no depression, no anxiety  ALL OTHER  ROS negative      Allergies and Intolerances:        Allergies:  	No Known Allergies:     Home Medications:   * Patient Currently Takes Medications as of 04-Dec-2020 17:37 documented in Structured Notes  · 	Keflex 500 mg oral capsule: 1 cap(s) orally every 12 hours   · 	 mg oral tablet: 1 tab(s) orally every 6 hours, As Needed pain/fever with food  · 	Bactrim  mg-160 mg oral tablet: 1 tab(s) orally 2 times a day  · 	Fioricet oral capsule: 1 cap(s) orally every 4 hours - for moderate pain  · 	 mg oral tablet: 1 tab(s) orally every 8 hours PRN headache    Patient History:    Social History:  · Substance use	No     Tobacco Screening:  · Core Measure Site	Yes  · Has the patient used tobacco in the past 30 days?	No      MEDICATIONS  (STANDING):  amitriptyline 10 milliGRAM(s) Oral daily  metoclopramide Injectable 10 milliGRAM(s) IV Push once  multivitamin 1 Tablet(s) Oral daily  pantoprazole  Injectable 40 milliGRAM(s) IV Push two times a day    MEDICATIONS  (PRN):  acetaminophen     Tablet .. 650 milliGRAM(s) Oral every 6 hours PRN Temp greater or equal to 38C (100.4F), Mild Pain (1 - 3)  ondansetron Injectable 4 milliGRAM(s) IV Push every 8 hours PRN Nausea and/or Vomiting    CAPILLARY BLOOD GLUCOSE      POCT Blood Glucose.: 139 mg/dL (04 Oct 2023 02:15)  POCT Blood Glucose.: 118 mg/dL (04 Oct 2023 01:16)    I&O's Summary      PHYSICAL EXAM:  Vital Signs Last 24 Hrs  T(C): 37.1 (04 Oct 2023 11:36), Max: 37.1 (04 Oct 2023 00:51)  T(F): 98.7 (04 Oct 2023 11:36), Max: 98.8 (04 Oct 2023 00:51)  HR: 89 (04 Oct 2023 11:36) (89 - 100)  BP: 101/59 (04 Oct 2023 11:36) (101/59 - 132/76)  BP(mean): --  RR: 15 (04 Oct 2023 11:36) (15 - 18)  SpO2: 98% (04 Oct 2023 11:36) (98% - 100%)    Parameters below as of 04 Oct 2023 11:36  Patient On (Oxygen Delivery Method): room air    GEN: NAD; A and O x 3, pallor  LUNGS: CTA B/L  HEART: S1 S2  ABDOMEN: soft, non-tender, non-distended, + BS  EXTREMITIES: no edema        LABS:                        6.1    9.03  )-----------( 268      ( 04 Oct 2023 01:54 )             19.9     10-04    141  |  109<H>  |  40<H>  ----------------------------<  111<H>  3.7   |  24  |  0.58    Ca    8.0<L>      04 Oct 2023 01:54    TPro  5.6<L>  /  Alb  2.8<L>  /  TBili  0.2  /  DBili  x   /  AST  12  /  ALT  25  /  AlkPhos  66  10-04          Urinalysis Basic - ( 04 Oct 2023 04:44 )    Color: Yellow / Appearance: Clear / S.017 / pH: x  Gluc: x / Ketone: 15 mg/dL  / Bili: Negative / Urobili: 0.2 mg/dL   Blood: x / Protein: Negative mg/dL / Nitrite: Negative   Leuk Esterase: Negative / RBC: x / WBC x   Sq Epi: x / Non Sq Epi: x / Bacteria: x            RADIOLOGY & ADDITIONAL TESTS:  < from: CT Head No Cont (10.04.23 @ 02:27) >    ACC: [id number] EXAM:  CT BRAIN   ORDERED BY: MAYUR VERAS     PROCEDURE DATE:  10/04/2023          INTERPRETATION:  CLINICAL HISTORY:  Syncope.    TECHNIQUE:  CT of the head without contrast.  Contiguous transaxial images of the head were acquired from the skull   base to the vertex without the administration of iodinated contrast.   Coronal and sagittal reformatted images are provided.    COMPARISON: CT head 2021    FINDINGS:    There is no acute intracranial hemorrhage, vasogenic edemaor evidence   for acute large vascular territory infarct.    The ventricles, sulci and cisternal spaces are stable and unremarkable in   size and configuration.  There is no midline shift or abnormal   extra-axial fluid collection.    The calvarium is intact.  There are no osteoblastic or lytic calvarial or   skull base lesions.  The paranasal sinuses and mastoid air cells are   clear.    IMPRESSION:  Stable exam. No acute intracranial finding.    < end of copied text >  < from: Xray Chest 1 View-PORTABLE IMMEDIATE (Xray Chest 1 View-PORTABLE IMMEDIATE .) (10.04.23 @ 04:28) >  IMPRESSION:    No acute infiltrate.    < end of copied text >  Normal EEG study.  No epileptiform pattern or seizure seen.

## 2023-10-04 NOTE — CONSULT NOTE ADULT - SUBJECTIVE AND OBJECTIVE BOX
NEUROLOGY CONSULT NOTE    NAME:  ALEXIS LICEA      ASSESSMENT:        RECOMMENDATIONS:        NOTE TO BE COMPLETED - PLEASE REFER TO ABOVE ONLY AND IGNORE INFORMATION BELOW    *******************************      CHIEF COMPLAINT:  Patient is a 53y old  Female who presents with a chief complaint of symptomatic anemia (04 Oct 2023 06:58)      HPI:  53-year-old female with history of migraine headaches, anxiety,  anemia since having gastric bypass 11 months ago presents after episode of LOC.  Per the  patient was complaining of migraine headache earlier in the day when they went to [employer] and when they were leaving [employer] she felt weak and passed out he was able to hold onto her denies any head trauma at that time.  Reports that when she fainted he did notice some shaking activity and she urinated on self, says she woke up a few minutes later immediately at her baseline, and asked what was going on. Says that the same thing happened while they were in the ED.  Deny tongue bite in either instance.     Denies any chest pain, recent illness, bloody or black stools.  Patient denies any transfusions in the past for her anemia. Says last colonoscopy was 4 years ago, and says she recently had endoscopy as part of a procedure to evaluate for gallstones? (04 Oct 2023 06:58)      NEURO HPI:      PAST MEDICAL & SURGICAL HISTORY:  HTN (hypertension)      Migraine      Anxiety      COVID-19  4/2020      S/P LASIK surgery          MEDICATIONS:  acetaminophen     Tablet .. 650 milliGRAM(s) Oral every 6 hours PRN  amitriptyline 10 milliGRAM(s) Oral daily  multivitamin 1 Tablet(s) Oral daily  ondansetron Injectable 4 milliGRAM(s) IV Push every 8 hours PRN  pantoprazole  Injectable 40 milliGRAM(s) IV Push two times a day      ALLERGIES:  No Known Allergies      FAMILY HISTORY:        SOCIAL HISTORY:  Denies alcohol, tobacco, or illicit drug use      REVIEW OF SYSTEMS:  GENERAL: No fever, weight changes, fatigue  EYES: No eye pain or discharge  EAR/NOSE/MOUTH/THROAT: No sinus or throat pain; No difficulty hearing  NECK: No pain or stiffness  RESPIRATORY: No cough, wheezing, chills, or hemoptysis  CARDIOVASCULAR: No chest pain, palpitations, shortness of breath, or dyspnea on exertion  GASTROINTESTINAL: No abdominal pain, nausea, vomiting, hematemesis, diarrhea, or constipation  GENITOURINARY: No dysuria, frequency, hematuria, or incontinence  SKIN: No rashes or lesions  ENDOCRINE: No heat or cold intolerance  HEMATOLOGIC: No easy bruising or bleeding  PSYCHIATRIC: No depression, anxiety, or mood swings  MUSCULOSKELETAL: No joint pain or swelling  NEUROLOGICAL: As per HPI          OBJECTIVE:    Vital Signs Last 24 Hrs  T(C): 36.8 (04 Oct 2023 07:28), Max: 37.1 (04 Oct 2023 00:51)  T(F): 98.2 (04 Oct 2023 07:28), Max: 98.8 (04 Oct 2023 00:51)  HR: 99 (04 Oct 2023 07:28) (89 - 100)  BP: 107/73 (04 Oct 2023 07:28) (107/73 - 132/76)  BP(mean): --  RR: 15 (04 Oct 2023 07:28) (15 - 18)  SpO2: 100% (04 Oct 2023 07:28) (100% - 100%)    Parameters below as of 04 Oct 2023 07:28  Patient On (Oxygen Delivery Method): room air        General Examination:  General: No acute distress  HEENT: Atraumatic, Normocephalic  Respiratory: CTA B/l.  No crackles, rhonchi, or wheezes.  Cardiovascular: RRR.  Normal S1 & S2.  Normal b/l radial and pedal pulses.    Neurological Examination:  General / Mental Status: AAO x 3.  No aphasia or dysarthria.  Naming and repetition intact.  Cranial Nerves: VFF x 4.  PERRL.  EOMI x 2, No nystagmus or diplopia.  B/l V1-V3 equal and intact to light touch and pinprick.  Symmetric facial movement and palate elevation.  B/l hearing equal to finger rub.  5/5 strength with b/l sternocleidomastoid & trapezius.  Midline tongue protrusion, with no atrophy or fasciculations.  Motor: Normal bulk & tone in all four extremities.  5/5 strength throughout all four extremities.  No downward drift, rigidity, spasticity, or tremors in any of the four extremities.  Sensory: Intact to light touch and pinprick in all four extremities.  Negative Romberg.  Reflex: 2+ and symmetric at b/l biceps, triceps, brachioradialis, patellae, and ankles.  Downgoing toes b/l.  Coordination: No dysmetria with b/l finger-to-nose and heel raise tests.  Symmetric rapid alternating movements b/l.  Gait: Normal, narrow-based gait.  No difficulty with tiptoe, heel, and tandem gaits.        LABORATORY VALUES:                        6.1    9.03  )-----------( 268      ( 04 Oct 2023 01:54 )             19.9       10-04    141  |  109<H>  |  40<H>  ----------------------------<  111<H>  3.7   |  24  |  0.58    Ca    8.0<L>      04 Oct 2023 01:54    TPro  5.6<L>  /  Alb  2.8<L>  /  TBili  0.2  /  DBili  x   /  AST  12  /  ALT  25  /  AlkPhos  66  10-04    LIVER FUNCTIONS - ( 04 Oct 2023 01:54 )  Alb: 2.8 g/dL / Pro: 5.6 g/dL / ALK PHOS: 66 U/L / ALT: 25 U/L DA / AST: 12 U/L / GGT: x                                 NEUROIMAGING:          Please contact the Neurology consult service with any neurological questions.    Leobardo Lopez MD   of Neurology  [facility] at [facility] NEUROLOGY CONSULT NOTE    NAME:  ALEXIS LICEA      ASSESSMENT:  53F from home, ambulates independently, with PMHx Migraines, Anxiety, and Anemia and PSHx gastric bypass 11 months ago, presenting to the hospital after having a witnessed syncopal episodes with reported convulsions and urinary incontinence as witnessed by her . In the ED found to be hypotensive and anemic Hgb 6.1 with orthostasis requiring blood transfusions. CTH was unremarkable. EEG unremarkable in all phases making seizure less likely. Admitted to medicine for syncope work up and symptomatic anemia. Neurology consulted for seizure r/o and migraine management.       RECOMMENDATIONS:  1) Increase Amitriptyline 25mg, current home dose is low dose and is not yet therapeutic. Monitor for side effects while inpatient.    2) Start Tylenol 1G IVP TID PRN to aid in migraine control while inpatient. Can consider adding Excedrin if not non-formulary. MAX 4G/day of acetaminophen.   3) Continue with volume repletion in the setting of significant anemia and orthostasis.   4) Obtain records from Neurologist, Dr. Karol Rubin in [place] to better optimize migraine management for suspected complex migraine.   5) No further head imaging needed given normal EEG, history, and presentation lower concern for seizures at this time.  6) In agreement with GI consult for anemia work up.         NOTE TO BE COMPLETED - PLEASE AWAIT ATTENDING ATTESTATION     *******************************      CHIEF COMPLAINT:  Patient is a 53y old  Female who presents with a chief complaint of symptomatic anemia (04 Oct 2023 06:58)      HPI:  53-year-old female with history of migraine headaches, anxiety,  anemia since having gastric bypass 11 months ago presents after episode of LOC.  Per the  patient was complaining of migraine headache earlier in the day when they went to [employer] and when they were leaving [employer] she felt weak and passed out he was able to hold onto her denies any head trauma at that time.  Reports that when she fainted he did notice some shaking activity and she urinated on self, says she woke up a few minutes later immediately at her baseline, and asked what was going on. Says that the same thing happened while they were in the ED.  Deny tongue bite in either instance.     Denies any chest pain, recent illness, bloody or black stools.  Patient denies any transfusions in the past for her anemia. Says last colonoscopy was 4 years ago, and says she recently had endoscopy as part of a procedure to evaluate for gallstones? (04 Oct 2023 06:58)      NEURO HPI:  53F from home, ambulates independently, with PMHx Migraines, Anxiety, and Anemia and PSHx gastric bypass 11 months ago, presenting to the hospital after having a witnessed syncopal episodes with reported convulsions and urinary incontinence as witnessed by her . No reported head trauma. Patient reports weakness with fatigue that has been affecting her for weeks, yesterday there was no change in the persistent symptoms. However prior to syncopal episodes she reports dizziness which was new for her. Also reports unremitting persistent headaches which she attributes to her poorly controlled migraines, denies change in intensity prior to event. Patient denies any prior occurrence. Denies fevers or recent illness. Although denies chills  states that she has tremor-like shakes prior to her syncopsizing. In the ED patient BP was soft and has been noted to downtrend 101/59 >>98/61 with HR 90-100s. Labs significant for anemia Hgb 6.1, Iron 20, Iron sat 5%, and Ferritin 5 with an elevated Lactate 2.1 and elevated BUN/Cr ratio. No overt signes of bleeding on physical exam. Patient reports recurrent syncopal episodes since admission with similar presentation of tremors prior and persistent weakness. 2u pRBC ordered, one given at the time of assessment. CTH was unremarkable. Given 2G keppra, Reglan 10mg, and 1L in the ED for seizure precautions.     Outpatient work up for Migraines:  Describes having migraines ever since she can remember with no relief. Describes persistent unremitting migraines with associated mild light and photo sensitivity and nausea but no vomiting. Denies dizziness or gait imbalances.   Follows Karol Rubin MD with [facility] in [place]. Reports extensive failed outpatient course of triptans and Topiramate with discontinuation for ineffectiveness or side effects. Also reports failed relief after botox injections most recently last week. Reports no relief with current regiment: Amitriptyline taking for 1-month and Excedrin PRN, recently started on new nasal spray Zavegepant 10mg PRN with no relief.       PAST MEDICAL & SURGICAL HISTORY:  HTN (hypertension)      Migraine      Anxiety      COVID-19  4/2020      S/P LASIK surgery          MEDICATIONS:  acetaminophen     Tablet .. 650 milliGRAM(s) Oral every 6 hours PRN  amitriptyline 10 milliGRAM(s) Oral daily  multivitamin 1 Tablet(s) Oral daily  ondansetron Injectable 4 milliGRAM(s) IV Push every 8 hours PRN  pantoprazole  Injectable 40 milliGRAM(s) IV Push two times a day      ALLERGIES:  No Known Allergies      FAMILY HISTORY:        SOCIAL HISTORY:  Denies alcohol, tobacco, or illicit drug use      REVIEW OF SYSTEMS:  GENERAL: No fever, weight changes. (+) fatigue  EYES: No eye pain or discharge  EAR/NOSE/MOUTH/THROAT: No sinus or throat pain; No difficulty hearing  NECK: No pain or stiffness  RESPIRATORY: No cough, wheezing, chills, or hemoptysis  CARDIOVASCULAR: No chest pain, palpitations, shortness of breath, or dyspnea on exertion  GASTROINTESTINAL: No abdominal pain, nausea, vomiting, hematemesis, diarrhea, or constipation  GENITOURINARY: No dysuria, frequency, hematuria, or incontinence  SKIN: No rashes or lesions  ENDOCRINE: No heat or cold intolerance  HEMATOLOGIC: No easy bruising or bleeding  PSYCHIATRIC: No depression, anxiety, or mood swings  MUSCULOSKELETAL: No joint pain or swelling  NEUROLOGICAL: As per HPI          OBJECTIVE:    Vital Signs Last 24 Hrs  T(C): 36.8 (04 Oct 2023 07:28), Max: 37.1 (04 Oct 2023 00:51)  T(F): 98.2 (04 Oct 2023 07:28), Max: 98.8 (04 Oct 2023 00:51)  HR: 99 (04 Oct 2023 07:28) (89 - 100)  BP: 107/73 (04 Oct 2023 07:28) (107/73 - 132/76)  BP(mean): --  RR: 15 (04 Oct 2023 07:28) (15 - 18)  SpO2: 100% (04 Oct 2023 07:28) (100% - 100%)    Parameters below as of 04 Oct 2023 07:28  Patient On (Oxygen Delivery Method): room air        General Examination:  General: No acute distress  HEENT: Atraumatic, Normocephalic  Respiratory: CTA B/l.  No crackles, rhonchi, or wheezes.  Cardiovascular: RRR.  Normal S1 & S2.  Normal b/l radial and pedal pulses.    Neurological Examination:  General / Mental Status: AAO x 3.  No aphasia or dysarthria.  Naming and repetition intact.  Cranial Nerves: VFF x 4.  PERRL.  EOMI x 2, No nystagmus or diplopia.  B/l V1-V3 equal and intact to light touch and pinprick.  Symmetric facial movement and palate elevation.  B/l hearing equal to finger rub.  5/5 strength with b/l sternocleidomastoid & trapezius.  Midline tongue protrusion, with no atrophy or fasciculations.  Motor: Normal bulk & tone in all four extremities.  5/5 strength throughout all four extremities.  No downward drift, rigidity, spasticity, or tremors in any of the four extremities.  Sensory: Intact to light touch and pinprick in all four extremities.  Negative Romberg.  Reflex: 2+ and symmetric at b/l biceps, triceps, brachioradialis, patellae, and ankles.  Downgoing toes b/l.  Coordination: No dysmetria with b/l finger-to-nose and heel raise tests.  Symmetric rapid alternating movements b/l.  Gait: Normal, narrow-based gait.  No difficulty with tiptoe, heel, and tandem gaits.        LABORATORY VALUES:                        6.1    9.03  )-----------( 268      ( 04 Oct 2023 01:54 )             19.9       10-04    141  |  109<H>  |  40<H>  ----------------------------<  111<H>  3.7   |  24  |  0.58    Ca    8.0<L>      04 Oct 2023 01:54    TPro  5.6<L>  /  Alb  2.8<L>  /  TBili  0.2  /  DBili  x   /  AST  12  /  ALT  25  /  AlkPhos  66  10-04    LIVER FUNCTIONS - ( 04 Oct 2023 01:54 )  Alb: 2.8 g/dL / Pro: 5.6 g/dL / ALK PHOS: 66 U/L / ALT: 25 U/L DA / AST: 12 U/L / GGT: x                                 NEUROIMAGING:  ______________________________________________________________________  < from: CT Head No Cont (10.04.23 @ 02:27) >  FINDINGS:    There is no acute intracranial hemorrhage, vasogenic edemaor evidence   for acute large vascular territory infarct.    The ventricles, sulci and cisternal spaces are stable and unremarkable in   size and configuration.  There is no midline shift or abnormal   extra-axial fluid collection.    The calvarium is intact.  There are no osteoblastic or lytic calvarial or   skull base lesions.  The paranasal sinuses and mastoid air cells are   clear.    IMPRESSION:  Stable exam. No acute intracranial finding.    ______________________________________________________________________  EEG STUDY RESULTS 10/4/23:  STUDY INTERPRETATION    Findings: The background was continuous, spontaneously variable and reactive. During wakefulness, the posterior dominant rhythm consisted of symmetric, well-modulated 9Hz activity, with amplitude to 30 uV, that attenuated to eye opening.  Low amplitude frontal beta was noted in wakefulness.    Background Slowing:  No generalized background slowing was present.    Focal Slowing:   None were present.    Sleep Background:  Drowsiness was characterized by fragmentation, attenuation, and slowing of the background activity.    Sleep was characterized by the presence of vertex waves, symmetric sleep spindles and K-complexes.    Other Non-Epileptiform Findings:  None were present.    Interictal Epileptiform Activity:   None were present.    Events:  Clinical events: None recorded.  Seizures: None recorded.    Activation Procedures:   Hyperventilation was not performed.    Photic stimulation was not performed.     Artifacts:  Intermittent myogenic and movement artifacts were noted.    _____________________________________________________________  EEG SUMMARY/CLASSIFICATION    Normal EEG in the awake, drowsy and asleep states.  _____________________________________________________________  EEG IMPRESSION/CLINICAL CORRELATE    Normal EEG study.  No epileptiform pattern or seizure seen.              Please contact the Neurology consult service with any neurological questions.    Leobardo Lopez MD   of Neurology  [facility] at [facility] NEUROLOGY CONSULT NOTE    NAME:  ALEXIS LICEA      ASSESSMENT:  53F from home, ambulates independently, with PMHx Migraines, Anxiety, and Anemia and PSHx gastric bypass 11 months ago, presenting to the hospital after having a witnessed syncopal episodes with reported convulsions and urinary incontinence as witnessed by her . In the ED found to be hypotensive and anemic Hgb 6.1 with orthostasis requiring blood transfusions. CTH was unremarkable. EEG unremarkable in all phases making seizure less likely. Admitted to medicine for syncope work up and symptomatic anemia. Neurology consulted for seizure r/o and migraine management.       RECOMMENDATIONS:  1) Increase Amitriptyline 25mg, current home dose is low dose and is not yet therapeutic. Monitor for side effects while inpatient.    2) Start Tylenol 1G IVP TID PRN to aid in migraine control while inpatient. Can consider adding Excedrin if not non-formulary. MAX 4G/day of acetaminophen.   3) Continue with volume repletion in the setting of significant anemia and orthostasis.   4) Obtain records from Neurologist, Dr. Karol Rubin in [place] to better optimize migraine management for suspected complex migraine.   5) No further head imaging needed given normal EEG, history, and presentation lower concern for seizures at this time.  6) In agreement with GI consult for anemia work up.         Plan discussed with Neurology Attending, Dr. Lopez       CHIEF COMPLAINT:  Patient is a 53y old  Female who presents with a chief complaint of symptomatic anemia (04 Oct 2023 06:58)      HPI:  53-year-old female with history of migraine headaches, anxiety,  anemia since having gastric bypass 11 months ago presents after episode of LOC.  Per the  patient was complaining of migraine headache earlier in the day when they went to [employer] and when they were leaving [employer] she felt weak and passed out he was able to hold onto her denies any head trauma at that time.  Reports that when she fainted he did notice some shaking activity and she urinated on self, says she woke up a few minutes later immediately at her baseline, and asked what was going on. Says that the same thing happened while they were in the ED.  Deny tongue bite in either instance.     Denies any chest pain, recent illness, bloody or black stools.  Patient denies any transfusions in the past for her anemia. Says last colonoscopy was 4 years ago, and says she recently had endoscopy as part of a procedure to evaluate for gallstones? (04 Oct 2023 06:58)      NEURO HPI:  53F from home, ambulates independently, with PMHx Migraines, Anxiety, and Anemia and PSHx gastric bypass 11 months ago, presenting to the hospital after having a witnessed syncopal episodes with reported convulsions and urinary incontinence as witnessed by her . No reported head trauma. Patient reports weakness with fatigue that has been affecting her for weeks, yesterday there was no change in the persistent symptoms. However prior to syncopal episodes she reports dizziness which was new for her. Also reports unremitting persistent headaches which she attributes to her poorly controlled migraines, denies change in intensity prior to event. Patient denies any prior occurrence. Denies fevers or recent illness. Although denies chills  states that she has tremor-like shakes prior to her syncopsizing. In the ED patient BP was soft and has been noted to downtrend 101/59 >>98/61 with HR 90-100s. Labs significant for anemia Hgb 6.1, Iron 20, Iron sat 5%, and Ferritin 5 with an elevated Lactate 2.1 and elevated BUN/Cr ratio. No overt signes of bleeding on physical exam. Patient reports recurrent syncopal episodes since admission with similar presentation of tremors prior and persistent weakness. 2u pRBC ordered, one given at the time of assessment. CTH was unremarkable. Given 2G keppra, Reglan 10mg, and 1L in the ED for seizure precautions.     Outpatient work up for Migraines:  Describes having migraines ever since she can remember with no relief. Describes persistent unremitting migraines with associated mild light and photo sensitivity and nausea but no vomiting. Denies dizziness or gait imbalances.   Follows Karol Rubin MD with [facility] in [place]. Reports extensive failed outpatient course of triptans and Topiramate with discontinuation for ineffectiveness or side effects. Also reports failed relief after botox injections most recently last week. Reports no relief with current regiment: Amitriptyline taking for 1-month and Excedrin PRN, recently started on new nasal spray Zavegepant 10mg PRN with no relief.       PAST MEDICAL & SURGICAL HISTORY:  HTN (hypertension)      Migraine      Anxiety      COVID-19  4/2020      S/P LASIK surgery          MEDICATIONS:  acetaminophen     Tablet .. 650 milliGRAM(s) Oral every 6 hours PRN  amitriptyline 10 milliGRAM(s) Oral daily  multivitamin 1 Tablet(s) Oral daily  ondansetron Injectable 4 milliGRAM(s) IV Push every 8 hours PRN  pantoprazole  Injectable 40 milliGRAM(s) IV Push two times a day      ALLERGIES:  No Known Allergies      FAMILY HISTORY:        SOCIAL HISTORY:  Denies alcohol, tobacco, or illicit drug use      REVIEW OF SYSTEMS:  GENERAL: No fever, weight changes. (+) fatigue  EYES: No eye pain or discharge  EAR/NOSE/MOUTH/THROAT: No sinus or throat pain; No difficulty hearing  NECK: No pain or stiffness  RESPIRATORY: No cough, wheezing, chills, or hemoptysis  CARDIOVASCULAR: No chest pain, palpitations, shortness of breath, or dyspnea on exertion  GASTROINTESTINAL: No abdominal pain, nausea, vomiting, hematemesis, diarrhea, or constipation  GENITOURINARY: No dysuria, frequency, hematuria, or incontinence  SKIN: No rashes or lesions  ENDOCRINE: No heat or cold intolerance  HEMATOLOGIC: No easy bruising or bleeding  PSYCHIATRIC: No depression, anxiety, or mood swings  MUSCULOSKELETAL: No joint pain or swelling  NEUROLOGICAL: As per HPI          OBJECTIVE:    Vital Signs Last 24 Hrs  T(C): 36.8 (04 Oct 2023 07:28), Max: 37.1 (04 Oct 2023 00:51)  T(F): 98.2 (04 Oct 2023 07:28), Max: 98.8 (04 Oct 2023 00:51)  HR: 99 (04 Oct 2023 07:28) (89 - 100)  BP: 107/73 (04 Oct 2023 07:28) (107/73 - 132/76)  BP(mean): --  RR: 15 (04 Oct 2023 07:28) (15 - 18)  SpO2: 100% (04 Oct 2023 07:28) (100% - 100%)    Parameters below as of 04 Oct 2023 07:28  Patient On (Oxygen Delivery Method): room air        General Examination:  General: No acute distress  HEENT: Atraumatic, Normocephalic  Respiratory: CTA B/l.  No crackles, rhonchi, or wheezes.  Cardiovascular: RRR.  Normal S1 & S2.  Normal b/l radial and pedal pulses.    Neurological Examination:  General / Mental Status: AAO x 3.  No aphasia or dysarthria.  Naming and repetition intact.  Cranial Nerves: VFF x 4.  PERRL.  EOMI x 2, No nystagmus or diplopia.  B/l V1-V3 equal and intact to light touch and pinprick.  Symmetric facial movement and palate elevation.  B/l hearing equal to finger rub.  5/5 strength with b/l sternocleidomastoid & trapezius.  Midline tongue protrusion, with no atrophy or fasciculations.  Motor: Normal bulk & tone in all four extremities.  5/5 strength throughout all four extremities.  No downward drift, rigidity, spasticity, or tremors in any of the four extremities.  Sensory: Intact to light touch and pinprick in all four extremities.  Negative Romberg.  Reflex: 2+ and symmetric at b/l biceps, triceps, brachioradialis, patellae, and ankles.  Downgoing toes b/l.  Coordination: No dysmetria with b/l finger-to-nose and heel raise tests.  Symmetric rapid alternating movements b/l.  Gait: Normal, narrow-based gait.  No difficulty with tiptoe, heel, and tandem gaits.        LABORATORY VALUES:                        6.1    9.03  )-----------( 268      ( 04 Oct 2023 01:54 )             19.9       10-04    141  |  109<H>  |  40<H>  ----------------------------<  111<H>  3.7   |  24  |  0.58    Ca    8.0<L>      04 Oct 2023 01:54    TPro  5.6<L>  /  Alb  2.8<L>  /  TBili  0.2  /  DBili  x   /  AST  12  /  ALT  25  /  AlkPhos  66  10-04    LIVER FUNCTIONS - ( 04 Oct 2023 01:54 )  Alb: 2.8 g/dL / Pro: 5.6 g/dL / ALK PHOS: 66 U/L / ALT: 25 U/L DA / AST: 12 U/L / GGT: x                                 NEUROIMAGING:  ______________________________________________________________________  < from: CT Head No Cont (10.04.23 @ 02:27) >  FINDINGS:    There is no acute intracranial hemorrhage, vasogenic edemaor evidence   for acute large vascular territory infarct.    The ventricles, sulci and cisternal spaces are stable and unremarkable in   size and configuration.  There is no midline shift or abnormal   extra-axial fluid collection.    The calvarium is intact.  There are no osteoblastic or lytic calvarial or   skull base lesions.  The paranasal sinuses and mastoid air cells are   clear.    IMPRESSION:  Stable exam. No acute intracranial finding.    ______________________________________________________________________  EEG STUDY RESULTS 10/4/23:  STUDY INTERPRETATION    Findings: The background was continuous, spontaneously variable and reactive. During wakefulness, the posterior dominant rhythm consisted of symmetric, well-modulated 9Hz activity, with amplitude to 30 uV, that attenuated to eye opening.  Low amplitude frontal beta was noted in wakefulness.    Background Slowing:  No generalized background slowing was present.    Focal Slowing:   None were present.    Sleep Background:  Drowsiness was characterized by fragmentation, attenuation, and slowing of the background activity.    Sleep was characterized by the presence of vertex waves, symmetric sleep spindles and K-complexes.    Other Non-Epileptiform Findings:  None were present.    Interictal Epileptiform Activity:   None were present.    Events:  Clinical events: None recorded.  Seizures: None recorded.    Activation Procedures:   Hyperventilation was not performed.    Photic stimulation was not performed.     Artifacts:  Intermittent myogenic and movement artifacts were noted.    _____________________________________________________________  EEG SUMMARY/CLASSIFICATION    Normal EEG in the awake, drowsy and asleep states.  _____________________________________________________________  EEG IMPRESSION/CLINICAL CORRELATE    Normal EEG study.  No epileptiform pattern or seizure seen.              Please contact the Neurology consult service with any neurological questions.    Leobardo Lopez MD   of Neurology  [facility] [employer] [facility] at [facility]

## 2023-10-04 NOTE — ED ADULT NURSE NOTE - OBJECTIVE STATEMENT
the patient is a 53 y female complaining of head ache , dizziness s/p FALL at home . pt seized  in the hospital , Keppra  given

## 2023-10-04 NOTE — ED PROVIDER NOTE - CLINICAL SUMMARY MEDICAL DECISION MAKING FREE TEXT BOX
53-year-old female with history of migraine headaches, anemia since having gastric bypass 11 months ago presents after fainting episode. In ED while patient ambulating to bathroom she fainted, held by  to lowered her to floor. patient became incontinent of urine and was witnessed "shaking for a few seconds." On exam no postictal period.  ekg interpretation- NSR  lab interpretation- wbc wnl, H/H 6/19, trop wnl, lactate 2.1  imaging interpretation- CXR shows no focal infiltrate  CT head shows Stable exam. No acute intracranial finding.  Discussed above with patient/family. Patient agrees with plan f transfusion and admission. Signed consent place din chart.  patient stable for admission. Informed hospitalist and MAR.

## 2023-10-04 NOTE — CONSULT NOTE ADULT - NS ATTEND AMEND GEN_ALL_CORE FT
#SYMPTOMATIC ANEMIA- MULTIFACTORIAL  #GASTRIC BYPASS  11 MO AGO  #HEAVY MENORRHAGIA ( lasting ~ 2 weeks and requiring adult diapers )  last colonoscopy 4 y ago  GI eval  noted   f/u CBC, PRBC TX prn  to keep  HB>7   check iron study  pt may benefit from  IV iron tx  if needed can be given as an outpt  GYN eval  can be done as  an outpt ( seen by GYN  in the past, w/u + small cyst  and no fibroids as per pt )     # dvt ppx  call with questions [phone]   Thank you for the consult

## 2023-10-04 NOTE — ED ADULT NURSE REASSESSMENT NOTE - NS ED NURSE REASSESS COMMENT FT1
720 am pt resting now , no distress . report given to day shift RN
KENDY BERLIN COVERING NOTES: Started second unit of PRBC. No acute distress. Endorsed to CAPRI [place]
Pt is s/p 1 unit of PRBC. No transfusion reactions noted. Pt tolerated transfusion well.

## 2023-10-04 NOTE — CONSULT NOTE ADULT - SUBJECTIVE AND OBJECTIVE BOX
PATIENT SEEN AND EXAMINED BY CARYN CHISHOLM M.D. ON :- 10/4/23  DATE OF SERVICE:  10/4/23           Interim events noted,Labs ,Radiological studies and Cardiology tests reviewed .  DISCUSSED WITH ACP/MEDICAL RESIDENT ON PLAN OF CARE.    MR#[id number]  PATIENT NAME:[facility] COURSE: HPI:  53-year-old female with history of migraine headaches, anxiety,  anemia since having gastric bypass 11 months ago presents after episode of LOC.  Per the  patient was complaining of migraine headache earlier in the day when they went to [employer] and when they were leaving [employer] she felt weak and passed out he was able to hold onto her denies any head trauma at that time.  Reports that when she fainted he did notice some shaking activity and she urinated on self, says she woke up a few minutes later immediately at her baseline, and asked what was going on. Says that the same thing happened while they were in the ED.  Deny tongue bite in either instance.     Denies any chest pain, recent illness, bloody or black stools.  Patient denies any transfusions in the past for her anemia. Says last colonoscopy was 4 years ago, and says she recently had endoscopy as part of a procedure to evaluate for gallstones? (04 Oct 2023 06:58)      INTERIM EVENTS:Patient seen at bedside ,interim events noted.      PMH -reviewed admission note, no change since admission  HEART FAILURE: Acute[ ]Chronic[ ] Systolic[ ] Diastolic[ ] Combined Systolic and Diastolic[ ]  CAD[ ] CABG[ ] PCI[ ]  DEVICES[ ] PPM[ ] ICD[ ] ILR[ ]  ATRIAL FIBRILLATION[ ] Paroxysmal[ ] Permanent[ ] CHADS2-[  ]  ISSAC[ ] CKD1[ ] CKD2[ ] CKD3[ ] CKD4[ ] ESRD[ ]  COPD[ ] HTN[ ]   DM[ ] Type1[ ] Type 2[ ]   CVA[ ] Paresis[ ]    AMBULATION: Assisted[ ] Cane/walker[ ] Independent[ ]    MEDICATIONS  (STANDING):  acetaminophen   IVPB .. 1000 milliGRAM(s) IV Intermittent every 8 hours  amitriptyline 25 milliGRAM(s) Oral at bedtime  diphenhydrAMINE Elixir 25 milliGRAM(s) Oral once  metoclopramide Injectable 10 milliGRAM(s) IV Push once  multivitamin 1 Tablet(s) Oral daily  pantoprazole  Injectable 40 milliGRAM(s) IV Push two times a day    MEDICATIONS  (PRN):  acetaminophen     Tablet .. 650 milliGRAM(s) Oral every 6 hours PRN Temp greater or equal to 38C (100.4F), Mild Pain (1 - 3)  ondansetron Injectable 4 milliGRAM(s) IV Push every 8 hours PRN Nausea and/or Vomiting            REVIEW OF SYSTEMS:  Constitutional: [ ] fever, [ ]weight loss,  [ ]fatigue [ ]weight gain  Eyes: [ ] visual changes  Respiratory: [ ]shortness of breath;  [ ] cough, [ ]wheezing, [ ]chills, [ ]hemoptysis  Cardiovascular: [ ] chest pain, [ ]palpitations, [ ]dizziness,  [ ]leg swelling[ ]orthopnea[ ]PND  Gastrointestinal: [ ] abdominal pain, [ ]nausea, [ ]vomiting,  [ ]diarrhea [ ]Constipation [ ]Melena  Genitourinary: [ ] dysuria, [ ] hematuria [ ]Menendez  Neurologic: [ ] headaches [ ] tremors[ ]weakness [ ]Paralysis Right[ ] Left[ ]  Skin: [ ] itching, [ ]burning, [ ] rashes  Endocrine: [ ] heat or cold intolerance  Musculoskeletal: [ ] joint pain or swelling; [ ] muscle, back, or extremity pain  Psychiatric: [ ] depression, [ ]anxiety, [ ]mood swings, or [ ]difficulty sleeping  Hematologic: [ ] easy bruising, [ ] bleeding gums    [ ] All remaining systems negative except as per above.   [ ]Unable to obtain.  [x] No change in ROS since admission      Vital Signs Last 24 Hrs  T(C): 36.7 (04 Oct 2023 20:40), Max: 37.2 (04 Oct 2023 19:38)  T(F): 98.1 (04 Oct 2023 20:40), Max: 99 (04 Oct 2023 19:38)  HR: 98 (04 Oct 2023 20:40) (89 - 100)  BP: 134/81 (04 Oct 2023 20:40) (101/59 - 134/81)  BP(mean): --  RR: 18 (04 Oct 2023 20:40) (15 - 18)  SpO2: 98% (04 Oct 2023 20:40) (98% - 100%)    Parameters below as of 04 Oct 2023 20:40  Patient On (Oxygen Delivery Method): room air      I&O's Summary      PHYSICAL EXAM:  General: No acute distress BMI-  HEENT: EOMI, PERRL  Neck: Supple, [ ] JVD  Lungs: Equal air entry bilaterally; [ ] rales [ ] wheezing [ ] rhonchi  Heart: Regular rate and rhythm; [x ] murmur   2/6 [ x] systolic [ ] diastolic [ ] radiation[ ] rubs [ ]  gallops  Abdomen: Nontender, bowel sounds present  Extremities: No clubbing, cyanosis, [ ] edema [ ]Pulses  equal and intact  Nervous system:  Alert & Oriented X3, no focal deficits  Psychiatric: Normal affect  Skin: No rashes or lesions    LABS:  10-04    141  |  109<H>  |  40<H>  ----------------------------<  111<H>  3.7   |  24  |  0.58    Ca    8.0<L>      04 Oct 2023 01:54    TPro  5.6<L>  /  Alb  2.8<L>  /  TBili  0.2  /  DBili  x   /  AST  12  /  ALT  25  /  AlkPhos  66  10-04    Creatinine Trend: 0.58<--                        6.1    9.03  )-----------( 268      ( 04 Oct 2023 01:54 )             19.9

## 2023-10-04 NOTE — EEG REPORT - NS EEG TEXT BOX
[facility]   COMPREHENSIVE EPILEPSY CENTER   REPORT OF ROUTINE EEG W/ Video     [facility]: [address], [place] [address], Ph#: [phone]  [facility]: [id number], [place] [address], Ph#: [phone]  [facility]: [address] [place] [address], Ph#: [phone]    Patient Name: ALEXIS LICEA  Age and : 53y ([birth year])  MRN #: [id number]  Location: [address]  Referring Physician: Chayito Mcgarry    Study Date: 10-04-23    _____________________________________________________________  TECHNICAL INFORMATION    Placement and Labeling of Electrodes:  The EEG was performed utilizing 20 channels referential EEG connections (coronal over temporal over parasagittal montage) using all standard 10-20 electrode placements with EKG.  Recording was at a sampling rate of 256 samples per second per channel.  Time synchronized digital video recording was done simultaneously with EEG recording.  A low light infrared camera was used for low light recording.  Phong and seizure detection algorithms were utilized.    _____________________________________________________________  HISTORY    Patient is a 53y old  Female who presents with a chief complaint of symptomatic anemia (04 Oct 2023 06:58)      PERTINENT MEDICATION:  MEDICATIONS  (STANDING):  amitriptyline 10 milliGRAM(s) Oral daily  multivitamin 1 Tablet(s) Oral daily  pantoprazole  Injectable 40 milliGRAM(s) IV Push two times a day    _____________________________________________________________  STUDY INTERPRETATION    Findings: The background was continuous, spontaneously variable and reactive. During wakefulness, the posterior dominant rhythm consisted of symmetric, well-modulated 9Hz activity, with amplitude to 30 uV, that attenuated to eye opening.  Low amplitude frontal beta was noted in wakefulness.    Background Slowing:  No generalized background slowing was present.    Focal Slowing:   None were present.    Sleep Background:  Drowsiness was characterized by fragmentation, attenuation, and slowing of the background activity.    Sleep was characterized by the presence of vertex waves, symmetric sleep spindles and K-complexes.    Other Non-Epileptiform Findings:  None were present.    Interictal Epileptiform Activity:   None were present.    Events:  Clinical events: None recorded.  Seizures: None recorded.    Activation Procedures:   Hyperventilation was not performed.    Photic stimulation was not performed.     Artifacts:  Intermittent myogenic and movement artifacts were noted.    _____________________________________________________________  EEG SUMMARY/CLASSIFICATION    Normal EEG in the awake, drowsy and asleep states.  _____________________________________________________________  EEG IMPRESSION/CLINICAL CORRELATE    Normal EEG study.  No epileptiform pattern or seizure seen.

## 2023-10-04 NOTE — ED ADULT NURSE NOTE - NSFALLUNIVINTERV_ED_ALL_ED
Bed/Stretcher in lowest position, wheels locked, appropriate side rails in place/Call bell, personal items and telephone in reach/Instruct patient to call for assistance before getting out of bed/chair/stretcher/Non-slip footwear applied when patient is off stretcher/[place] to call system/Physically safe environment - no spills, clutter or unnecessary equipment/Purposeful proactive rounding/Room/bathroom lighting operational, light cord in reach

## 2023-10-04 NOTE — ED PROVIDER NOTE - OBJECTIVE STATEMENT
53-year-old female with history of migraine headaches, anemia since having gastric bypass 11 months ago presents after fainting episode.  Per the  patient was complaining of migraine headache earlier in the day when they went to [employer] and when they were leaving [employer] she felt weak and passed out he was able to hold onto her denies any head trauma at that time.  Reports that when she fainted he did notice some shaking activity.  Follow-up  also reports that patient urinated on herself.  Denies similar symptoms in the past.  Denies any chest pain, recent illness, bloody or black stools.  Patient denies any transfusions in the past for her anemia.  Evaluation performed in English language by me.

## 2023-10-05 ENCOUNTER — TRANSCRIPTION ENCOUNTER (OUTPATIENT)
Age: 53
End: 2023-10-05

## 2023-10-05 LAB
ALBUMIN SERPL ELPH-MCNC: 2.6 G/DL — LOW (ref 3.5–5)
ALP SERPL-CCNC: 54 U/L — SIGNIFICANT CHANGE UP (ref 40–120)
ALT FLD-CCNC: 22 U/L DA — SIGNIFICANT CHANGE UP (ref 10–60)
ANION GAP SERPL CALC-SCNC: 2 MMOL/L — LOW (ref 5–17)
AST SERPL-CCNC: 15 U/L — SIGNIFICANT CHANGE UP (ref 10–40)
BILIRUB SERPL-MCNC: 0.5 MG/DL — SIGNIFICANT CHANGE UP (ref 0.2–1.2)
BUN SERPL-MCNC: 18 MG/DL — SIGNIFICANT CHANGE UP (ref 7–18)
CALCIUM SERPL-MCNC: 8.3 MG/DL — LOW (ref 8.4–10.5)
CHLORIDE SERPL-SCNC: 111 MMOL/L — HIGH (ref 96–108)
CO2 SERPL-SCNC: 27 MMOL/L — SIGNIFICANT CHANGE UP (ref 22–31)
CREAT SERPL-MCNC: 0.46 MG/DL — LOW (ref 0.5–1.3)
CULTURE RESULTS: SIGNIFICANT CHANGE UP
EGFR: 114 ML/MIN/1.73M2 — SIGNIFICANT CHANGE UP
FOLATE SERPL-MCNC: 17.3 NG/ML — SIGNIFICANT CHANGE UP
GLUCOSE SERPL-MCNC: 86 MG/DL — SIGNIFICANT CHANGE UP (ref 70–99)
HAPTOGLOB SERPL-MCNC: 84 MG/DL — SIGNIFICANT CHANGE UP (ref 34–200)
HCT VFR BLD CALC: 24.8 % — LOW (ref 34.5–45)
HGB BLD-MCNC: 8.2 G/DL — LOW (ref 11.5–15.5)
LDH SERPL L TO P-CCNC: 107 U/L — LOW (ref 120–225)
MCHC RBC-ENTMCNC: 24.9 PG — LOW (ref 27–34)
MCHC RBC-ENTMCNC: 33.1 GM/DL — SIGNIFICANT CHANGE UP (ref 32–36)
MCV RBC AUTO: 75.4 FL — LOW (ref 80–100)
NRBC # BLD: 0 /100 WBCS — SIGNIFICANT CHANGE UP (ref 0–0)
PLATELET # BLD AUTO: 210 K/UL — SIGNIFICANT CHANGE UP (ref 150–400)
POTASSIUM SERPL-MCNC: 4.5 MMOL/L — SIGNIFICANT CHANGE UP (ref 3.5–5.3)
POTASSIUM SERPL-SCNC: 4.5 MMOL/L — SIGNIFICANT CHANGE UP (ref 3.5–5.3)
PROT SERPL-MCNC: 5.2 G/DL — LOW (ref 6–8.3)
RBC # BLD: 3.29 M/UL — LOW (ref 3.8–5.2)
RBC # BLD: 3.38 M/UL — LOW (ref 3.8–5.2)
RBC # FLD: 15.3 % — HIGH (ref 10.3–14.5)
RETICS #: 48.7 K/UL — SIGNIFICANT CHANGE UP (ref 25–125)
RETICS/RBC NFR: 1.4 % — SIGNIFICANT CHANGE UP (ref 0.5–2.5)
SODIUM SERPL-SCNC: 140 MMOL/L — SIGNIFICANT CHANGE UP (ref 135–145)
SPECIMEN SOURCE: SIGNIFICANT CHANGE UP
TSH SERPL-MCNC: 0.58 UU/ML — SIGNIFICANT CHANGE UP (ref 0.34–4.82)
VIT B12 SERPL-MCNC: 298 PG/ML — SIGNIFICANT CHANGE UP (ref 232–1245)
WBC # BLD: 7.37 K/UL — SIGNIFICANT CHANGE UP (ref 3.8–10.5)
WBC # FLD AUTO: 7.37 K/UL — SIGNIFICANT CHANGE UP (ref 3.8–10.5)

## 2023-10-05 PROCEDURE — 99233 SBSQ HOSP IP/OBS HIGH 50: CPT

## 2023-10-05 RX ORDER — ASCORBIC ACID 60 MG
500 TABLET,CHEWABLE ORAL DAILY
Refills: 0 | Status: DISCONTINUED | OUTPATIENT
Start: 2023-10-05 | End: 2023-10-05

## 2023-10-05 RX ORDER — INFLUENZA VIRUS VACCINE 15; 15; 15; 15 UG/.5ML; UG/.5ML; UG/.5ML; UG/.5ML
0.5 SUSPENSION INTRAMUSCULAR ONCE
Refills: 0 | Status: DISCONTINUED | OUTPATIENT
Start: 2023-10-05 | End: 2023-10-06

## 2023-10-05 RX ORDER — ACETAMINOPHEN 500 MG
1000 TABLET ORAL EVERY 8 HOURS
Refills: 0 | Status: COMPLETED | OUTPATIENT
Start: 2023-10-05 | End: 2023-10-05

## 2023-10-05 RX ORDER — SENNA PLUS 8.6 MG/1
2 TABLET ORAL AT BEDTIME
Refills: 0 | Status: DISCONTINUED | OUTPATIENT
Start: 2023-10-05 | End: 2023-10-06

## 2023-10-05 RX ORDER — FERROUS SULFATE 325(65) MG
325 TABLET ORAL EVERY 12 HOURS
Refills: 0 | Status: DISCONTINUED | OUTPATIENT
Start: 2023-10-05 | End: 2023-10-05

## 2023-10-05 RX ORDER — POLYETHYLENE GLYCOL 3350 17 G/17G
17 POWDER, FOR SOLUTION ORAL DAILY
Refills: 0 | Status: DISCONTINUED | OUTPATIENT
Start: 2023-10-05 | End: 2023-10-06

## 2023-10-05 RX ORDER — IRON SUCROSE 20 MG/ML
200 INJECTION, SOLUTION INTRAVENOUS ONCE
Refills: 0 | Status: COMPLETED | OUTPATIENT
Start: 2023-10-05 | End: 2023-10-05

## 2023-10-05 RX ADMIN — PANTOPRAZOLE SODIUM 40 MILLIGRAM(S): 20 TABLET, DELAYED RELEASE ORAL at 06:38

## 2023-10-05 RX ADMIN — POLYETHYLENE GLYCOL 3350 17 GRAM(S): 17 POWDER, FOR SOLUTION ORAL at 12:48

## 2023-10-05 RX ADMIN — Medication 400 MILLIGRAM(S): at 18:22

## 2023-10-05 RX ADMIN — Medication 1000 MILLIGRAM(S): at 00:01

## 2023-10-05 RX ADMIN — Medication 500 MILLIGRAM(S): at 12:48

## 2023-10-05 RX ADMIN — Medication 1 TABLET(S): at 15:43

## 2023-10-05 RX ADMIN — IRON SUCROSE 110 MILLIGRAM(S): 20 INJECTION, SOLUTION INTRAVENOUS at 15:44

## 2023-10-05 RX ADMIN — Medication 25 MILLIGRAM(S): at 21:32

## 2023-10-05 RX ADMIN — Medication 1000 MILLIGRAM(S): at 19:10

## 2023-10-05 NOTE — PROGRESS NOTE ADULT - ASSESSMENT
1. Iron deficiency anemia  2. No evidence of acute GI bleeding  3. R/o chronic GI bleeding  4. Constipation    Suggestions:    1. Monitor H/H  2. Transfuse PRBC as needed  3. Protonix daily  4. Avoid NSAID  5. Anemia work up  6. Iron supplement IV  7. Hematology evaluation  8. Check stool for occult blood  9. Further GI work up out patient  10. DVT prophylaxis

## 2023-10-05 NOTE — PROGRESS NOTE ADULT - SUBJECTIVE AND OBJECTIVE BOX
NEUROLOGY FOLLOW-UP NOTE    NAME:  ALEXIS LICEA      ASSESSMENT:  53F from home, ambulates independently, with PMHx Migraines, Anxiety, and Anemia presenting to the hospital after having a witnessed syncopal episodes with reported convulsions and urinary incontinence as witnessed by her . In the ED found to be hypotensive and anemic Hgb 6.1 with orthostasis requiring blood transfusions. CTH was unremarkable. EEG unremarkable in all phases making seizure less likely. Admitted to medicine for syncope work up and symptomatic anemia. Neurology consulted for seizure r/o and migraine management.       RECOMMENDATIONS:          NOTE TO BE COMPLETED - PLEASE REFER TO ABOVE ONLY AND IGNORE INFORMATION BELOW    ******************************    HPI:  53-year-old female with history of migraine headaches, anxiety,  anemia since having gastric bypass 11 months ago presents after episode of LOC.  Per the  patient was complaining of migraine headache earlier in the day when they went to [employer] and when they were leaving [employer] she felt weak and passed out he was able to hold onto her denies any head trauma at that time.  Reports that when she fainted he did notice some shaking activity and she urinated on self, says she woke up a few minutes later immediately at her baseline, and asked what was going on. Says that the same thing happened while they were in the ED.  Deny tongue bite in either instance.     Denies any chest pain, recent illness, bloody or black stools.  Patient denies any transfusions in the past for her anemia. Says last colonoscopy was 4 years ago, and says she recently had endoscopy as part of a procedure to evaluate for gallstones? (04 Oct 2023 06:58)      NEURO HPI:  53F from home, ambulates independently, with PMHx Migraines, Anxiety, and Anemia and PSHx gastric bypass 11 months ago, presenting to the hospital after having a witnessed syncopal episodes with reported convulsions and urinary incontinence as witnessed by her . No reported head trauma. Patient reports weakness with fatigue that has been affecting her for weeks, yesterday there was no change in the persistent symptoms. However prior to syncopal episodes she reports dizziness which was new for her. Also reports unremitting persistent headaches which she attributes to her poorly controlled migraines, denies change in intensity prior to event. Patient denies any prior occurrence. Denies fevers or recent illness. Although denies chills  states that she has tremor-like shakes prior to her syncopsizing. In the ED patient BP was soft and has been noted to downtrend 101/59 >>98/61 with HR 90-100s. Labs significant for anemia Hgb 6.1, Iron 20, Iron sat 5%, and Ferritin 5 with an elevated Lactate 2.1 and elevated BUN/Cr ratio. No overt signes of bleeding on physical exam. Patient reports recurrent syncopal episodes since admission with similar presentation of tremors prior and persistent weakness. 2u pRBC ordered, one given at the time of assessment. CTH was unremarkable. Given 2G keppra, Reglan 10mg, and 1L in the ED for seizure precautions.     Outpatient work up for Migraines:  Describes having migraines ever since she can remember with no relief. Describes persistent unremitting migraines with associated mild light and photo sensitivity and nausea but no vomiting. Denies dizziness or gait imbalances.   Follows Karol Rubin MD with [facility] in [place]. Reports extensive failed outpatient course of triptans and Topiramate with discontinuation for ineffectiveness or side effects. Also reports failed relief after botox injections most recently last week. Reports no relief with current regiment: Amitriptyline taking for 1-month and Excedrin PRN, recently started on new nasal spray Zavegepant 10mg PRN with no relief.       INTERVAL HISTORY:        MEDICATIONS:  acetaminophen     Tablet .. 650 milliGRAM(s) Oral every 6 hours PRN  amitriptyline 25 milliGRAM(s) Oral at bedtime  ascorbic acid 500 milliGRAM(s) Oral daily  diphenhydrAMINE Elixir 25 milliGRAM(s) Oral once  ferrous    sulfate 325 milliGRAM(s) Oral every 12 hours  iron sucrose IVPB 200 milliGRAM(s) IV Intermittent once  metoclopramide Injectable 10 milliGRAM(s) IV Push once  multivitamin 1 Tablet(s) Oral daily  ondansetron Injectable 4 milliGRAM(s) IV Push every 8 hours PRN  polyethylene glycol 3350 17 Gram(s) Oral daily  senna 2 Tablet(s) Oral at bedtime      ALLERGIES:  No Known Allergies      REVIEW OF SYSTEMS:  Fourteen systems reviewed and negative except as in HPI / Interval History.          OBJECTIVE:  Vital Signs Last 24 Hrs  T(C): 36.9 (05 Oct 2023 12:08), Max: 37.2 (04 Oct 2023 19:38)  T(F): 98.4 (05 Oct 2023 12:08), Max: 99 (04 Oct 2023 19:38)  HR: 88 (05 Oct 2023 12:08) (74 - 98)  BP: 123/77 (05 Oct 2023 12:08) (117/75 - 134/81)  BP(mean): --  RR: 16 (05 Oct 2023 12:08) (16 - 18)  SpO2: 98% (05 Oct 2023 12:08) (96% - 100%)    Parameters below as of 05 Oct 2023 12:08  Patient On (Oxygen Delivery Method): room air        General Examination:  General: No acute distress  HEENT: Atraumatic, Normocephalic  Respiratory: CTA B/l.  No crackles, rhonchi, or wheezes.  Cardiovascular: RRR.  Normal S1 & S2.  Normal b/l radial and pedal pulses.    Neurological Examination:  General / Mental Status: AAO x 3.  No aphasia or dysarthria.  Naming and repetition intact.  Cranial Nerves: VFF x 4.  PERRL.  EOMI x 2, No nystagmus or diplopia.  B/l V1-V3 equal and intact to light touch and pinprick.  Symmetric facial movement and palate elevation.  B/l hearing equal to finger rub.  5/5 strength with b/l sternocleidomastoid & trapezius.  Midline tongue protrusion, with no atrophy or fasciculations.  Motor: Normal bulk & tone in all four extremities.  5/5 strength throughout all four extremities.  No downward drift, rigidity, spasticity, or tremors in any of the four extremities.  Sensory: Intact to light touch and pinprick in all four extremities.  Negative Romberg.  Reflex: 2+ and symmetric at b/l biceps, triceps, brachioradialis, patellae, and ankles.  Downgoing toes b/l.  Coordination: No dysmetria with b/l finger-to-nose and heel raise tests.  Symmetric rapid alternating movements b/l.  Gait: Normal, narrow-based gait.  No difficulty with tiptoe, heel, and tandem gaits.        LABORATORY VALUES:                          8.2    7.37  )-----------( 210      ( 05 Oct 2023 07:28 )             24.8       10-05    140  |  111<H>  |  18  ----------------------------<  86  4.5   |  27  |  0.46<L>    Ca    8.3<L>      05 Oct 2023 07:28    TPro  5.2<L>  /  Alb  2.6<L>  /  TBili  0.5  /  DBili  x   /  AST  15  /  ALT  22  /  AlkPhos  54  10-05      LIVER FUNCTIONS - ( 05 Oct 2023 07:28 )  Alb: 2.6 g/dL / Pro: 5.2 g/dL / ALK PHOS: 54 U/L / ALT: 22 U/L DA / AST: 15 U/L / GGT: x                 Glucose Trend  10-05-23 @ 07:28   -  86 -- --  10-04-23 @ 02:15   -  -- -- 139<H>  10-04-23 @ 01:54   -  111<H> -- --  10-04-23 @ 01:16   -  -- -- 118<H>              NEUROIMAGING:  ______________________________________________________________________  < from: CT Head No Cont (10.04.23 @ 02:27) >  FINDINGS:    There is no acute intracranial hemorrhage, vasogenic edemaor evidence   for acute large vascular territory infarct.    The ventricles, sulci and cisternal spaces are stable and unremarkable in   size and configuration.  There is no midline shift or abnormal   extra-axial fluid collection.    The calvarium is intact.  There are no osteoblastic or lytic calvarial or   skull base lesions.  The paranasal sinuses and mastoid air cells are   clear.    IMPRESSION:  Stable exam. No acute intracranial finding.    ______________________________________________________________________  EEG STUDY RESULTS 10/4/23:  STUDY INTERPRETATION    Findings: The background was continuous, spontaneously variable and reactive. During wakefulness, the posterior dominant rhythm consisted of symmetric, well-modulated 9Hz activity, with amplitude to 30 uV, that attenuated to eye opening.  Low amplitude frontal beta was noted in wakefulness.    Background Slowing:  No generalized background slowing was present.    Focal Slowing:   None were present.    Sleep Background:  Drowsiness was characterized by fragmentation, attenuation, and slowing of the background activity.    Sleep was characterized by the presence of vertex waves, symmetric sleep spindles and K-complexes.    Other Non-Epileptiform Findings:  None were present.    Interictal Epileptiform Activity:   None were present.    Events:  Clinical events: None recorded.  Seizures: None recorded.    Activation Procedures:   Hyperventilation was not performed.    Photic stimulation was not performed.     Artifacts:  Intermittent myogenic and movement artifacts were noted.    _____________________________________________________________  EEG SUMMARY/CLASSIFICATION    Normal EEG in the awake, drowsy and asleep states.  _____________________________________________________________  EEG IMPRESSION/CLINICAL CORRELATE    Normal EEG study.  No epileptiform pattern or seizure seen.              Please contact the Neurology consult service with any neurological questions.      Leobardo Lopez MD   of Neurology  [facility] at [facility]         NEUROLOGY FOLLOW-UP NOTE    NAME:  ALEXIS LICEA      ASSESSMENT:  53F from home, ambulates independently, with PMHx Migraines, Anxiety, and Anemia presenting to the hospital after having a witnessed syncopal episodes with reported convulsions and urinary incontinence as witnessed by her . In the ED found to be hypotensive and anemic Hgb 6.1 with orthostasis requiring blood transfusions. CTH was unremarkable. EEG unremarkable in all phases making seizure less likely. Admitted to medicine for syncope work up and symptomatic anemia. Neurology consulted for seizure r/o and migraine management.       RECOMMENDATIONS:  1) Migraine, concern for complex migraines   - Continue with Amitriptyline 25mg qhs, reports mild improvement with migraine symptoms. Monitor for side effects while inpatient.    - c/w Tylenol 1G IVP TID PRN for tonight as she is still requiring. Start 1G PO Tylenol TID tomorrow in the AM. MAX 4G/day of acetaminophen.   - Can consider adding Excedrin if not non-formulary.   - Obtain records from Neurologist, Dr. Karol Rubin [phone] in [place] to better optimize migraine management for suspected complex migraine.   - No further head imaging needed given normal EEG, history, and presentation lower concern for seizures at this time.    2) Syncope likely 2/2 anemia and orthostatic hypotension.   - maintain euvolemic volume status; f/u orthostatic vitals   - Hgb remains stable 8.2 s/p 2u pRBC 10/4.  - Anemia work up as per primary team, heme/onc, and GI      Plan discussed with Neurology Attending, Dr. Lopez       HPI:  53-year-old female with history of migraine headaches, anxiety,  anemia since having gastric bypass 11 months ago presents after episode of LOC.  Per the  patient was complaining of migraine headache earlier in the day when they went to [employer] and when they were leaving [employer] she felt weak and passed out he was able to hold onto her denies any head trauma at that time.  Reports that when she fainted he did notice some shaking activity and she urinated on self, says she woke up a few minutes later immediately at her baseline, and asked what was going on. Says that the same thing happened while they were in the ED.  Deny tongue bite in either instance.     Denies any chest pain, recent illness, bloody or black stools.  Patient denies any transfusions in the past for her anemia. Says last colonoscopy was 4 years ago, and says she recently had endoscopy as part of a procedure to evaluate for gallstones? (04 Oct 2023 06:58)      NEURO HPI:  53F from home, ambulates independently, with PMHx Migraines, Anxiety, and Anemia and PSHx gastric bypass 11 months ago, presenting to the hospital after having a witnessed syncopal episodes with reported convulsions and urinary incontinence as witnessed by her . No reported head trauma. Patient reports weakness with fatigue that has been affecting her for weeks, yesterday there was no change in the persistent symptoms. However prior to syncopal episodes she reports dizziness which was new for her. Also reports unremitting persistent headaches which she attributes to her poorly controlled migraines, denies change in intensity prior to event. Patient denies any prior occurrence. Denies fevers or recent illness. Although denies chills  states that she has tremor-like shakes prior to her syncopsizing. In the ED patient BP was soft and has been noted to downtrend 101/59 >>98/61 with HR 90-100s. Labs significant for anemia Hgb 6.1, Iron 20, Iron sat 5%, and Ferritin 5 with an elevated Lactate 2.1 and elevated BUN/Cr ratio. No overt signes of bleeding on physical exam. Patient reports recurrent syncopal episodes since admission with similar presentation of tremors prior and persistent weakness. 2u pRBC ordered, one given at the time of assessment. CTH was unremarkable. Given 2G keppra, Reglan 10mg, and 1L in the ED for seizure precautions.     Outpatient work up for Migraines:  Describes having migraines ever since she can remember with no relief. Describes persistent unremitting migraines with associated mild light and photo sensitivity and nausea but no vomiting. Denies dizziness or gait imbalances.   Follows Karol Rubin MD with [facility] in [place]. Reports extensive failed outpatient course of triptans and Topiramate with discontinuation for ineffectiveness or side effects. Also reports failed relief after botox injections most recently last week. Reports no relief with current regiment: Amitriptyline taking for 1-month and Excedrin PRN, recently started on new nasal spray Zavegepant 10mg PRN with no relief.       INTERVAL HISTORY:  Tolerating new regiment with mild improvement in symptoms.       MEDICATIONS:  acetaminophen     Tablet .. 650 milliGRAM(s) Oral every 6 hours PRN  amitriptyline 25 milliGRAM(s) Oral at bedtime  ascorbic acid 500 milliGRAM(s) Oral daily  diphenhydrAMINE Elixir 25 milliGRAM(s) Oral once  ferrous    sulfate 325 milliGRAM(s) Oral every 12 hours  iron sucrose IVPB 200 milliGRAM(s) IV Intermittent once  metoclopramide Injectable 10 milliGRAM(s) IV Push once  multivitamin 1 Tablet(s) Oral daily  ondansetron Injectable 4 milliGRAM(s) IV Push every 8 hours PRN  polyethylene glycol 3350 17 Gram(s) Oral daily  senna 2 Tablet(s) Oral at bedtime      ALLERGIES:  No Known Allergies      REVIEW OF SYSTEMS:  Fourteen systems reviewed and negative except as in HPI / Interval History.          OBJECTIVE:  Vital Signs Last 24 Hrs  T(C): 36.9 (05 Oct 2023 12:08), Max: 37.2 (04 Oct 2023 19:38)  T(F): 98.4 (05 Oct 2023 12:08), Max: 99 (04 Oct 2023 19:38)  HR: 88 (05 Oct 2023 12:08) (74 - 98)  BP: 123/77 (05 Oct 2023 12:08) (117/75 - 134/81)  BP(mean): --  RR: 16 (05 Oct 2023 12:08) (16 - 18)  SpO2: 98% (05 Oct 2023 12:08) (96% - 100%)    Parameters below as of 05 Oct 2023 12:08  Patient On (Oxygen Delivery Method): room air        General Examination:  General: No acute distress  HEENT: Atraumatic, Normocephalic  Respiratory: CTA B/l.  No crackles, rhonchi, or wheezes.  Cardiovascular: RRR.  Normal S1 & S2.  Normal b/l radial and pedal pulses.    Neurological Examination:  General / Mental Status: AAO x 3.  No aphasia or dysarthria.  Naming and repetition intact.  Cranial Nerves: VFF x 4.  PERRL.  EOMI x 2, No nystagmus or diplopia.  B/l V1-V3 equal and intact to light touch and pinprick.  Symmetric facial movement and palate elevation.  B/l hearing equal to finger rub.  5/5 strength with b/l sternocleidomastoid & trapezius.  Midline tongue protrusion, with no atrophy or fasciculations.  Motor: Normal bulk & tone in all four extremities.  5/5 strength throughout all four extremities.  No downward drift, rigidity, spasticity, or tremors in any of the four extremities.  Sensory: Intact to light touch and pinprick in all four extremities.  Negative Romberg.  Reflex: 2+ and symmetric at b/l biceps, triceps, brachioradialis, patellae, and ankles.  Downgoing toes b/l.  Coordination: No dysmetria with b/l finger-to-nose and heel raise tests.  Symmetric rapid alternating movements b/l.  Gait: Normal, narrow-based gait. Improved gait stability with improved fatigue.         LABORATORY VALUES:                          8.2    7.37  )-----------( 210      ( 05 Oct 2023 07:28 )             24.8       10-05    140  |  111<H>  |  18  ----------------------------<  86  4.5   |  27  |  0.46<L>    Ca    8.3<L>      05 Oct 2023 07:28    TPro  5.2<L>  /  Alb  2.6<L>  /  TBili  0.5  /  DBili  x   /  AST  15  /  ALT  22  /  AlkPhos  54  10-05      LIVER FUNCTIONS - ( 05 Oct 2023 07:28 )  Alb: 2.6 g/dL / Pro: 5.2 g/dL / ALK PHOS: 54 U/L / ALT: 22 U/L DA / AST: 15 U/L / GGT: x                 Glucose Trend  10-05-23 @ 07:28   -  86 -- --  10-04-23 @ 02:15   -  -- -- 139<H>  10-04-23 @ 01:54   -  111<H> -- --  10-04-23 @ 01:16   -  -- -- 118<H>              NEUROIMAGING:  ______________________________________________________________________  < from: CT Head No Cont (10.04.23 @ 02:27) >  FINDINGS:    There is no acute intracranial hemorrhage, vasogenic edemaor evidence   for acute large vascular territory infarct.    The ventricles, sulci and cisternal spaces are stable and unremarkable in   size and configuration.  There is no midline shift or abnormal   extra-axial fluid collection.    The calvarium is intact.  There are no osteoblastic or lytic calvarial or   skull base lesions.  The paranasal sinuses and mastoid air cells are   clear.    IMPRESSION:  Stable exam. No acute intracranial finding.    ______________________________________________________________________  EEG STUDY RESULTS 10/4/23:  STUDY INTERPRETATION    Findings: The background was continuous, spontaneously variable and reactive. During wakefulness, the posterior dominant rhythm consisted of symmetric, well-modulated 9Hz activity, with amplitude to 30 uV, that attenuated to eye opening.  Low amplitude frontal beta was noted in wakefulness.    Background Slowing:  No generalized background slowing was present.    Focal Slowing:   None were present.    Sleep Background:  Drowsiness was characterized by fragmentation, attenuation, and slowing of the background activity.    Sleep was characterized by the presence of vertex waves, symmetric sleep spindles and K-complexes.    Other Non-Epileptiform Findings:  None were present.    Interictal Epileptiform Activity:   None were present.    Events:  Clinical events: None recorded.  Seizures: None recorded.    Activation Procedures:   Hyperventilation was not performed.    Photic stimulation was not performed.     Artifacts:  Intermittent myogenic and movement artifacts were noted.    _____________________________________________________________  EEG SUMMARY/CLASSIFICATION    Normal EEG in the awake, drowsy and asleep states.  _____________________________________________________________  EEG IMPRESSION/CLINICAL CORRELATE    Normal EEG study.  No epileptiform pattern or seizure seen.              Please contact the Neurology consult service with any neurological questions.      Leobardo Lopez MD   of Neurology  [facility] at [facility]         NEUROLOGY FOLLOW-UP NOTE    NAME:  ALEXIS LICEA      ASSESSMENT:  53F from home, ambulates independently, with PMHx Migraines, Anxiety, and Anemia presenting to the hospital after having a witnessed syncopal episodes with reported convulsions and urinary incontinence as witnessed by her . In the ED found to be hypotensive and anemic Hgb 6.1 with orthostasis requiring blood transfusions. CTH was unremarkable. EEG unremarkable in all phases making seizure less likely. Admitted to medicine for syncope work up and symptomatic anemia. Neurology consulted for seizure r/o and migraine management.       RECOMMENDATIONS:  1) Migraine, concern for complex migraines   - Continue with Amitriptyline 25mg qhs, reports mild improvement which is appropriate given patient already on medication >1 month in o/p. No side effects thus far.    - c/w Tylenol 1G PO Tylenol TID or Excedrin Migraine BID upon discharge. MAX 3G/day of acetaminophen to prevent development of medication overuse headache.    - Obtain records from Neurologist, Dr. Karol Rubin [phone] in [place] to better optimize migraine management for suspected complex migraine.  - No further head imaging needed given normal EEG, history, and presentation lower concern for seizures at this time.  - Recommend close Neurology follow up with o/p provider.      2) Syncope likely 2/2 anemia and orthostatic hypotension.   - maintain euvolemic volume status; f/u orthostatic vitals   - Hgb remains stable 8.2 s/p 2u pRBC 10/4.  - Anemia work up as per primary team, heme/onc, and GI      Plan discussed with Neurology Attending, Dr. Lopez   Will sign off on case, please re-consult Neurology for any additional questions.       ********************************************************************      HPI:  53-year-old female with history of migraine headaches, anxiety,  anemia since having gastric bypass 11 months ago presents after episode of LOC.  Per the  patient was complaining of migraine headache earlier in the day when they went to [employer] and when they were leaving [employer] she felt weak and passed out he was able to hold onto her denies any head trauma at that time.  Reports that when she fainted he did notice some shaking activity and she urinated on self, says she woke up a few minutes later immediately at her baseline, and asked what was going on. Says that the same thing happened while they were in the ED.  Deny tongue bite in either instance.     Denies any chest pain, recent illness, bloody or black stools.  Patient denies any transfusions in the past for her anemia. Says last colonoscopy was 4 years ago, and says she recently had endoscopy as part of a procedure to evaluate for gallstones? (04 Oct 2023 06:58)      NEURO HPI:  53F from home, ambulates independently, with PMHx Migraines, Anxiety, and Anemia and PSHx gastric bypass 11 months ago, presenting to the hospital after having a witnessed syncopal episodes with reported convulsions and urinary incontinence as witnessed by her . No reported head trauma. Patient reports weakness with fatigue that has been affecting her for weeks, yesterday there was no change in the persistent symptoms. However prior to syncopal episodes she reports dizziness which was new for her. Also reports unremitting persistent headaches which she attributes to her poorly controlled migraines, denies change in intensity prior to event. Patient denies any prior occurrence. Denies fevers or recent illness. Although denies chills  states that she has tremor-like shakes prior to her syncopsizing. In the ED patient BP was soft and has been noted to downtrend 101/59 >>98/61 with HR 90-100s. Labs significant for anemia Hgb 6.1, Iron 20, Iron sat 5%, and Ferritin 5 with an elevated Lactate 2.1 and elevated BUN/Cr ratio. No overt signes of bleeding on physical exam. Patient reports recurrent syncopal episodes since admission with similar presentation of tremors prior and persistent weakness. 2u pRBC ordered, one given at the time of assessment. CTH was unremarkable. Given 2G keppra, Reglan 10mg, and 1L in the ED for seizure precautions.     Outpatient work up for Migraines:  Describes having migraines ever since she can remember with no relief. Describes persistent unremitting migraines with associated mild light and photo sensitivity and nausea but no vomiting. Denies dizziness or gait imbalances.   Follows Karol Rubin MD with [facility] in [place]. Reports extensive failed outpatient course of triptans and Topiramate with discontinuation for ineffectiveness or side effects. Also reports failed relief after botox injections most recently last week. Reports no relief with current regiment: Amitriptyline taking for 1-month and Excedrin PRN, recently started on new nasal spray Zavegepant 10mg PRN with no relief.       INTERVAL HISTORY:  Tolerating new regiment with mild improvement in symptoms.       MEDICATIONS:  acetaminophen     Tablet .. 650 milliGRAM(s) Oral every 6 hours PRN  amitriptyline 25 milliGRAM(s) Oral at bedtime  ascorbic acid 500 milliGRAM(s) Oral daily  diphenhydrAMINE Elixir 25 milliGRAM(s) Oral once  ferrous    sulfate 325 milliGRAM(s) Oral every 12 hours  iron sucrose IVPB 200 milliGRAM(s) IV Intermittent once  metoclopramide Injectable 10 milliGRAM(s) IV Push once  multivitamin 1 Tablet(s) Oral daily  ondansetron Injectable 4 milliGRAM(s) IV Push every 8 hours PRN  polyethylene glycol 3350 17 Gram(s) Oral daily  senna 2 Tablet(s) Oral at bedtime      ALLERGIES:  No Known Allergies      REVIEW OF SYSTEMS:  Fourteen systems reviewed and negative except as in HPI / Interval History.          OBJECTIVE:  Vital Signs Last 24 Hrs  T(C): 36.9 (05 Oct 2023 12:08), Max: 37.2 (04 Oct 2023 19:38)  T(F): 98.4 (05 Oct 2023 12:08), Max: 99 (04 Oct 2023 19:38)  HR: 88 (05 Oct 2023 12:08) (74 - 98)  BP: 123/77 (05 Oct 2023 12:08) (117/75 - 134/81)  BP(mean): --  RR: 16 (05 Oct 2023 12:08) (16 - 18)  SpO2: 98% (05 Oct 2023 12:08) (96% - 100%)    Parameters below as of 05 Oct 2023 12:08  Patient On (Oxygen Delivery Method): room air        General Examination:  General: No acute distress  HEENT: Atraumatic, Normocephalic  Respiratory: CTA B/l.  No crackles, rhonchi, or wheezes.  Cardiovascular: RRR.  Normal S1 & S2.  Normal b/l radial and pedal pulses.    Neurological Examination:  General / Mental Status: AAO x 3.  No aphasia or dysarthria.  Naming and repetition intact.  Cranial Nerves: VFF x 4.  PERRL.  EOMI x 2, No nystagmus or diplopia.  B/l V1-V3 equal and intact to light touch and pinprick.  Symmetric facial movement and palate elevation.  B/l hearing equal to finger rub.  5/5 strength with b/l sternocleidomastoid & trapezius.  Midline tongue protrusion, with no atrophy or fasciculations.  Motor: Normal bulk & tone in all four extremities.  5/5 strength throughout all four extremities.  No downward drift, rigidity, spasticity, or tremors in any of the four extremities.  Sensory: Intact to light touch and pinprick in all four extremities.  Negative Romberg.  Reflex: 2+ and symmetric at b/l biceps, triceps, brachioradialis, patellae, and ankles.  Downgoing toes b/l.  Coordination: No dysmetria with b/l finger-to-nose and heel raise tests.  Symmetric rapid alternating movements b/l.  Gait: Normal, narrow-based gait. Improved gait stability with improved fatigue.         LABORATORY VALUES:                          8.2    7.37  )-----------( 210      ( 05 Oct 2023 07:28 )             24.8       10-05    140  |  111<H>  |  18  ----------------------------<  86  4.5   |  27  |  0.46<L>    Ca    8.3<L>      05 Oct 2023 07:28    TPro  5.2<L>  /  Alb  2.6<L>  /  TBili  0.5  /  DBili  x   /  AST  15  /  ALT  22  /  AlkPhos  54  10-05      LIVER FUNCTIONS - ( 05 Oct 2023 07:28 )  Alb: 2.6 g/dL / Pro: 5.2 g/dL / ALK PHOS: 54 U/L / ALT: 22 U/L DA / AST: 15 U/L / GGT: x                 Glucose Trend  10-05-23 @ 07:28   -  86 -- --  10-04-23 @ 02:15   -  -- -- 139<H>  10-04-23 @ 01:54   -  111<H> -- --  10-04-23 @ 01:16   -  -- -- 118<H>              NEUROIMAGING:  ______________________________________________________________________  < from: CT Head No Cont (10.04.23 @ 02:27) >  FINDINGS:    There is no acute intracranial hemorrhage, vasogenic edemaor evidence   for acute large vascular territory infarct.    The ventricles, sulci and cisternal spaces are stable and unremarkable in   size and configuration.  There is no midline shift or abnormal   extra-axial fluid collection.    The calvarium is intact.  There are no osteoblastic or lytic calvarial or   skull base lesions.  The paranasal sinuses and mastoid air cells are   clear.    IMPRESSION:  Stable exam. No acute intracranial finding.    ______________________________________________________________________  EEG STUDY RESULTS 10/4/23:  STUDY INTERPRETATION    Findings: The background was continuous, spontaneously variable and reactive. During wakefulness, the posterior dominant rhythm consisted of symmetric, well-modulated 9Hz activity, with amplitude to 30 uV, that attenuated to eye opening.  Low amplitude frontal beta was noted in wakefulness.    Background Slowing:  No generalized background slowing was present.    Focal Slowing:   None were present.    Sleep Background:  Drowsiness was characterized by fragmentation, attenuation, and slowing of the background activity.    Sleep was characterized by the presence of vertex waves, symmetric sleep spindles and K-complexes.    Other Non-Epileptiform Findings:  None were present.    Interictal Epileptiform Activity:   None were present.    Events:  Clinical events: None recorded.  Seizures: None recorded.    Activation Procedures:   Hyperventilation was not performed.    Photic stimulation was not performed.     Artifacts:  Intermittent myogenic and movement artifacts were noted.    _____________________________________________________________  EEG SUMMARY/CLASSIFICATION    Normal EEG in the awake, drowsy and asleep states.  _____________________________________________________________  EEG IMPRESSION/CLINICAL CORRELATE    Normal EEG study.  No epileptiform pattern or seizure seen.              Please contact the Neurology consult service with any neurological questions.      Leobardo Lopez MD   of Neurology  [facility]

## 2023-10-05 NOTE — PROGRESS NOTE ADULT - SUBJECTIVE AND OBJECTIVE BOX
Patient is a 53y old  Female who presents with a chief complaint of symptomatic anemia       SUBJECTIVE / OVERNIGHT EVENTS:      MEDICATIONS  (STANDING):  amitriptyline 25 milliGRAM(s) Oral at bedtime  diphenhydrAMINE Elixir 25 milliGRAM(s) Oral once  ferrous    sulfate 325 milliGRAM(s) Oral every 12 hours  metoclopramide Injectable 10 milliGRAM(s) IV Push once  multivitamin 1 Tablet(s) Oral daily  pantoprazole  Injectable 40 milliGRAM(s) IV Push two times a day  polyethylene glycol 3350 17 Gram(s) Oral daily  senna 2 Tablet(s) Oral at bedtime    MEDICATIONS  (PRN):  acetaminophen     Tablet .. 650 milliGRAM(s) Oral every 6 hours PRN Temp greater or equal to 38C (100.4F), Mild Pain (1 - 3)  ondansetron Injectable 4 milliGRAM(s) IV Push every 8 hours PRN Nausea and/or Vomiting    CAPILLARY BLOOD GLUCOSE        I&O's Summary      PHYSICAL EXAM:  Vital Signs Last 24 Hrs  T(C): 36.9 (05 Oct 2023 08:40), Max: 37.2 (04 Oct 2023 19:38)  T(F): 98.4 (05 Oct 2023 08:40), Max: 99 (04 Oct 2023 19:38)  HR: 74 (05 Oct 2023 08:40) (74 - 98)  BP: 117/75 (05 Oct 2023 08:40) (117/75 - 134/81)  BP(mean): --  RR: 17 (05 Oct 2023 08:40) (17 - 18)  SpO2: 99% (05 Oct 2023 08:40) (96% - 100%)    Parameters below as of 05 Oct 2023 08:40  Patient On (Oxygen Delivery Method): room air        LABS:                        8.2    7.37  )-----------( 210      ( 05 Oct 2023 07:28 )             24.8     10-05    140  |  111<H>  |  18  ----------------------------<  86  4.5   |  27  |  0.46<L>    Ca    8.3<L>      05 Oct 2023 07:28    TPro  5.2<L>  /  Alb  2.6<L>  /  TBili  0.5  /  DBili  x   /  AST  15  /  ALT  22  /  AlkPhos  54  10-05          Urinalysis Basic - ( 05 Oct 2023 07:28 )    Color: x / Appearance: x / SG: x / pH: x  Gluc: 86 mg/dL / Ketone: x  / Bili: x / Urobili: x   Blood: x / Protein: x / Nitrite: x   Leuk Esterase: x / RBC: x / WBC x   Sq Epi: x / Non Sq Epi: x / Bacteria: x                 Patient is a 53y old  Female who presents with a chief complaint of symptomatic anemia       SUBJECTIVE / OVERNIGHT EVENTS: events noted. Pt c/o fatigue, denies SOB, palpitations, dizziness  #[id number]      MEDICATIONS  (STANDING):  amitriptyline 25 milliGRAM(s) Oral at bedtime  diphenhydrAMINE Elixir 25 milliGRAM(s) Oral once  ferrous    sulfate 325 milliGRAM(s) Oral every 12 hours  metoclopramide Injectable 10 milliGRAM(s) IV Push once  multivitamin 1 Tablet(s) Oral daily  pantoprazole  Injectable 40 milliGRAM(s) IV Push two times a day  polyethylene glycol 3350 17 Gram(s) Oral daily  senna 2 Tablet(s) Oral at bedtime    MEDICATIONS  (PRN):  acetaminophen     Tablet .. 650 milliGRAM(s) Oral every 6 hours PRN Temp greater or equal to 38C (100.4F), Mild Pain (1 - 3)  ondansetron Injectable 4 milliGRAM(s) IV Push every 8 hours PRN Nausea and/or Vomiting    CAPILLARY BLOOD GLUCOSE        I&O's Summary      PHYSICAL EXAM:  Vital Signs Last 24 Hrs  T(C): 36.9 (05 Oct 2023 08:40), Max: 37.2 (04 Oct 2023 19:38)  T(F): 98.4 (05 Oct 2023 08:40), Max: 99 (04 Oct 2023 19:38)  HR: 74 (05 Oct 2023 08:40) (74 - 98)  BP: 117/75 (05 Oct 2023 08:40) (117/75 - 134/81)  BP(mean): --  RR: 17 (05 Oct 2023 08:40) (17 - 18)  SpO2: 99% (05 Oct 2023 08:40) (96% - 100%)    Parameters below as of 05 Oct 2023 08:40  Patient On (Oxygen Delivery Method): room air      GEN: NAD; A and O x 3, pallor  LUNGS: CTA B/L  HEART: S1 S2  ABDOMEN: soft, non-tender, non-distended, + BS  EXTREMITIES: no edema    LABS:                        8.2    7.37  )-----------( 210      ( 05 Oct 2023 07:28 )             24.8     10-05    140  |  111<H>  |  18  ----------------------------<  86  4.5   |  27  |  0.46<L>    Ca    8.3<L>      05 Oct 2023 07:28    TPro  5.2<L>  /  Alb  2.6<L>  /  TBili  0.5  /  DBili  x   /  AST  15  /  ALT  22  /  AlkPhos  54  10-05          Urinalysis Basic - ( 05 Oct 2023 07:28 )    Color: x / Appearance: x / SG: x / pH: x  Gluc: 86 mg/dL / Ketone: x  / Bili: x / Urobili: x   Blood: x / Protein: x / Nitrite: x   Leuk Esterase: x / RBC: x / WBC x   Sq Epi: x / Non Sq Epi: x / Bacteria: x

## 2023-10-05 NOTE — DISCHARGE NOTE PROVIDER - HOSPITAL COURSE
53-year-old female with history of migraine headaches, anxiety anemia since having gastric bypass 11 months ago presents after episode of LOC, admitted for syncope workup, likely 2/2 symptomatic anemia, but cannot rule out seizure or syncope 2/2 arrhythmia.      Problem: Syncope.   ·  Plan: Likely 2/2 symptomatic anemia, but cannot rule out seizure entirely  EKG showing NSR  CTH neg  Hb 6    -orthostatic vitals  -telemetry  -TTE  -cardio Dr. Sahu  -out of abundance of caution, seizure workup with EEG  -neuro Dr. Lopez.     Problem/Plan - 2:  ·  Problem: Symptomatic anemia.   ·  Plan: Hb 6, pt does not know baseline  microcytic anemia  Iron studies showing low iron, TIBC 419, %sat5  denying GIB, but given hx of bypass is at high risk for marginal ulcer (elevated BUN:Cr ratio)  s/p 2UpRBC, stable at 8.2  iron deficiency anemia, getting 1 dose venofer before dc. 53-year-old female with history of migraine headaches, anxiety, and anemia since having gastric bypass 11 months ago presents after episode of LOC. She was admitted for syncope workup. EKG found normal sinus rhythm. CT head was negative for intracranial pathology. Hemoglobin was found to be 6.1  on admission and she was given 2 units of packed red blood cells. She was also significantly iron deficient. Orthostatics were positive. EEG was normal.     Neuro:  Concern for complex migraine. Amitriptyline 25mg qhs was given. She was also given IV tylenol 1g for pain. She will be discharged with 1G Tylenol TID or Excedrin Migraine BID. She should follow with her neurologist outpatient. Syncope was likely secondary to anemia and orthostatic hypotension. She was counseled to drink plenty of fluids and follow outpatient for a comprehensive anemia workup.     Anemia:   She was severely iron deficient and given 2 doses of venofer while in the hospital. She will follow up outpatient with GI, heme/onc, and OB-GYN for an anemia workup. GI to rule out intra-abdominal bleeding. OB-GYN for the severely heavy menstrual periods.     Given patient's improved clinical status and current hemodynamic stability, decision was made to discharge. Discussed with attending. Please refer to patient's complete medical chart with documents for a full hospital course, for this is only a brief summary.   53-year-old female with history of migraine headaches, anxiety,  anemia since having gastric bypass 11 months ago complicated by episodes of menorrhagia,  presented after episode of LOC.  In the ED she was found to have  hemoglobin of  6.1, she was given 2 units of packed red blood cells. Pt was admitted for syncope likely 2/2 symptomatic anemia leading to orthostatic hypotension. EKG found normal sinus rhythm. CT head was negative for intracranial pathology. Orthostatics were positive. EEG was normal.  Pt found to be iron deficient, s/p 2 dose of Venofer, Hematology Dr. You was followed the case. Given concern for complex migraine. Amitriptyline 25mg qhs was given. She was also given IV Tylenol 1g for pain. She will be discharged with 1G Tylenol TID or Excedrin Migraine BID.       he should follow with her neurologist outpatient. Syal. She will follow up outpatient with GI, heme/onc, and OB-GYN for an anemia workup. GI to rule out intra-abdominal bleeding. OB-GYN for the severely heavy menstrual periods.     Given patient's improved clinical status and current hemodynamic stability, decision was made to discharge. Discussed with attending.  She will follow up outpatient with GI, heme/onc,  OB-GYN for an anemia workup. GI to rule out intra-abdominal bleeding. OB-GYN for the severely heavy menstrual periods.  Also follow up with neurology as OP. Please refer to patient's complete medical chart with documents for a full hospital course, for this is only a brief summary.

## 2023-10-05 NOTE — DISCHARGE NOTE PROVIDER - NPI NUMBER (FOR [employer] USE ONLY) :
[[id number]],[[id number]],[UNKNOWN],[UNKNOWN] [[id number]],[UNKNOWN],[UNKNOWN],[UNKNOWN],[[id number]] [[id number]],[[id number]],[UNKNOWN],[UNKNOWN],[[id number]]

## 2023-10-05 NOTE — PROGRESS NOTE ADULT - ASSESSMENT
complete note to follow           Assessment and Recommendation:   · Assessment	  53-year-old female with history of migraine headaches, anxiety,  anemia since having gastric bypass 11 months ago presents after episode of LOC.  Per the  patient was complaining of migraine headache earlier in the day when they went to [employer] and when they were leaving [employer] she felt weak and passed out he was able to hold onto her denies any head trauma at that time.  Reports that when she fainted he did notice some shaking activity and she urinated on self, says she woke up a few minutes later immediately at her baseline, and asked what was going on. Says that the same thing happened while they were in the ED.  Deny tongue bite in either instance.     Denies any chest pain, recent illness, bloody or black stools.  Patient denies any transfusions in the past for her anemia. Says last colonoscopy was 4 years ago, and says she recently had endoscopy as part of a procedure to evaluate for gallstones?    #Microcytic Anemia  p/w syncope, symptomatic anemia  ongoing menses, +menorrhagia  eval by GYN as outpt, no medication, no fibroids as per pt  Last colonoscopy 4 years ago  Hx gastric bypass 11 months ago  Hgb=6.1 MCV=75  Hgb in 12/2021 was 11.1, most recent Hgb on 9/13/23 was 9.5 on pt's [facility] norma  Plt wnl  Cr nl  Albumin 2.8  Ferritin=5, Sat 5%, TIBC nl  Head CT (-)  EEG nl  Rec's:  etiology menorrhagia, gastric bypass, nutritional +/- GIB  -PRBC transfusion for Hgb <7.0, s/p 2 units and Hgb improved to 8.2  -Give venofer 200mg IV qd x 3 days (if pt ready for d/c prior to completion can continue as outpt)  -Daily CBC  -Nutrition consult, hypoalbuminemia  -GI consult appreciated, w/u as outpt  -Neuro consult appreciated  -anemia panel shows RONALD, Retic and LDH are nl  -advised pt to also f/u with Her GYN upon d/c  -pt can f/u with Dr. Thompson outpt for RONALD    #VTE Prophylaxis    Thank you for the referral. Will continue to monitor the patient.  Please call with any questions [phone]  Above reviewed with Attending Dr. Thompson  [facility]  [address], [address], [place], [place]  [phone]  *Note not finalized until signed by Attending Physician

## 2023-10-05 NOTE — DISCHARGE NOTE PROVIDER - PROVIDER TOKENS
PROVIDER:[TOKEN:[[id number]],FOLLOWUP:[1 month],ESTABLISHEDPATIENT:[T]],FREE:[LAST:[Primary Care Doctor],PHONE:[(   )    -],FAX:[(   )    -],FOLLOWUP:[2 weeks]],FREE:[LAST:[OBGYN],PHONE:[(   )    -],FAX:[(   )    -],FOLLOWUP:[2 weeks]],FREE:[LAST:[GI specialist],PHONE:[(   )    -],FAX:[(   )    -],FOLLOWUP:[2 weeks]] PROVIDER:[TOKEN:[[id number]],FOLLOWUP:[1 month],ESTABLISHEDPATIENT:[T]],FREE:[LAST:[Primary Care Doctor],PHONE:[(   )    -],FAX:[(   )    -],FOLLOWUP:[2 weeks]],FREE:[LAST:[OBGYN],PHONE:[(   )    -],FAX:[(   )    -],FOLLOWUP:[2 weeks]],FREE:[LAST:[GI specialist],PHONE:[(   )    -],FAX:[(   )    -],FOLLOWUP:[2 weeks]],PROVIDER:[TOKEN:[[id number]],FOLLOWUP:[2 weeks]] PROVIDER:[TOKEN:[[id number]],FOLLOWUP:[1 month],ESTABLISHEDPATIENT:[T]],PROVIDER:[TOKEN:[[id number]],FOLLOWUP:[2 weeks]],FREE:[LAST:[Primary Care Doctor],PHONE:[(   )    -],FAX:[(   )    -],FOLLOWUP:[2 weeks]],FREE:[LAST:[OBGYN],PHONE:[(   )    -],FAX:[(   )    -],FOLLOWUP:[2 weeks]],PROVIDER:[TOKEN:[[id number]],FOLLOWUP:[2 weeks]]

## 2023-10-05 NOTE — DISCHARGE NOTE PROVIDER - ATTENDING DISCHARGE PHYSICAL EXAMINATION:
Vital Signs Last 24 Hrs  T(C): 37.1 (06 Oct 2023 16:20), Max: 37.1 (05 Oct 2023 20:36)  T(F): 98.8 (06 Oct 2023 16:20), Max: 98.8 (05 Oct 2023 20:36)  HR: 88 (06 Oct 2023 16:20) (71 - 94)  BP: 106/68 (06 Oct 2023 16:20) (106/68 - 120/79)  RR: 16 (06 Oct 2023 16:20) (16 - 18)  SpO2: 99% (06 Oct 2023 16:20) (97% - 99%)    Parameters below as of 06 Oct 2023 16:20  Patient On (Oxygen Delivery Method): room air    CONSTITUTIONAL: Well appearing, well nourished, awake, alert and in no apparent distress  ENMT: Airway patent, Nasal mucosa clear. Mouth with normal mucosa. Throat has no vesicles, no oropharyngeal exudates and uvula is midline.  EYES: Clear bilaterally, pupils equal, round and reactive to light. EOMI.  CARDIAC: Normal rate, regular rhythm.  Heart sounds S1, S2.  No murmurs, rubs or gallops   RESPIRATORY: Breath sounds clear and equal bilaterally. No wheezes, rales or rhonchi  MUSCULOSKELETAL: Spine appears normal, range of motion is not limited, no muscle or joint tenderness  EXTREMITIES: No edema, cyanosis or deformity   NEUROLOGICAL: Alert and oriented, no focal deficits, no motor or sensory deficits.  SKIN: No rash, skin turgor

## 2023-10-05 NOTE — PROGRESS NOTE ADULT - PROBLEM SELECTOR PLAN 1
Likely 2/2 symptomatic anemia, but cannot rule out seizure entirely  EKG showing NSR  CTH neg  Hb 6    -orthostatic vitals  -telemetry  -TTE  -cardio Dr. Sahu  -out of abundance of caution, seizure workup with EEG  -neuro Dr. Lopez

## 2023-10-05 NOTE — PROGRESS NOTE ADULT - ASSESSMENT
53-year-old female with history of migraine headaches, anxiety anemia since having gastric bypass 11 months ago presents after episode of LOC, admitted for syncope workup, likely 2/2 symptomatic anemia, but cannot rule out seizure or syncope 2/2 arrhythmia.    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

## 2023-10-05 NOTE — PROGRESS NOTE ADULT - TIME BILLING
- Review of records, telemetry, vital signs and daily labs.   - General and cardiovascular physical examination.  - Generation of cardiovascular treatment plan.  - Coordination of care.      Patient was seen and examined by me on 10/5/23,interim events noted,labs and radiology studies reviewed.  Juan Jose Sahu MD,FACC.  [address] [place], [place] [id number].  [phone]

## 2023-10-05 NOTE — PROGRESS NOTE ADULT - SUBJECTIVE AND OBJECTIVE BOX
PGY-1 Progress Note discussed with attending    PAGER #: [[phone]] TILL 5:00 PM  PLEASE CONTACT ON CALL TEAM:  - On Call Team (Please refer to Misty) FROM 5:00 PM - 8:30PM  - Nightfloat Team FROM 8:30 -7:30 AM    CHIEF COMPLAINT & BRIEF HOSPITAL COURSE:    INTERVAL HPI/OVERNIGHT EVENTS:   MEDICATIONS  (STANDING):  amitriptyline 25 milliGRAM(s) Oral at bedtime  ascorbic acid 500 milliGRAM(s) Oral daily  diphenhydrAMINE Elixir 25 milliGRAM(s) Oral once  ferrous    sulfate 325 milliGRAM(s) Oral every 12 hours  iron sucrose IVPB 200 milliGRAM(s) IV Intermittent once  metoclopramide Injectable 10 milliGRAM(s) IV Push once  multivitamin 1 Tablet(s) Oral daily  polyethylene glycol 3350 17 Gram(s) Oral daily  senna 2 Tablet(s) Oral at bedtime    MEDICATIONS  (PRN):  acetaminophen     Tablet .. 650 milliGRAM(s) Oral every 6 hours PRN Temp greater or equal to 38C (100.4F), Mild Pain (1 - 3)  ondansetron Injectable 4 milliGRAM(s) IV Push every 8 hours PRN Nausea and/or Vomiting      REVIEW OF SYSTEMS:  CONSTITUTIONAL: No fever, weight loss, or fatigue  RESPIRATORY: No shortness of breath  CARDIOVASCULAR: No chest pain  GASTROINTESTINAL: No abdominal pain.  GENITOURINARY: No dysuria  NEUROLOGICAL: No headaches  SKIN: No itching, burning, rashes    Vital Signs Last 24 Hrs  T(C): 36.9 (05 Oct 2023 12:08), Max: 37.2 (04 Oct 2023 19:38)  T(F): 98.4 (05 Oct 2023 12:08), Max: 99 (04 Oct 2023 19:38)  HR: 88 (05 Oct 2023 12:08) (74 - 98)  BP: 123/77 (05 Oct 2023 12:08) (117/75 - 134/81)  BP(mean): --  RR: 16 (05 Oct 2023 12:08) (16 - 18)  SpO2: 98% (05 Oct 2023 12:08) (96% - 100%)    Parameters below as of 05 Oct 2023 12:08  Patient On (Oxygen Delivery Method): room air        PHYSICAL EXAMINATION:  GENERAL: NAD, well built  HEAD:  Atraumatic, Normocephalic  EYES:  conjunctiva and sclera clear  CHEST/LUNG: Clear to auscultation. No rales, rhonchi, wheezing, or rubs  HEART: Regular rate and rhythm; No murmurs, rubs, or gallops  ABDOMEN: Soft, Nontender, Nondistended; Bowel sounds present  NERVOUS SYSTEM:  Alert & Oriented X3,    EXTREMITIES:  2+ Peripheral Pulses, No clubbing, cyanosis, or edema  SKIN: warm dry                          8.2    7.37  )-----------( 210      ( 05 Oct 2023 07:28 )             24.8     10-05    140  |  111<H>  |  18  ----------------------------<  86  4.5   |  27  |  0.46<L>    Ca    8.3<L>      05 Oct 2023 07:28    TPro  5.2<L>  /  Alb  2.6<L>  /  TBili  0.5  /  DBili  x   /  AST  15  /  ALT  22  /  AlkPhos  54  10-05    LIVER FUNCTIONS - ( 05 Oct 2023 07:28 )  Alb: 2.6 g/dL / Pro: 5.2 g/dL / ALK PHOS: 54 U/L / ALT: 22 U/L DA / AST: 15 U/L / GGT: x                   CAPILLARY BLOOD GLUCOSE      RADIOLOGY & ADDITIONAL TESTS:                   PGY-1 Progress Note discussed with attending    PAGER #: [[phone]] TILL 5:00 PM  PLEASE CONTACT ON CALL TEAM:  - On Call Team (Please refer to Misty) FROM 5:00 PM - 8:30PM  - Nightfloat Team FROM 8:30 -7:30 AM    CHIEF COMPLAINT & BRIEF HOSPITAL COURSE:     INTERVAL HPI/OVERNIGHT EVENTS:   MEDICATIONS  (STANDING):  amitriptyline 25 milliGRAM(s) Oral at bedtime  ascorbic acid 500 milliGRAM(s) Oral daily  diphenhydrAMINE Elixir 25 milliGRAM(s) Oral once  ferrous    sulfate 325 milliGRAM(s) Oral every 12 hours  iron sucrose IVPB 200 milliGRAM(s) IV Intermittent once  metoclopramide Injectable 10 milliGRAM(s) IV Push once  multivitamin 1 Tablet(s) Oral daily  polyethylene glycol 3350 17 Gram(s) Oral daily  senna 2 Tablet(s) Oral at bedtime    MEDICATIONS  (PRN):  acetaminophen     Tablet .. 650 milliGRAM(s) Oral every 6 hours PRN Temp greater or equal to 38C (100.4F), Mild Pain (1 - 3)  ondansetron Injectable 4 milliGRAM(s) IV Push every 8 hours PRN Nausea and/or Vomiting      REVIEW OF SYSTEMS:  CONSTITUTIONAL: No fever, weight loss, or fatigue  RESPIRATORY: No shortness of breath  CARDIOVASCULAR: No chest pain  GASTROINTESTINAL: No abdominal pain.  GENITOURINARY: + urinary incontinence during episodes  NEUROLOGICAL: + migraine  SKIN: No itching, burning, rashes    Vital Signs Last 24 Hrs  T(C): 36.9 (05 Oct 2023 12:08), Max: 37.2 (04 Oct 2023 19:38)  T(F): 98.4 (05 Oct 2023 12:08), Max: 99 (04 Oct 2023 19:38)  HR: 88 (05 Oct 2023 12:08) (74 - 98)  BP: 123/77 (05 Oct 2023 12:08) (117/75 - 134/81)  BP(mean): --  RR: 16 (05 Oct 2023 12:08) (16 - 18)  SpO2: 98% (05 Oct 2023 12:08) (96% - 100%)    Parameters below as of 05 Oct 2023 12:08  Patient On (Oxygen Delivery Method): room air        PHYSICAL EXAMINATION:  GENERAL: NAD, well built  HEAD:  Atraumatic, Normocephalic  EYES:  conjunctiva and sclera clear  CHEST/LUNG: Clear to auscultation. No rales, rhonchi, wheezing, or rubs  HEART: Regular rate and rhythm; No murmurs, rubs, or gallops  ABDOMEN: Soft, Nontender, Nondistended; Bowel sounds present  NERVOUS SYSTEM:  Alert & Oriented X3,    EXTREMITIES:  2+ Peripheral Pulses, No clubbing, cyanosis, or edema  SKIN: warm dry                          8.2    7.37  )-----------( 210      ( 05 Oct 2023 07:28 )             24.8     10-05    140  |  111<H>  |  18  ----------------------------<  86  4.5   |  27  |  0.46<L>    Ca    8.3<L>      05 Oct 2023 07:28    TPro  5.2<L>  /  Alb  2.6<L>  /  TBili  0.5  /  DBili  x   /  AST  15  /  ALT  22  /  AlkPhos  54  10-05    LIVER FUNCTIONS - ( 05 Oct 2023 07:28 )  Alb: 2.6 g/dL / Pro: 5.2 g/dL / ALK PHOS: 54 U/L / ALT: 22 U/L DA / AST: 15 U/L / GGT: x                   CAPILLARY BLOOD GLUCOSE      RADIOLOGY & ADDITIONAL TESTS:

## 2023-10-05 NOTE — PROGRESS NOTE ADULT - PROBLEM SELECTOR PLAN 2
Hb 6, pt does not know baseline  microcytic anemia  Iron studies showing low iron, TIBC 419, %sat5  denying GIB, but given hx of bypass is at high risk for marginal ulcer (elevated BUN:Cr ratio)    -starting 1 u PRBC  -f/u ferritin  -start iron supplementaiton  -f/u repeat CBC post transfusion Hb 6, pt does not know baseline  microcytic anemia  Iron studies showing low iron, TIBC 419, %sat5  denying GIB, but given hx of bypass is at high risk for marginal ulcer (elevated BUN:Cr ratio)  s/p 2UpRBC, stable at 8.2  iron deficiency anemia, getting 1 dose venofer before dc

## 2023-10-05 NOTE — PROGRESS NOTE ADULT - NS ATTEND AMEND GEN_ALL_CORE FT
#SYMPTOMATIC ANEMIA- MULTIFACTORIAL  #GASTRIC BYPASS  11 MO AGO  #HEAVY MENORRHAGIA ( lasting ~ 2 weeks and requiring adult diapers )  last colonoscopy 4 y ago  GI eval  noted   f/u CBC, PRBC TX prn  to keep  HB>7   noted iron study = RONALD  pt may benefit from  IV iron tx  as above  if d/c home we can offer iv Iron as an outpt  in our office   GYN eval  can be done as  an outpt ( seen by GYN  in the past, w/u + small cyst  and no fibroids as per pt )     # dvt ppx  call with questions [phone]

## 2023-10-05 NOTE — PROGRESS NOTE ADULT - SUBJECTIVE AND OBJECTIVE BOX
[   ] ICU                                          [   ] CCU                                      [ X  ] Medical Floor    Patient is a 53 year old female with anemia. GI consulted to evaluate.        53-year-old female with history of migraine headaches, anxiety,  anemia since having gastric bypass 11 months ago presents after episode of LOC.  Per the  patient was complaining of migraine headache earlier in the day when they went to [employer] and when they were leaving [employer] she felt weak and passed out he was able to hold onto her denies any head trauma at that time. Patient denies abdominal pain, nausea, vomiting, hematemesis, hematochezia, melena, fever, chills, chest pain, SOB, cough, hematuria, dysuria or diarrhea.      Patient is comfortable. No new complaints reported, No abdominal pain, N/V, hematemesis, hematochezia, melena, fever, chills, chest pain, SOB, cough or diarrhea reported.      PAIN MANAGEMENT:  Pain Scale:                0 /10  Pain Location:        Prior Colonoscopy:  4 years ago      PAST MEDICAL HISTORY    HTN (hypertension)     Migraine    Anxiety disorder    COVID-19        PAST SURGICAL HISTORY     LASIK surgery        Allergies    No Known Allergies    Intolerances  None         MEDICATIONS  (STANDING):  amitriptyline 10 milliGRAM(s) Oral daily  multivitamin 1 Tablet(s) Oral daily  pantoprazole  Injectable 40 milliGRAM(s) IV Push two times a day    MEDICATIONS  (PRN):  acetaminophen     Tablet .. 650 milliGRAM(s) Oral every 6 hours PRN Temp greater or equal to 38C (100.4F), Mild Pain (1 - 3)  ondansetron Injectable 4 milliGRAM(s) IV Push every 8 hours PRN Nausea and/or Vomiting      SOCIAL HISTORY  Advanced Directives:       [ X ] Full Code       [  ] DNR  Marital Status:         [  ] M      [ X ] S      [  ] D       [  ] W  Children:       [ X ] Yes      [  ] No  Occupation:        [  ] Employed       [ X ] Unemployed       [  ] Retired  Diet:       [ X ] Regular       [  ] PEG feeding          [  ] NG tube feeding  Drug Use:           [ X ] Patient denied          [  ] Yes  Alcohol:           [ X ] No             [  ] Yes (socially)         [  ] Yes (chronic)  Tobacco:           [  ] Yes           [ X ] No      FAMILY HISTORY  [ X ] Heart Disease            [ X ] Diabetes             [ x ] HTN             [  ] Colon Cancer             [  ] Stomach Cancer              [  ] Pancreatic Cancer        VITALS  Vital Signs Last 24 Hrs  T(C): 36.9 (05 Oct 2023 12:08), Max: 37.2 (04 Oct 2023 19:38)  T(F): 98.4 (05 Oct 2023 12:08), Max: 99 (04 Oct 2023 19:38)  HR: 88 (05 Oct 2023 12:08) (74 - 98)  BP: 123/77 (05 Oct 2023 12:08) (117/75 - 134/81)     RR: 16 (05 Oct 2023 12:08) (16 - 18)  SpO2: 98% (05 Oct 2023 12:08) (96% - 100%)    Parameters below as of 05 Oct 2023 12:08  Patient On (Oxygen Delivery Method): room air       MEDICATIONS  (STANDING):  amitriptyline 25 milliGRAM(s) Oral at bedtime  diphenhydrAMINE Elixir 25 milliGRAM(s) Oral once  iron sucrose IVPB 200 milliGRAM(s) IV Intermittent once  metoclopramide Injectable 10 milliGRAM(s) IV Push once  multivitamin 1 Tablet(s) Oral daily  polyethylene glycol 3350 17 Gram(s) Oral daily  senna 2 Tablet(s) Oral at bedtime    MEDICATIONS  (PRN):  acetaminophen     Tablet .. 650 milliGRAM(s) Oral every 6 hours PRN Temp greater or equal to 38C (100.4F), Mild Pain (1 - 3)  ondansetron Injectable 4 milliGRAM(s) IV Push every 8 hours PRN Nausea and/or Vomiting                            8.2    7.37  )-----------( 210      ( 05 Oct 2023 07:28 )             24.8       10-05    140  |  111<H>  |  18  ----------------------------<  86  4.5   |  27  |  0.46<L>    Ca    8.3<L>      05 Oct 2023 07:28    TPro  5.2<L>  /  Alb  2.6<L>  /  TBili  0.5  /  DBili  x   /  AST  15  /  ALT  22  /  AlkPhos  54  10-05

## 2023-10-05 NOTE — DISCHARGE NOTE PROVIDER - NSDCCPCAREPLAN_GEN_ALL_CORE_FT
PRINCIPAL DISCHARGE DIAGNOSIS  Diagnosis: Symptomatic anemia  Assessment and Plan of Treatment: You were admitted to the hospital for an episode of syncope and found to be severely anemic with a hemoglobin of 6.1. We transfuse anyone with hemoglobin less than 7.      SECONDARY DISCHARGE DIAGNOSES  Diagnosis: Syncope  Assessment and Plan of Treatment:      PRINCIPAL DISCHARGE DIAGNOSIS  Diagnosis: Symptomatic anemia  Assessment and Plan of Treatment: You were admitted to the hospital for an episode of syncope and found to be severely anemic with a hemoglobin of 6.1. We transfuse anyone with hemoglobin less than 7. We gave you two units of blood, and your hemoglobin stabilized in the 8s. The next morning, you hemoglobin was still stable, indicating that this is likely not an acute process, but probably something that has been going on for a while. You can follow outpatient with a GI specialist, a hematology specialist, and an OB-GYN for a complete anemia workup to determine where the anemia is coming from. We will also discharge you with iron tabs. Please take one tablet every other day. Spreading them out every other day minimizes the risk of constipation and actually maximizes iron uptake. Please follow with your primary care doctor as well.      SECONDARY DISCHARGE DIAGNOSES  Diagnosis: Syncope  Assessment and Plan of Treatment: You had an episode of syncope. EEG was negative for seizure activity. You did have a positive orthostatics test, which means your blood pressure dropped a lot when you moved from sitting to standing position. This is not something dangerous and happens in many people, but you should be careful about standing too quickly. Orthostatic syncope on top of your anemia is probably what caused your episode. It will be important for you to follow with the GI, hematology, and OBGYN doctors to figure out where your anemia is coming from. Make sure you drink plenty of fluids. If you pass out and hit your head, please seek medical attention.    Diagnosis: Migraine  Assessment and Plan of Treatment: You have a history of migraines. We started you on a medication called amitriptyline 25. Please continue to take amitriptyline nightly. You can also take either Tylenol three times a day (2 tabs of 500mg for a total of 1000mg) or Excedrin Migraine 1000 twice a day. It is important that you don't take more than the recommended dose because of increased chances of bleeding, and also because you can develop what is called a "medication overuse headache." Please follow with your neurologist.     PRINCIPAL DISCHARGE DIAGNOSIS  Diagnosis: Symptomatic anemia  Assessment and Plan of Treatment: You were admitted to the hospital for an episode of syncope and found to be severely anemic with a hemoglobin of 6.1. We transfuse anyone with hemoglobin less than 7. We gave you two units of blood, and your hemoglobin stabilized in the 8s. The next morning, you hemoglobin was still stable, indicating that this is likely not an acute process, but probably something that has been going on for a while. You received 2 doses of IV iron, as you were found to have low iron stores.   UPON DISCHARGE PLEASE TAKE FERROUS SULFATE (IRON TABLETS) 325 MG ORAL DAILY  WITH VITAMIN C TO INCREASE  THE ABSORPTION OF IRON. You should follow outpatient with a GI specialist, a hematology specialist , and an OB-GYN for a complete anemia workup to determine where the anemia is coming from.  Please follow with your primary care doctor as well in 1 week from discharge to monitor your blood counts and make sure it remains stable      SECONDARY DISCHARGE DIAGNOSES  Diagnosis: Syncope  Assessment and Plan of Treatment: You had an episode of syncope which was precipitated by the anemia. Imaging of your head was negative,  EEG was negative for seizure activity. You did have a positive orthostatics test, which means your blood pressure dropped a lot when you moved from sitting to standing position. This is not something dangerous and happens in many people, but you should be careful about standing too quickly. Orthostatic syncope on top of your anemia is probably what caused your episode. It will be important for you to follow with the GI, hematology, and OBGYN  in 2 wks from discharge to figure out where your anemia is coming from. Make sure you drink plenty of fluids. If you pass out or feel like your symptoms are comming back again please come to the nearest emergency room.    Diagnosis: Migraine  Assessment and Plan of Treatment: You have a history of migraines. We started you on a medication called amitriptyline 25. Please continue to take amitriptyline nightly. You can also take either Tylenol three times a day (2 tabs of 500mg for a total of 1000mg) or Excedrin Migraine 1000 twice a day. It is important that you don't take more than the recommended dose because of increased chances of bleeding, and also because you can develop what is called a "medication overuse headache." Please follow with your neurologist dr. Lopez in 2 wks from discharge     PRINCIPAL DISCHARGE DIAGNOSIS  Diagnosis: Symptomatic anemia  Assessment and Plan of Treatment: You were admitted to the hospital for an episode of syncope and found to be severely anemic with a hemoglobin of 6.1.  We gave you two units of blood, and your hemoglobin stabilized in the 8s. The next morning, you hemoglobin was still stable, indicating that this is likely not an acute process, but probably something that has been going on for a while. You received 2 doses of IV iron, as you were found to have low iron stores.   UPON DISCHARGE PLEASE TAKE FERROUS SULFATE (IRON TABLETS) 325 MG ORAL DAILY  WITH VITAMIN C TO INCREASE  THE ABSORPTION OF IRON. You should follow outpatient with a GI specialist, a hematology specialist , and an OB-GYN for a complete anemia workup to determine where the anemia is coming from.  Please follow with your primary care doctor as well in 1 week from discharge to monitor your blood counts and make sure it remains stable      SECONDARY DISCHARGE DIAGNOSES  Diagnosis: Menorrhagia  Assessment and Plan of Treatment: You have hx of menorrhagia  and are being followed with gyneocologist, no active bleeding at this time. Please follow up with out-patient provider    Diagnosis: Syncope  Assessment and Plan of Treatment: You had an episode of syncope which was precipitated by the anemia. Imaging of your head was negative,  EEG was negative for seizure activity. You did have a positive orthostatics test, which means your blood pressure dropped a lot when you moved from sitting to standing position. This is not something dangerous and happens in many people, but you should be careful about standing too quickly. Orthostatic syncope on top of your anemia is probably what caused your episode. It will be important for you to follow with the GI, hematology, and OBGYN  in 2 wks from discharge to figure out where your anemia is coming from. Make sure you drink plenty of fluids. If you pass out or feel like your symptoms are comming back again please come to the nearest emergency room.    Diagnosis: Migraine  Assessment and Plan of Treatment: You have a history of migraines. We started you on a medication called amitriptyline 25. Please continue to take amitriptyline nightly. You can also take either Tylenol three times a day (2 tabs of 500mg for a total of 1000mg) or Excedrin Migraine 1000 twice a day. It is important that you don't take more than the recommended dose because of increased chances of bleeding, and also because you can develop what is called a "medication overuse headache." Please follow with your neurologist dr. Lopez in 2 wks from discharge

## 2023-10-05 NOTE — PATIENT PROFILE ADULT - FALL HARM RISK - HARM RISK INTERVENTIONS

## 2023-10-05 NOTE — PROGRESS NOTE ADULT - PROBLEM SELECTOR PLAN 3
pt reports taking excedrin, amitryptiline, and receives botox injections    -hold excedrin as NSAID iso anemia  -consider stopping and TCA and start sumatriptan instead?

## 2023-10-05 NOTE — DISCHARGE NOTE PROVIDER - CARE PROVIDER_API CALL
OSCAR PIÑA  Phone: [phone]  Fax: ()-  Established Patient  Follow Up Time: 1 month    Primary Care Doctor,   Phone: (   )    -  Fax: (   )    -  Follow Up Time: 2 weeks    OBGYN,   Phone: (   )    -  Fax: (   )    -  Follow Up Time: 2 weeks    GI specialist,   Phone: (   )    -  Fax: (   )    -  Follow Up Time: 2 weeks   OSCAR PIÑA  Phone: [phone]  Fax: ()-  Established Patient  Follow Up Time: 1 month    Primary Care Doctor,   Phone: (   )    -  Fax: (   )    -  Follow Up Time: 2 weeks    OBGYN,   Phone: (   )    -  Fax: (   )    -  Follow Up Time: 2 weeks    GI specialist,   Phone: (   )    -  Fax: (   )    -  Follow Up Time: 2 weeks    Sara Thompson  [facility]  [address], [address]  [place] [address]  Phone: [phone]  Fax: [phone]  Follow Up Time: 2 weeks   OSCAR PIÑA  Phone: [phone]  Fax: ()-  Established Patient  Follow Up Time: 1 month    Donna Sara  Hematology  [address], [address]  [place] [address]  Phone: [phone]  Fax: [phone]  Follow Up Time: 2 weeks    Primary Care Doctor,   Phone: (   )    -  Fax: (   )    -  Follow Up Time: 2 weeks    OBKEARAN,   Phone: (   )    -  Fax: (   )    -  Follow Up Time: 2 weeks    Carson Watson  Gastroenterology  [address] [place] [address]  Phone: [phone]  Fax: [phone]  Follow Up Time: 2 weeks

## 2023-10-05 NOTE — DISCHARGE NOTE PROVIDER - NSDCMRMEDTOKEN_GEN_ALL_CORE_FT
amitriptyline 10 mg oral tablet: 1 tab(s) orally once a day  Excedrin Migraine 250 mg-250 mg-65 mg oral tablet: 2 tab(s) orally  Multiple Vitamins oral tablet: 1 tab(s) orally once a day   acetaminophen 500 mg oral capsule: 2 cap(s) orally prn MDD: 3000mg  amitriptyline 10 mg oral tablet: 1 tab(s) orally once a day  Excedrin Migraine 250 mg-250 mg-65 mg oral tablet: 2 tab(s) orally  Multiple Vitamins oral tablet: 1 tab(s) orally once a day   acetaminophen 500 mg oral capsule: 2 cap(s) orally prn MDD: 3000mg  amitriptyline 10 mg oral tablet: 1 tab(s) orally once a day  Excedrin Migraine 250 mg-250 mg-65 mg oral tablet: 2 tab(s) orally  Ferrex 150 Plus oral capsule: 1 cap(s) orally every other day  Multiple Vitamins oral tablet: 1 tab(s) orally once a day   acetaminophen 500 mg oral capsule: 2 cap(s) orally prn MDD: 3000mg  amitriptyline 10 mg oral tablet: 1 tab(s) orally once a day  Excedrin Migraine 250 mg-250 mg-65 mg oral tablet: 2 tab(s) orally  ferrous sulfate 325 mg (65 mg elemental iron) oral tablet: 1 tab(s) orally every other day  Multiple Vitamins oral tablet: 1 tab(s) orally once a day  Vitamin C 500 mg oral tablet, chewable: 1 tab(s) chewed every other day To be taken when taking iron tablet   acetaminophen 500 mg oral capsule: 2 cap(s) orally prn MDD: 3000mg  amitriptyline 25 mg oral tablet: 1 tab(s) orally once a day (at bedtime)  Excedrin Migraine 250 mg-250 mg-65 mg oral tablet: 2 tab(s) orally  ferrous sulfate 325 mg (65 mg elemental iron) oral tablet: 1 tab(s) orally every other day  Multiple Vitamins oral tablet: 1 tab(s) orally once a day  Vitamin C 500 mg oral tablet, chewable: 1 tab(s) chewed every other day To be taken when taking iron tablet

## 2023-10-05 NOTE — PROGRESS NOTE ADULT - SUBJECTIVE AND OBJECTIVE BOX
PATIENT SEEN AND EXAMINED BY CARYN CHISHOLM M.D. ON :- 10/5/23  DATE OF SERVICE: 10/5/23            Interim events noted,Labs ,Radiological studies and Cardiology tests reviewed .  DISCUSSED WITH ACP/MEDICAL RESIDENT ON PLAN OF CARE.    MR#[id number]  PATIENT NAME:[facility] COURSE: HPI:  53-year-old female with history of migraine headaches, anxiety,  anemia since having gastric bypass 11 months ago presents after episode of LOC.  Per the  patient was complaining of migraine headache earlier in the day when they went to [employer] and when they were leaving [employer] she felt weak and passed out he was able to hold onto her denies any head trauma at that time.  Reports that when she fainted he did notice some shaking activity and she urinated on self, says she woke up a few minutes later immediately at her baseline, and asked what was going on. Says that the same thing happened while they were in the ED.  Deny tongue bite in either instance.     Denies any chest pain, recent illness, bloody or black stools.  Patient denies any transfusions in the past for her anemia. Says last colonoscopy was 4 years ago, and says she recently had endoscopy as part of a procedure to evaluate for gallstones? (04 Oct 2023 06:58)      INTERIM EVENTS:Patient seen at bedside ,interim events noted.      PMH -reviewed admission note, no change since admission  HEART FAILURE: Acute[ ]Chronic[ ] Systolic[ ] Diastolic[ ] Combined Systolic and Diastolic[ ]  CAD[ ] CABG[ ] PCI[ ]  DEVICES[ ] PPM[ ] ICD[ ] ILR[ ]  ATRIAL FIBRILLATION[ ] Paroxysmal[ ] Permanent[ ] CHADS2-[  ]  ISSAC[ ] CKD1[ ] CKD2[ ] CKD3[ ] CKD4[ ] ESRD[ ]  COPD[ ] HTN[ ]   DM[ ] Type1[ ] Type 2[ ]   CVA[ ] Paresis[ ]    AMBULATION: Assisted[ ] Cane/walker[ ] Independent[ ]    MEDICATIONS  (STANDING):  amitriptyline 25 milliGRAM(s) Oral at bedtime  diphenhydrAMINE Elixir 25 milliGRAM(s) Oral once  influenza   Vaccine 0.5 milliLiter(s) IntraMuscular once  metoclopramide Injectable 10 milliGRAM(s) IV Push once  multivitamin 1 Tablet(s) Oral daily  polyethylene glycol 3350 17 Gram(s) Oral daily  senna 2 Tablet(s) Oral at bedtime    MEDICATIONS  (PRN):  ondansetron Injectable 4 milliGRAM(s) IV Push every 8 hours PRN Nausea and/or Vomiting            REVIEW OF SYSTEMS:  Constitutional: [ ] fever, [ ]weight loss,  [ ]fatigue [ ]weight gain  Eyes: [ ] visual changes  Respiratory: [ ]shortness of breath;  [ ] cough, [ ]wheezing, [ ]chills, [ ]hemoptysis  Cardiovascular: [ ] chest pain, [ ]palpitations, [ ]dizziness,  [ ]leg swelling[ ]orthopnea[ ]PND  Gastrointestinal: [ ] abdominal pain, [ ]nausea, [ ]vomiting,  [ ]diarrhea [ ]Constipation [ ]Melena  Genitourinary: [ ] dysuria, [ ] hematuria [ ]Menendez  Neurologic: [ ] headaches [ ] tremors[ ]weakness [ ]Paralysis Right[ ] Left[ ]  Skin: [ ] itching, [ ]burning, [ ] rashes  Endocrine: [ ] heat or cold intolerance  Musculoskeletal: [ ] joint pain or swelling; [ ] muscle, back, or extremity pain  Psychiatric: [ ] depression, [ ]anxiety, [ ]mood swings, or [ ]difficulty sleeping  Hematologic: [ ] easy bruising, [ ] bleeding gums    [ ] All remaining systems negative except as per above.   [ ]Unable to obtain.  [x] No change in ROS since admission      Vital Signs Last 24 Hrs  T(C): 37.1 (05 Oct 2023 20:36), Max: 37.1 (05 Oct 2023 20:36)  T(F): 98.8 (05 Oct 2023 20:36), Max: 98.8 (05 Oct 2023 20:36)  HR: 94 (05 Oct 2023 20:36) (74 - 94)  BP: 120/73 (05 Oct 2023 20:36) (117/75 - 132/87)  BP(mean): --  RR: 18 (05 Oct 2023 20:36) (16 - 18)  SpO2: 97% (05 Oct 2023 20:36) (96% - 99%)    Parameters below as of 05 Oct 2023 20:36  Patient On (Oxygen Delivery Method): room air      I&O's Summary    05 Oct 2023 07:01  -  05 Oct 2023 21:17  --------------------------------------------------------  IN: 275 mL / OUT: 650 mL / NET: -375 mL        PHYSICAL EXAM:  General: No acute distress BMI-  HEENT: EOMI, PERRL  Neck: Supple, [ ] JVD  Lungs: Equal air entry bilaterally; [ ] rales [ ] wheezing [ ] rhonchi  Heart: Regular rate and rhythm; [x ] murmur   2/6 [ x] systolic [ ] diastolic [ ] radiation[ ] rubs [ ]  gallops  Abdomen: Nontender, bowel sounds present  Extremities: No clubbing, cyanosis, [ ] edema [ ]Pulses  equal and intact  Nervous system:  Alert & Oriented X3, no focal deficits  Psychiatric: Normal affect  Skin: No rashes or lesions    LABS:  10-05    140  |  111<H>  |  18  ----------------------------<  86  4.5   |  27  |  0.46<L>    Ca    8.3<L>      05 Oct 2023 07:28    TPro  5.2<L>  /  Alb  2.6<L>  /  TBili  0.5  /  DBili  x   /  AST  15  /  ALT  22  /  AlkPhos  54  10-05    Creatinine Trend: 0.46<--, 0.58<--                        8.2    7.37  )-----------( 210      ( 05 Oct 2023 07:28 )             24.8

## 2023-10-06 ENCOUNTER — TRANSCRIPTION ENCOUNTER (OUTPATIENT)
Age: 53
End: 2023-10-06

## 2023-10-06 VITALS
RESPIRATION RATE: 16 BRPM | OXYGEN SATURATION: 99 % | HEART RATE: 88 BPM | SYSTOLIC BLOOD PRESSURE: 106 MMHG | DIASTOLIC BLOOD PRESSURE: 68 MMHG | TEMPERATURE: 99 F

## 2023-10-06 LAB
ANION GAP SERPL CALC-SCNC: 5 MMOL/L — SIGNIFICANT CHANGE UP (ref 5–17)
BUN SERPL-MCNC: 14 MG/DL — SIGNIFICANT CHANGE UP (ref 7–18)
CALCIUM SERPL-MCNC: 8.1 MG/DL — LOW (ref 8.4–10.5)
CHLORIDE SERPL-SCNC: 110 MMOL/L — HIGH (ref 96–108)
CO2 SERPL-SCNC: 26 MMOL/L — SIGNIFICANT CHANGE UP (ref 22–31)
CREAT SERPL-MCNC: 0.54 MG/DL — SIGNIFICANT CHANGE UP (ref 0.5–1.3)
EGFR: 109 ML/MIN/1.73M2 — SIGNIFICANT CHANGE UP
GLUCOSE SERPL-MCNC: 89 MG/DL — SIGNIFICANT CHANGE UP (ref 70–99)
HCT VFR BLD CALC: 27.4 % — LOW (ref 34.5–45)
HGB BLD-MCNC: 8.8 G/DL — LOW (ref 11.5–15.5)
MAGNESIUM SERPL-MCNC: 2.1 MG/DL — SIGNIFICANT CHANGE UP (ref 1.6–2.6)
MCHC RBC-ENTMCNC: 24.9 PG — LOW (ref 27–34)
MCHC RBC-ENTMCNC: 32.1 GM/DL — SIGNIFICANT CHANGE UP (ref 32–36)
MCV RBC AUTO: 77.4 FL — LOW (ref 80–100)
NRBC # BLD: 0 /100 WBCS — SIGNIFICANT CHANGE UP (ref 0–0)
PHOSPHATE SERPL-MCNC: 3.3 MG/DL — SIGNIFICANT CHANGE UP (ref 2.5–4.5)
PLATELET # BLD AUTO: 244 K/UL — SIGNIFICANT CHANGE UP (ref 150–400)
POTASSIUM SERPL-MCNC: 4.3 MMOL/L — SIGNIFICANT CHANGE UP (ref 3.5–5.3)
POTASSIUM SERPL-SCNC: 4.3 MMOL/L — SIGNIFICANT CHANGE UP (ref 3.5–5.3)
PROT SERPL-MCNC: 5.7 G/DL — LOW (ref 6–8.3)
PROT SERPL-MCNC: 5.7 G/DL — LOW (ref 6–8.3)
RBC # BLD: 3.54 M/UL — LOW (ref 3.8–5.2)
RBC # FLD: 15.7 % — HIGH (ref 10.3–14.5)
SODIUM SERPL-SCNC: 141 MMOL/L — SIGNIFICANT CHANGE UP (ref 135–145)
WBC # BLD: 6.85 K/UL — SIGNIFICANT CHANGE UP (ref 3.8–10.5)
WBC # FLD AUTO: 6.85 K/UL — SIGNIFICANT CHANGE UP (ref 3.8–10.5)

## 2023-10-06 PROCEDURE — 36430 TRANSFUSION BLD/BLD COMPNT: CPT

## 2023-10-06 PROCEDURE — 87086 URINE CULTURE/COLONY COUNT: CPT

## 2023-10-06 PROCEDURE — 85045 AUTOMATED RETICULOCYTE COUNT: CPT

## 2023-10-06 PROCEDURE — 99285 EMERGENCY DEPT VISIT HI MDM: CPT | Mod: 25

## 2023-10-06 PROCEDURE — 84443 ASSAY THYROID STIM HORMONE: CPT

## 2023-10-06 PROCEDURE — 83735 ASSAY OF MAGNESIUM: CPT

## 2023-10-06 PROCEDURE — 82746 ASSAY OF FOLIC ACID SERUM: CPT

## 2023-10-06 PROCEDURE — 95816 EEG AWAKE AND DROWSY: CPT

## 2023-10-06 PROCEDURE — 86923 COMPATIBILITY TEST ELECTRIC: CPT

## 2023-10-06 PROCEDURE — 84155 ASSAY OF PROTEIN SERUM: CPT

## 2023-10-06 PROCEDURE — 81003 URINALYSIS AUTO W/O SCOPE: CPT

## 2023-10-06 PROCEDURE — 83615 LACTATE (LD) (LDH) ENZYME: CPT

## 2023-10-06 PROCEDURE — 70450 CT HEAD/BRAIN W/O DYE: CPT | Mod: MA

## 2023-10-06 PROCEDURE — 96374 THER/PROPH/DIAG INJ IV PUSH: CPT

## 2023-10-06 PROCEDURE — 85027 COMPLETE CBC AUTOMATED: CPT

## 2023-10-06 PROCEDURE — 86850 RBC ANTIBODY SCREEN: CPT

## 2023-10-06 PROCEDURE — 80048 BASIC METABOLIC PNL TOTAL CA: CPT

## 2023-10-06 PROCEDURE — 85025 COMPLETE CBC W/AUTO DIFF WBC: CPT

## 2023-10-06 PROCEDURE — P9040: CPT

## 2023-10-06 PROCEDURE — 83010 ASSAY OF HAPTOGLOBIN QUANT: CPT

## 2023-10-06 PROCEDURE — 71045 X-RAY EXAM CHEST 1 VIEW: CPT

## 2023-10-06 PROCEDURE — 82607 VITAMIN B-12: CPT

## 2023-10-06 PROCEDURE — 84702 CHORIONIC GONADOTROPIN TEST: CPT

## 2023-10-06 PROCEDURE — 80053 COMPREHEN METABOLIC PANEL: CPT

## 2023-10-06 PROCEDURE — 36415 COLL VENOUS BLD VENIPUNCTURE: CPT

## 2023-10-06 PROCEDURE — 95957 EEG DIGITAL ANALYSIS: CPT

## 2023-10-06 PROCEDURE — 99239 HOSP IP/OBS DSCHRG MGMT >30: CPT | Mod: GC

## 2023-10-06 PROCEDURE — 86900 BLOOD TYPING SEROLOGIC ABO: CPT

## 2023-10-06 PROCEDURE — 82962 GLUCOSE BLOOD TEST: CPT

## 2023-10-06 PROCEDURE — 84100 ASSAY OF PHOSPHORUS: CPT

## 2023-10-06 PROCEDURE — 83540 ASSAY OF IRON: CPT

## 2023-10-06 PROCEDURE — 83605 ASSAY OF LACTIC ACID: CPT

## 2023-10-06 PROCEDURE — 93005 ELECTROCARDIOGRAM TRACING: CPT

## 2023-10-06 PROCEDURE — 86901 BLOOD TYPING SEROLOGIC RH(D): CPT

## 2023-10-06 PROCEDURE — 84484 ASSAY OF TROPONIN QUANT: CPT

## 2023-10-06 PROCEDURE — 83550 IRON BINDING TEST: CPT

## 2023-10-06 PROCEDURE — 82728 ASSAY OF FERRITIN: CPT

## 2023-10-06 PROCEDURE — T1013: CPT

## 2023-10-06 PROCEDURE — 84165 PROTEIN E-PHORESIS SERUM: CPT

## 2023-10-06 RX ORDER — ASCORBIC ACID 60 MG
1 TABLET,CHEWABLE ORAL
Qty: 15 | Refills: 0
Start: 2023-10-06 | End: 2023-11-04

## 2023-10-06 RX ORDER — IRON BG/IRON PS CPLX/C/CA-THR 150MG-50MG
1 CAPSULE ORAL
Qty: 15 | Refills: 0
Start: 2023-10-06 | End: 2023-11-04

## 2023-10-06 RX ORDER — ACETAMINOPHEN 500 MG
1000 TABLET ORAL ONCE
Refills: 0 | Status: COMPLETED | OUTPATIENT
Start: 2023-10-06 | End: 2023-10-06

## 2023-10-06 RX ORDER — AMITRIPTYLINE HCL 25 MG
1 TABLET ORAL
Refills: 0 | DISCHARGE

## 2023-10-06 RX ORDER — ACETAMINOPHEN 500 MG
2 TABLET ORAL
Qty: 60 | Refills: 0
Start: 2023-10-06 | End: 2023-11-04

## 2023-10-06 RX ORDER — AMITRIPTYLINE HCL 25 MG
1 TABLET ORAL
Qty: 30 | Refills: 0
Start: 2023-10-06 | End: 2023-11-04

## 2023-10-06 RX ORDER — FERROUS SULFATE 325(65) MG
1 TABLET ORAL
Qty: 15 | Refills: 0
Start: 2023-10-06 | End: 2023-11-04

## 2023-10-06 RX ADMIN — Medication 1000 MILLIGRAM(S): at 17:47

## 2023-10-06 RX ADMIN — POLYETHYLENE GLYCOL 3350 17 GRAM(S): 17 POWDER, FOR SOLUTION ORAL at 13:24

## 2023-10-06 RX ADMIN — Medication 1 TABLET(S): at 13:23

## 2023-10-06 RX ADMIN — Medication 1000 MILLIGRAM(S): at 18:17

## 2023-10-06 NOTE — PROGRESS NOTE ADULT - NEGATIVE NEUROLOGICAL SYMPTOMS
no tremors/no vertigo/no loss of sensation/no headache
no tremors/no vertigo/no loss of sensation/no headache

## 2023-10-06 NOTE — PROGRESS NOTE ADULT - SUBJECTIVE AND OBJECTIVE BOX
Patient is a 53y old  Female who presents with a chief complaint of symptomatic anemia       SUBJECTIVE / OVERNIGHT EVENTS: events noted. Pt c/o headache, fatigue improved         MEDICATIONS  (STANDING):  amitriptyline 25 milliGRAM(s) Oral at bedtime  diphenhydrAMINE Elixir 25 milliGRAM(s) Oral once  ferrous    sulfate 325 milliGRAM(s) Oral every 12 hours  metoclopramide Injectable 10 milliGRAM(s) IV Push once  multivitamin 1 Tablet(s) Oral daily  pantoprazole  Injectable 40 milliGRAM(s) IV Push two times a day  polyethylene glycol 3350 17 Gram(s) Oral daily  senna 2 Tablet(s) Oral at bedtime    MEDICATIONS  (PRN):  acetaminophen     Tablet .. 650 milliGRAM(s) Oral every 6 hours PRN Temp greater or equal to 38C (100.4F), Mild Pain (1 - 3)  ondansetron Injectable 4 milliGRAM(s) IV Push every 8 hours PRN Nausea and/or Vomiting    CAPILLARY BLOOD GLUCOSE        I&O's Summary      PHYSICAL EXAM:    Vital Signs Last 24 Hrs  T(C): 37.1 (06 Oct 2023 16:20), Max: 37.1 (05 Oct 2023 20:36)  T(F): 98.8 (06 Oct 2023 16:20), Max: 98.8 (05 Oct 2023 20:36)  HR: 88 (06 Oct 2023 16:20) (71 - 94)  BP: 106/68 (06 Oct 2023 16:20) (106/68 - 120/79)  BP(mean): --  RR: 16 (06 Oct 2023 16:20) (16 - 18)  SpO2: 99% (06 Oct 2023 16:20) (97% - 99%)    Parameters below as of 06 Oct 2023 16:20  Patient On (Oxygen Delivery Method): room air        GEN: NAD; A and O x 3, pallor  LUNGS: CTA B/L  HEART: S1 S2  ABDOMEN: soft, non-tender, non-distended, + BS  EXTREMITIES: no edema    LABS:                                   8.8    6.85  )-----------( 244      ( 06 Oct 2023 06:37 )             27.4       10-06    141  |  110<H>  |  14  ----------------------------<  89  4.3   |  26  |  0.54    Ca    8.1<L>      06 Oct 2023 06:37  Phos  3.3     10-06  Mg     2.1     10-06    TPro  5.7<L>  /  Alb  x   /  TBili  x   /  DBili  x   /  AST  x   /  ALT  x   /  AlkPhos  x   10-05      Urinalysis Basic - ( 05 Oct 2023 07:28 )    Color: x / Appearance: x / SG: x / pH: x  Gluc: 86 mg/dL / Ketone: x  / Bili: x / Urobili: x   Blood: x / Protein: x / Nitrite: x   Leuk Esterase: x / RBC: x / WBC x   Sq Epi: x / Non Sq Epi: x / Bacteria: x

## 2023-10-06 NOTE — PROGRESS NOTE ADULT - SUBJECTIVE AND OBJECTIVE BOX
[   ] ICU                                          [   ] CCU                                      [ X  ] Medical Floor    Patient is a 53 year old female with anemia. GI consulted to evaluate.        53-year-old female with history of migraine headaches, anxiety,  anemia since having gastric bypass 11 months ago presents after episode of LOC.  Per the  patient was complaining of migraine headache earlier in the day when they went to [employer] and when they were leaving [employer] she felt weak and passed out he was able to hold onto her denies any head trauma at that time. Patient denies abdominal pain, nausea, vomiting, hematemesis, hematochezia, melena, fever, chills, chest pain, SOB, cough, hematuria, dysuria or diarrhea.      Patient is comfortable. No new complaints reported, No abdominal pain, N/V, hematemesis, hematochezia, melena, fever, chills, chest pain, SOB, cough or diarrhea reported.      PAIN MANAGEMENT:  Pain Scale:                0 /10  Pain Location:        Prior Colonoscopy:  4 years ago      PAST MEDICAL HISTORY    HTN (hypertension)     Migraine    Anxiety disorder    COVID-19        PAST SURGICAL HISTORY     LASIK surgery        Allergies    No Known Allergies    Intolerances  None         MEDICATIONS  (STANDING):  amitriptyline 10 milliGRAM(s) Oral daily  multivitamin 1 Tablet(s) Oral daily  pantoprazole  Injectable 40 milliGRAM(s) IV Push two times a day    MEDICATIONS  (PRN):  acetaminophen     Tablet .. 650 milliGRAM(s) Oral every 6 hours PRN Temp greater or equal to 38C (100.4F), Mild Pain (1 - 3)  ondansetron Injectable 4 milliGRAM(s) IV Push every 8 hours PRN Nausea and/or Vomiting      SOCIAL HISTORY  Advanced Directives:       [ X ] Full Code       [  ] DNR  Marital Status:         [  ] M      [ X ] S      [  ] D       [  ] W  Children:       [ X ] Yes      [  ] No  Occupation:        [  ] Employed       [ X ] Unemployed       [  ] Retired  Diet:       [ X ] Regular       [  ] PEG feeding          [  ] NG tube feeding  Drug Use:           [ X ] Patient denied          [  ] Yes  Alcohol:           [ X ] No             [  ] Yes (socially)         [  ] Yes (chronic)  Tobacco:           [  ] Yes           [ X ] No      FAMILY HISTORY  [ X ] Heart Disease            [ X ] Diabetes             [ x ] HTN             [  ] Colon Cancer             [  ] Stomach Cancer              [  ] Pancreatic Cancer        VITALS  Vital Signs Last 24 Hrs  T(C): 36.8 (06 Oct 2023 05:20), Max: 37.1 (05 Oct 2023 20:36)  T(F): 98.2 (06 Oct 2023 05:20), Max: 98.8 (05 Oct 2023 20:36)  HR: 78 (06 Oct 2023 05:20) (74 - 94)  BP: 116/81 (06 Oct 2023 05:20) (109/63 - 132/87)     RR: 18 (06 Oct 2023 05:20) (16 - 18)  SpO2: 97% (06 Oct 2023 05:20) (97% - 99%)    Parameters below as of 06 Oct 2023 05:20  Patient On (Oxygen Delivery Method): room air       MEDICATIONS  (STANDING):  amitriptyline 25 milliGRAM(s) Oral at bedtime  diphenhydrAMINE Elixir 25 milliGRAM(s) Oral once  influenza   Vaccine 0.5 milliLiter(s) IntraMuscular once  metoclopramide Injectable 10 milliGRAM(s) IV Push once  multivitamin 1 Tablet(s) Oral daily  polyethylene glycol 3350 17 Gram(s) Oral daily  senna 2 Tablet(s) Oral at bedtime    MEDICATIONS  (PRN):  ondansetron Injectable 4 milliGRAM(s) IV Push every 8 hours PRN Nausea and/or Vomiting                            8.8    6.85  )-----------( 244      ( 06 Oct 2023 06:37 )             27.4       10-06    141  |  110<H>  |  14  ----------------------------<  89  4.3   |  26  |  0.54    Ca    8.1<L>      06 Oct 2023 06:37  Phos  3.3     10-06  Mg     2.1     10-06    TPro  5.2<L>  /  Alb  2.6<L>  /  TBili  0.5  /  DBili  x   /  AST  15  /  ALT  22  /  AlkPhos  54  10-05

## 2023-10-06 NOTE — PROGRESS NOTE ADULT - REASON FOR ADMISSION
symptomatic anemia

## 2023-10-06 NOTE — PROGRESS NOTE ADULT - NEGATIVE OPHTHALMOLOGIC SYMPTOMS
no diplopia/no photophobia/no lacrimation L/no lacrimation R/no blurred vision L/no blurred vision R/no discharge L/no discharge R/no pain L/no pain R/no irritation L/no irritation R/no loss of vision L
no diplopia/no photophobia/no lacrimation L/no lacrimation R/no blurred vision L/no blurred vision R/no discharge L/no discharge R/no pain L/no pain R/no irritation L/no irritation R/no loss of vision L

## 2023-10-06 NOTE — PROGRESS NOTE ADULT - NEGATIVE PSYCHIATRIC SYMPTOMS
no suicidal ideation/no depression/no insomnia/no memory loss/no paranoia/no mood swings/no agitation
no suicidal ideation/no depression/no insomnia/no memory loss/no paranoia/no mood swings/no agitation

## 2023-10-06 NOTE — PROGRESS NOTE ADULT - NEGATIVE GENERAL GENITOURINARY SYMPTOMS
no hematuria/no renal colic/no flank pain L/no flank pain R
no hematuria/no renal colic/no flank pain L/no flank pain R

## 2023-10-06 NOTE — PROGRESS NOTE ADULT - RESPIRATORY
clear to auscultation bilaterally/no wheezes/no rales/no rhonchi/no respiratory distress/no use of accessory muscles/airway patent/breath sounds equal/good air movement/respirations non-labored
clear to auscultation bilaterally/no wheezes/no rales/no rhonchi/no respiratory distress/no use of accessory muscles/airway patent/breath sounds equal/good air movement/respirations non-labored

## 2023-10-06 NOTE — PROGRESS NOTE ADULT - ASSESSMENT
Assessment and Recommendation:   · Assessment	  53-year-old female with history of migraine headaches, anxiety,  anemia since having gastric bypass 11 months ago presents after episode of LOC.  Per the  patient was complaining of migraine headache earlier in the day when they went to [employer] and when they were leaving [employer] she felt weak and passed out he was able to hold onto her denies any head trauma at that time.  Reports that when she fainted he did notice some shaking activity and she urinated on self, says she woke up a few minutes later immediately at her baseline, and asked what was going on. Says that the same thing happened while they were in the ED.  Deny tongue bite in either instance.     Denies any chest pain, recent illness, bloody or black stools.  Patient denies any transfusions in the past for her anemia. Says last colonoscopy was 4 years ago, and says she recently had endoscopy as part of a procedure to evaluate for gallstones?    #Microcytic Anemia  p/w syncope, symptomatic anemia  ongoing menses, +menorrhagia  eval by GYN as outpt, no medication, no fibroids as per pt  Last colonoscopy 4 years ago  Hx gastric bypass 11 months ago  Hgb=6.1 MCV=75  Hgb in 12/2021 was 11.1, most recent Hgb on 9/13/23 was 9.5 on pt's [facility] norma  Plt wnl  Cr nl  Albumin 2.8  Ferritin=5, Sat 5%, TIBC nl  Head CT (-)  EEG nl  Rec's:  etiology menorrhagia, gastric bypass, nutritional +/- GIB  -PRBC transfusion for Hgb <7.0, s/p 2 units and Hgb improved to 8.8 today   -tx with  venofer 200mg IV qd , ordered 3 doses  (if pt ready for d/c prior to completion can continue as outpt)  -Daily CBC  -Nutrition consult, hypoalbuminemia  -GI consult appreciated, w/u as outpt  -Neuro consult appreciated  -anemia panel shows RONALD, Retic and LDH are nl  -advised pt to also f/u with Her GYN upon d/c      #VTE Prophylaxis      f/u with us at [facility] for management of anemia  as an outpt  call with questions [phone]

## 2023-10-06 NOTE — DISCHARGE NOTE NURSING/CASE MANAGEMENT/SOCIAL WORK - NSDCPEFALRISK_GEN_ALL_CORE
For information on Fall & Injury Prevention, visit: [email or web address] OR  [email or web address] OR  https://www.cdc.gov/steadi/patient.html

## 2023-10-06 NOTE — PROGRESS NOTE ADULT - NEGATIVE SKIN SYMPTOMS
no rash/no itching/no brittle nails/no pitted nails/no hair loss
no rash/no itching/no brittle nails/no pitted nails/no hair loss

## 2023-10-06 NOTE — PROGRESS NOTE ADULT - NEGATIVE ENMT SYMPTOMS
no hearing difficulty/no ear pain/no tinnitus/no vertigo/no nasal discharge/no nasal obstruction/no dry mouth/no throat pain/no dysphagia
no hearing difficulty/no ear pain/no tinnitus/no vertigo/no nasal discharge/no nasal obstruction/no dry mouth/no throat pain/no dysphagia

## 2023-10-06 NOTE — PROGRESS NOTE ADULT - ASSESSMENT
1. Iron deficiency anemia  2. No evidence of acute GI bleeding  3. R/o chronic GI bleeding  4. Constipation    Suggestions:    1. Monitor H/H  2. Transfuse PRBC as needed  3. Protonix daily  4. Avoid NSAID  5. Anemia work up  6. Iron supplement   7. Hematology follow up  8. Check stool for occult blood  9. Further GI work up out patient  10. DVT prophylaxis

## 2023-10-06 NOTE — DISCHARGE NOTE NURSING/CASE MANAGEMENT/SOCIAL WORK - PATIENT PORTAL LINK FT
You can access the FollowMyHealth Patient Portal offered by [facility] by registering at the following website: [email or web address]. By joining "[employer]"’s FollowMyHealth portal, you will also be able to view your health information using other applications (apps) compatible with our system.

## 2023-10-07 LAB
% ALBUMIN: 57.8 % — SIGNIFICANT CHANGE UP
% ALPHA 1: 4.7 % — SIGNIFICANT CHANGE UP
% ALPHA 2: 9.1 % — SIGNIFICANT CHANGE UP
% BETA: 14.5 % — SIGNIFICANT CHANGE UP
% GAMMA: 13.9 % — SIGNIFICANT CHANGE UP
ALBUMIN SERPL ELPH-MCNC: 3.3 G/DL — LOW (ref 3.6–5.5)
ALBUMIN/GLOB SERPL ELPH: 1.4 RATIO — SIGNIFICANT CHANGE UP
ALPHA1 GLOB SERPL ELPH-MCNC: 0.3 G/DL — SIGNIFICANT CHANGE UP (ref 0.1–0.4)
ALPHA2 GLOB SERPL ELPH-MCNC: 0.5 G/DL — SIGNIFICANT CHANGE UP (ref 0.5–1)
B-GLOBULIN SERPL ELPH-MCNC: 0.8 G/DL — SIGNIFICANT CHANGE UP (ref 0.5–1)
GAMMA GLOBULIN: 0.8 G/DL — SIGNIFICANT CHANGE UP (ref 0.6–1.6)
PROT PATTERN SERPL ELPH-IMP: SIGNIFICANT CHANGE UP

## 2023-10-09 ENCOUNTER — EMERGENCY (EMERGENCY)
Facility: HOSPITAL | Age: 53
LOS: 1 days | Discharge: HOME | End: 2023-10-09
Attending: EMERGENCY MEDICINE
Payer: MEDICAID

## 2023-10-09 VITALS
OXYGEN SATURATION: 99 % | HEART RATE: 95 BPM | RESPIRATION RATE: 18 BRPM | SYSTOLIC BLOOD PRESSURE: 119 MMHG | TEMPERATURE: 99 F | DIASTOLIC BLOOD PRESSURE: 77 MMHG

## 2023-10-09 VITALS
TEMPERATURE: 99 F | SYSTOLIC BLOOD PRESSURE: 130 MMHG | RESPIRATION RATE: 18 BRPM | WEIGHT: 125 LBS | DIASTOLIC BLOOD PRESSURE: 85 MMHG | HEART RATE: 96 BPM | HEIGHT: 59 IN

## 2023-10-09 DIAGNOSIS — Z98.89 OTHER SPECIFIED POSTPROCEDURAL STATES: Chronic | ICD-10-CM

## 2023-10-09 LAB
ALBUMIN SERPL ELPH-MCNC: 3.4 G/DL — LOW (ref 3.5–5)
ALP SERPL-CCNC: 76 U/L — SIGNIFICANT CHANGE UP (ref 40–120)
ALT FLD-CCNC: 50 U/L DA — SIGNIFICANT CHANGE UP (ref 10–60)
ANION GAP SERPL CALC-SCNC: 5 MMOL/L — SIGNIFICANT CHANGE UP (ref 5–17)
AST SERPL-CCNC: 39 U/L — SIGNIFICANT CHANGE UP (ref 10–40)
BASOPHILS # BLD AUTO: 0.04 K/UL — SIGNIFICANT CHANGE UP (ref 0–0.2)
BASOPHILS NFR BLD AUTO: 0.5 % — SIGNIFICANT CHANGE UP (ref 0–2)
BILIRUB SERPL-MCNC: 0.3 MG/DL — SIGNIFICANT CHANGE UP (ref 0.2–1.2)
BUN SERPL-MCNC: 15 MG/DL — SIGNIFICANT CHANGE UP (ref 7–18)
CALCIUM SERPL-MCNC: 8.7 MG/DL — SIGNIFICANT CHANGE UP (ref 8.4–10.5)
CHLORIDE SERPL-SCNC: 109 MMOL/L — HIGH (ref 96–108)
CO2 SERPL-SCNC: 26 MMOL/L — SIGNIFICANT CHANGE UP (ref 22–31)
CREAT SERPL-MCNC: 0.5 MG/DL — SIGNIFICANT CHANGE UP (ref 0.5–1.3)
EGFR: 111 ML/MIN/1.73M2 — SIGNIFICANT CHANGE UP
EOSINOPHIL # BLD AUTO: 0.1 K/UL — SIGNIFICANT CHANGE UP (ref 0–0.5)
EOSINOPHIL NFR BLD AUTO: 1.3 % — SIGNIFICANT CHANGE UP (ref 0–6)
GLUCOSE SERPL-MCNC: 98 MG/DL — SIGNIFICANT CHANGE UP (ref 70–99)
HCG SERPL-ACNC: <1 MIU/ML — SIGNIFICANT CHANGE UP
HCT VFR BLD CALC: 30.5 % — LOW (ref 34.5–45)
HGB BLD-MCNC: 9.7 G/DL — LOW (ref 11.5–15.5)
IMM GRANULOCYTES NFR BLD AUTO: 0.1 % — SIGNIFICANT CHANGE UP (ref 0–0.9)
LYMPHOCYTES # BLD AUTO: 2.58 K/UL — SIGNIFICANT CHANGE UP (ref 1–3.3)
LYMPHOCYTES # BLD AUTO: 34.6 % — SIGNIFICANT CHANGE UP (ref 13–44)
MAGNESIUM SERPL-MCNC: 2 MG/DL — SIGNIFICANT CHANGE UP (ref 1.6–2.6)
MCHC RBC-ENTMCNC: 25.1 PG — LOW (ref 27–34)
MCHC RBC-ENTMCNC: 31.8 GM/DL — LOW (ref 32–36)
MCV RBC AUTO: 78.8 FL — LOW (ref 80–100)
MONOCYTES # BLD AUTO: 0.45 K/UL — SIGNIFICANT CHANGE UP (ref 0–0.9)
MONOCYTES NFR BLD AUTO: 6 % — SIGNIFICANT CHANGE UP (ref 2–14)
NEUTROPHILS # BLD AUTO: 4.27 K/UL — SIGNIFICANT CHANGE UP (ref 1.8–7.4)
NEUTROPHILS NFR BLD AUTO: 57.5 % — SIGNIFICANT CHANGE UP (ref 43–77)
NRBC # BLD: 0 /100 WBCS — SIGNIFICANT CHANGE UP (ref 0–0)
PLATELET # BLD AUTO: 351 K/UL — SIGNIFICANT CHANGE UP (ref 150–400)
POTASSIUM SERPL-MCNC: 4.4 MMOL/L — SIGNIFICANT CHANGE UP (ref 3.5–5.3)
POTASSIUM SERPL-SCNC: 4.4 MMOL/L — SIGNIFICANT CHANGE UP (ref 3.5–5.3)
PROT SERPL-MCNC: 6.8 G/DL — SIGNIFICANT CHANGE UP (ref 6–8.3)
RBC # BLD: 3.87 M/UL — SIGNIFICANT CHANGE UP (ref 3.8–5.2)
RBC # FLD: 17.6 % — HIGH (ref 10.3–14.5)
SODIUM SERPL-SCNC: 140 MMOL/L — SIGNIFICANT CHANGE UP (ref 135–145)
WBC # BLD: 7.45 K/UL — SIGNIFICANT CHANGE UP (ref 3.8–10.5)
WBC # FLD AUTO: 7.45 K/UL — SIGNIFICANT CHANGE UP (ref 3.8–10.5)

## 2023-10-09 PROCEDURE — 96374 THER/PROPH/DIAG INJ IV PUSH: CPT

## 2023-10-09 PROCEDURE — 36415 COLL VENOUS BLD VENIPUNCTURE: CPT

## 2023-10-09 PROCEDURE — 99284 EMERGENCY DEPT VISIT MOD MDM: CPT

## 2023-10-09 PROCEDURE — 83735 ASSAY OF MAGNESIUM: CPT

## 2023-10-09 PROCEDURE — 84702 CHORIONIC GONADOTROPIN TEST: CPT

## 2023-10-09 PROCEDURE — 82962 GLUCOSE BLOOD TEST: CPT

## 2023-10-09 PROCEDURE — 96375 TX/PRO/DX INJ NEW DRUG ADDON: CPT

## 2023-10-09 PROCEDURE — 93005 ELECTROCARDIOGRAM TRACING: CPT

## 2023-10-09 PROCEDURE — 80053 COMPREHEN METABOLIC PANEL: CPT

## 2023-10-09 PROCEDURE — 85025 COMPLETE CBC W/AUTO DIFF WBC: CPT

## 2023-10-09 PROCEDURE — 99284 EMERGENCY DEPT VISIT MOD MDM: CPT | Mod: 25

## 2023-10-09 RX ORDER — ACETAMINOPHEN 500 MG
1000 TABLET ORAL ONCE
Refills: 0 | Status: COMPLETED | OUTPATIENT
Start: 2023-10-09 | End: 2023-10-09

## 2023-10-09 RX ORDER — KETOROLAC TROMETHAMINE 30 MG/ML
30 SYRINGE (ML) INJECTION ONCE
Refills: 0 | Status: DISCONTINUED | OUTPATIENT
Start: 2023-10-09 | End: 2023-10-09

## 2023-10-09 RX ORDER — DIPHENHYDRAMINE HCL 50 MG
25 CAPSULE ORAL ONCE
Refills: 0 | Status: COMPLETED | OUTPATIENT
Start: 2023-10-09 | End: 2023-10-09

## 2023-10-09 RX ADMIN — Medication 400 MILLIGRAM(S): at 14:25

## 2023-10-09 RX ADMIN — Medication 25 MILLIGRAM(S): at 14:25

## 2023-10-09 RX ADMIN — Medication 30 MILLIGRAM(S): at 14:26

## 2023-10-09 NOTE — ED PROVIDER NOTE - OBJECTIVE STATEMENT
53-year-old female Korean-speaking  #[id number].  Patient with past medical history of migraine headaches and anemia with recent admission 10/4-10/6 for symptomatic anemia in need of blood transfusion.  As per patient today she was at home she says she began to feel headache and dizziness and weakness which is similar to how she felt last week when she was found to be anemic.  Patient says symptoms associated with headache.  As per patient she has had a headache for 1 year daily.  She had it during her admission and was told to follow-up with a neurologist however her appointment is not for another 2 weeks.  Patient was concerned and thus came to the ER for further evaluation

## 2023-10-09 NOTE — ED ADULT NURSE NOTE - OBJECTIVE STATEMENT
axox3 ,nad , headache for few years ,  denies any dizziness at present time , no nausea , no vomiting 24-Nov-2019 16:57

## 2023-10-09 NOTE — ED PROVIDER NOTE - NSFOLLOWUPCLINICS_GEN_ALL_ED_FT
[facility]  Neurology  [address] [place] [address]  Phone: [phone]  Fax: [phone]  Follow Up Time: 7-10 Days

## 2023-10-09 NOTE — ED PROVIDER NOTE - PATIENT PORTAL LINK FT
You can access the FollowMyHealth Patient Portal offered by [facility] by registering at the following website: [email or web address]. By joining [employer]’s FollowMyHealth portal, you will also be able to view your health information using other applications (apps) compatible with our system.

## 2023-10-09 NOTE — ED PROVIDER NOTE - CLINICAL SUMMARY MEDICAL DECISION MAKING FREE TEXT BOX
53-year-old female history of migraine headaches and anemia presents with dizziness and headache.  Patient says she felt similar to that she felt last week and was concerned that her anemia might have returned.  No vomiting no diarrhea no chest pain no fever no shortness of breath.  Exam unremarkable.  Will check labs supportive care reassess

## 2023-10-19 ENCOUNTER — EMERGENCY (EMERGENCY)
Facility: HOSPITAL | Age: 53
LOS: 1 days | Discharge: HOME | End: 2023-10-19
Attending: STUDENT IN AN ORGANIZED HEALTH CARE EDUCATION/TRAINING PROGRAM
Payer: MEDICAID

## 2023-10-19 VITALS
SYSTOLIC BLOOD PRESSURE: 132 MMHG | TEMPERATURE: 98 F | OXYGEN SATURATION: 97 % | RESPIRATION RATE: 18 BRPM | DIASTOLIC BLOOD PRESSURE: 81 MMHG | WEIGHT: 129.85 LBS | HEART RATE: 92 BPM | HEIGHT: 59 IN

## 2023-10-19 VITALS
OXYGEN SATURATION: 98 % | SYSTOLIC BLOOD PRESSURE: 109 MMHG | HEART RATE: 94 BPM | DIASTOLIC BLOOD PRESSURE: 57 MMHG | TEMPERATURE: 99 F | RESPIRATION RATE: 18 BRPM

## 2023-10-19 DIAGNOSIS — Z98.89 OTHER SPECIFIED POSTPROCEDURAL STATES: Chronic | ICD-10-CM

## 2023-10-19 LAB
ALBUMIN SERPL ELPH-MCNC: 3.4 G/DL — LOW (ref 3.5–5)
ALBUMIN SERPL ELPH-MCNC: 3.4 G/DL — LOW (ref 3.5–5)
ALP SERPL-CCNC: 68 U/L — SIGNIFICANT CHANGE UP (ref 40–120)
ALP SERPL-CCNC: 68 U/L — SIGNIFICANT CHANGE UP (ref 40–120)
ALT FLD-CCNC: 37 U/L DA — SIGNIFICANT CHANGE UP (ref 10–60)
ALT FLD-CCNC: 37 U/L DA — SIGNIFICANT CHANGE UP (ref 10–60)
ANION GAP SERPL CALC-SCNC: 3 MMOL/L — LOW (ref 5–17)
ANION GAP SERPL CALC-SCNC: 3 MMOL/L — LOW (ref 5–17)
APTT BLD: 33.3 SEC — SIGNIFICANT CHANGE UP (ref 24.5–35.6)
APTT BLD: 33.3 SEC — SIGNIFICANT CHANGE UP (ref 24.5–35.6)
AST SERPL-CCNC: 20 U/L — SIGNIFICANT CHANGE UP (ref 10–40)
AST SERPL-CCNC: 20 U/L — SIGNIFICANT CHANGE UP (ref 10–40)
BASOPHILS # BLD AUTO: 0.03 K/UL — SIGNIFICANT CHANGE UP (ref 0–0.2)
BASOPHILS # BLD AUTO: 0.03 K/UL — SIGNIFICANT CHANGE UP (ref 0–0.2)
BASOPHILS NFR BLD AUTO: 0.6 % — SIGNIFICANT CHANGE UP (ref 0–2)
BASOPHILS NFR BLD AUTO: 0.6 % — SIGNIFICANT CHANGE UP (ref 0–2)
BILIRUB SERPL-MCNC: 0.2 MG/DL — SIGNIFICANT CHANGE UP (ref 0.2–1.2)
BILIRUB SERPL-MCNC: 0.2 MG/DL — SIGNIFICANT CHANGE UP (ref 0.2–1.2)
BLD GP AB SCN SERPL QL: SIGNIFICANT CHANGE UP
BLD GP AB SCN SERPL QL: SIGNIFICANT CHANGE UP
BUN SERPL-MCNC: 13 MG/DL — SIGNIFICANT CHANGE UP (ref 7–18)
BUN SERPL-MCNC: 13 MG/DL — SIGNIFICANT CHANGE UP (ref 7–18)
CALCIUM SERPL-MCNC: 8.8 MG/DL — SIGNIFICANT CHANGE UP (ref 8.4–10.5)
CALCIUM SERPL-MCNC: 8.8 MG/DL — SIGNIFICANT CHANGE UP (ref 8.4–10.5)
CHLORIDE SERPL-SCNC: 111 MMOL/L — HIGH (ref 96–108)
CHLORIDE SERPL-SCNC: 111 MMOL/L — HIGH (ref 96–108)
CO2 SERPL-SCNC: 28 MMOL/L — SIGNIFICANT CHANGE UP (ref 22–31)
CO2 SERPL-SCNC: 28 MMOL/L — SIGNIFICANT CHANGE UP (ref 22–31)
CREAT SERPL-MCNC: 0.49 MG/DL — LOW (ref 0.5–1.3)
CREAT SERPL-MCNC: 0.49 MG/DL — LOW (ref 0.5–1.3)
EGFR: 112 ML/MIN/1.73M2 — SIGNIFICANT CHANGE UP
EGFR: 112 ML/MIN/1.73M2 — SIGNIFICANT CHANGE UP
EOSINOPHIL # BLD AUTO: 0.04 K/UL — SIGNIFICANT CHANGE UP (ref 0–0.5)
EOSINOPHIL # BLD AUTO: 0.04 K/UL — SIGNIFICANT CHANGE UP (ref 0–0.5)
EOSINOPHIL NFR BLD AUTO: 0.7 % — SIGNIFICANT CHANGE UP (ref 0–6)
EOSINOPHIL NFR BLD AUTO: 0.7 % — SIGNIFICANT CHANGE UP (ref 0–6)
GLUCOSE SERPL-MCNC: 103 MG/DL — HIGH (ref 70–99)
GLUCOSE SERPL-MCNC: 103 MG/DL — HIGH (ref 70–99)
HCG SERPL-ACNC: <1 MIU/ML — SIGNIFICANT CHANGE UP
HCG SERPL-ACNC: <1 MIU/ML — SIGNIFICANT CHANGE UP
HCT VFR BLD CALC: 28.4 % — LOW (ref 34.5–45)
HCT VFR BLD CALC: 28.4 % — LOW (ref 34.5–45)
HGB BLD-MCNC: 8.7 G/DL — LOW (ref 11.5–15.5)
HGB BLD-MCNC: 8.7 G/DL — LOW (ref 11.5–15.5)
IMM GRANULOCYTES NFR BLD AUTO: 0 % — SIGNIFICANT CHANGE UP (ref 0–0.9)
IMM GRANULOCYTES NFR BLD AUTO: 0 % — SIGNIFICANT CHANGE UP (ref 0–0.9)
INR BLD: 1.04 RATIO — SIGNIFICANT CHANGE UP (ref 0.85–1.18)
INR BLD: 1.04 RATIO — SIGNIFICANT CHANGE UP (ref 0.85–1.18)
LYMPHOCYTES # BLD AUTO: 1.72 K/UL — SIGNIFICANT CHANGE UP (ref 1–3.3)
LYMPHOCYTES # BLD AUTO: 1.72 K/UL — SIGNIFICANT CHANGE UP (ref 1–3.3)
LYMPHOCYTES # BLD AUTO: 31.9 % — SIGNIFICANT CHANGE UP (ref 13–44)
LYMPHOCYTES # BLD AUTO: 31.9 % — SIGNIFICANT CHANGE UP (ref 13–44)
MCHC RBC-ENTMCNC: 25 PG — LOW (ref 27–34)
MCHC RBC-ENTMCNC: 25 PG — LOW (ref 27–34)
MCHC RBC-ENTMCNC: 30.6 GM/DL — LOW (ref 32–36)
MCHC RBC-ENTMCNC: 30.6 GM/DL — LOW (ref 32–36)
MCV RBC AUTO: 81.6 FL — SIGNIFICANT CHANGE UP (ref 80–100)
MCV RBC AUTO: 81.6 FL — SIGNIFICANT CHANGE UP (ref 80–100)
MONOCYTES # BLD AUTO: 0.32 K/UL — SIGNIFICANT CHANGE UP (ref 0–0.9)
MONOCYTES # BLD AUTO: 0.32 K/UL — SIGNIFICANT CHANGE UP (ref 0–0.9)
MONOCYTES NFR BLD AUTO: 5.9 % — SIGNIFICANT CHANGE UP (ref 2–14)
MONOCYTES NFR BLD AUTO: 5.9 % — SIGNIFICANT CHANGE UP (ref 2–14)
NEUTROPHILS # BLD AUTO: 3.28 K/UL — SIGNIFICANT CHANGE UP (ref 1.8–7.4)
NEUTROPHILS # BLD AUTO: 3.28 K/UL — SIGNIFICANT CHANGE UP (ref 1.8–7.4)
NEUTROPHILS NFR BLD AUTO: 60.9 % — SIGNIFICANT CHANGE UP (ref 43–77)
NEUTROPHILS NFR BLD AUTO: 60.9 % — SIGNIFICANT CHANGE UP (ref 43–77)
NRBC # BLD: 0 /100 WBCS — SIGNIFICANT CHANGE UP (ref 0–0)
NRBC # BLD: 0 /100 WBCS — SIGNIFICANT CHANGE UP (ref 0–0)
PLATELET # BLD AUTO: 302 K/UL — SIGNIFICANT CHANGE UP (ref 150–400)
PLATELET # BLD AUTO: 302 K/UL — SIGNIFICANT CHANGE UP (ref 150–400)
POTASSIUM SERPL-MCNC: 3.8 MMOL/L — SIGNIFICANT CHANGE UP (ref 3.5–5.3)
POTASSIUM SERPL-MCNC: 3.8 MMOL/L — SIGNIFICANT CHANGE UP (ref 3.5–5.3)
POTASSIUM SERPL-SCNC: 3.8 MMOL/L — SIGNIFICANT CHANGE UP (ref 3.5–5.3)
POTASSIUM SERPL-SCNC: 3.8 MMOL/L — SIGNIFICANT CHANGE UP (ref 3.5–5.3)
PROT SERPL-MCNC: 6.7 G/DL — SIGNIFICANT CHANGE UP (ref 6–8.3)
PROT SERPL-MCNC: 6.7 G/DL — SIGNIFICANT CHANGE UP (ref 6–8.3)
PROTHROM AB SERPL-ACNC: 11.8 SEC — SIGNIFICANT CHANGE UP (ref 9.5–13)
PROTHROM AB SERPL-ACNC: 11.8 SEC — SIGNIFICANT CHANGE UP (ref 9.5–13)
RBC # BLD: 3.48 M/UL — LOW (ref 3.8–5.2)
RBC # BLD: 3.48 M/UL — LOW (ref 3.8–5.2)
RBC # FLD: 18.5 % — HIGH (ref 10.3–14.5)
RBC # FLD: 18.5 % — HIGH (ref 10.3–14.5)
SODIUM SERPL-SCNC: 142 MMOL/L — SIGNIFICANT CHANGE UP (ref 135–145)
SODIUM SERPL-SCNC: 142 MMOL/L — SIGNIFICANT CHANGE UP (ref 135–145)
WBC # BLD: 5.39 K/UL — SIGNIFICANT CHANGE UP (ref 3.8–10.5)
WBC # BLD: 5.39 K/UL — SIGNIFICANT CHANGE UP (ref 3.8–10.5)
WBC # FLD AUTO: 5.39 K/UL — SIGNIFICANT CHANGE UP (ref 3.8–10.5)
WBC # FLD AUTO: 5.39 K/UL — SIGNIFICANT CHANGE UP (ref 3.8–10.5)

## 2023-10-19 PROCEDURE — 80053 COMPREHEN METABOLIC PANEL: CPT

## 2023-10-19 PROCEDURE — 99284 EMERGENCY DEPT VISIT MOD MDM: CPT

## 2023-10-19 PROCEDURE — 36415 COLL VENOUS BLD VENIPUNCTURE: CPT

## 2023-10-19 PROCEDURE — 84702 CHORIONIC GONADOTROPIN TEST: CPT

## 2023-10-19 PROCEDURE — 76830 TRANSVAGINAL US NON-OB: CPT | Mod: 26

## 2023-10-19 PROCEDURE — 86900 BLOOD TYPING SEROLOGIC ABO: CPT

## 2023-10-19 PROCEDURE — 85025 COMPLETE CBC W/AUTO DIFF WBC: CPT

## 2023-10-19 PROCEDURE — 96374 THER/PROPH/DIAG INJ IV PUSH: CPT

## 2023-10-19 PROCEDURE — 76856 US EXAM PELVIC COMPLETE: CPT | Mod: 26

## 2023-10-19 PROCEDURE — 96375 TX/PRO/DX INJ NEW DRUG ADDON: CPT

## 2023-10-19 PROCEDURE — 85730 THROMBOPLASTIN TIME PARTIAL: CPT

## 2023-10-19 PROCEDURE — 86850 RBC ANTIBODY SCREEN: CPT

## 2023-10-19 PROCEDURE — 99284 EMERGENCY DEPT VISIT MOD MDM: CPT | Mod: 25

## 2023-10-19 PROCEDURE — 76856 US EXAM PELVIC COMPLETE: CPT

## 2023-10-19 PROCEDURE — 85610 PROTHROMBIN TIME: CPT

## 2023-10-19 PROCEDURE — 76830 TRANSVAGINAL US NON-OB: CPT

## 2023-10-19 PROCEDURE — 86901 BLOOD TYPING SEROLOGIC RH(D): CPT

## 2023-10-19 RX ORDER — SODIUM CHLORIDE 9 MG/ML
500 INJECTION INTRAMUSCULAR; INTRAVENOUS; SUBCUTANEOUS ONCE
Refills: 0 | Status: COMPLETED | OUTPATIENT
Start: 2023-10-19 | End: 2023-10-19

## 2023-10-19 RX ORDER — ACETAMINOPHEN 500 MG
1000 TABLET ORAL ONCE
Refills: 0 | Status: COMPLETED | OUTPATIENT
Start: 2023-10-19 | End: 2023-10-19

## 2023-10-19 RX ORDER — METOCLOPRAMIDE HCL 10 MG
10 TABLET ORAL ONCE
Refills: 0 | Status: COMPLETED | OUTPATIENT
Start: 2023-10-19 | End: 2023-10-19

## 2023-10-19 RX ADMIN — SODIUM CHLORIDE 1000 MILLILITER(S): 9 INJECTION INTRAMUSCULAR; INTRAVENOUS; SUBCUTANEOUS at 15:37

## 2023-10-19 RX ADMIN — Medication 10 MILLIGRAM(S): at 15:37

## 2023-10-19 RX ADMIN — Medication 400 MILLIGRAM(S): at 15:37

## 2023-10-19 NOTE — ED PROVIDER NOTE - CLINICAL SUMMARY MEDICAL DECISION MAKING FREE TEXT BOX
Louis: 53-year-old female with past medical history migraines, anxiety, anemia, menorrhagia presents with multiple medical complaints.  Patient states her menstrual period started 3 days ago, states bleeding was initially heavy with approximately 5-6 pads per day with clots, reports improved bleeding at this time and states she used approximately 1-2 pads since this morning.  Patient also reports her typical migraine symptoms including headache, photophobia, vision changes, and nausea.  Patient states she was seen by OB/GYN earlier today and sent to the emergency department for further evaluation.  Denies any fevers, chest pain, shortness of breath, abdominal pain, vomiting, diarrhea, dysuria, bloody stools, black tarry stools, numbness, focal weakness, rash.  Denies any aspirin or anticoagulation use.  Physical exam per above. Abdomen nontender. Neuro exam nonfocal. Will obtain labs r/o anemia, imaging, provide supportive treatment with dispo pending workup.

## 2023-10-19 NOTE — ED PROVIDER NOTE - PATIENT PORTAL LINK FT
You can access the FollowMyHealth Patient Portal offered by [facility] by registering at the following website: [email or web address]. By joining [employer]’s FollowMyHealth portal, you will also be able to view your health information using other applications (apps) compatible with our system.

## 2023-10-19 NOTE — ED PROVIDER NOTE - NSFOLLOWUPINSTRUCTIONS_ED_ALL_ED_FT
Your labs and imaging did not show any emergent findings. Your US did show cysts in the right and left ovary. You should follow up with your gynecologist to determine if additional imaging is needed for these.    1) Follow up with your doctor this week  2) Return to the ED immediately for new or worsening symptoms   3) Please continue to take any home medications as prescribed  4) Your test results from your ED visit were discussed with you prior to discharge  5) You were provided with a copy of your test results    Headache    A headache is pain or discomfort felt around the head or neck area. The specific cause of a headache may not be found as there are many types including tension headaches, migraine headaches, and cluster headaches. Watch your condition for any changes. Things you can do to manage your pain include taking over the counter and prescription medications as instructed by your health care provider, lying down in a dark quiet room, limiting stress, getting regular sleep, and refraining from alcohol and tobacco products.    SEEK IMMEDIATE MEDICAL CARE IF YOU HAVE ANY OF THE FOLLOWING SYMPTOMS: fever, vomiting, stiff neck, loss of vision, problems with speech, muscle weakness, loss of balance, trouble walking, passing out, or confusion.     Anemia    Anemia is a condition in which the concentration of red blood cells or hemoglobin in the blood is below normal. Hemoglobin is a substance in red blood cells that carries oxygen to the tissues of the body. Anemia results in not enough oxygen reaching these tissues which can cause symptoms such as weakness, dizziness/lightheadedness, shortness of breath, chest pain, paleness, or nausea. The cause of your anemia may or may not be determined immediately. If your hemoglobin was dangerously low, you may have received a blood transfusion. Usually reactions to transfusions occur immediately but monitor yourself for any fevers, rash, or shortness of breath.    SEEK IMMEDIATE MEDICAL CARE IF YOU HAVE ANY OF THE FOLLOWING SYMPTOMS: extreme weakness/chest pain/shortness of breath, black or bloody stools, vomiting blood, fainting, fever, or any signs of dehydration.     Sarah análisis e imágenes no mostraron ningún hallazgo emergente. Lin ecografía mostró quistes en el ovario derecho e yana. Debe hacer un seguimiento con lin ginecólogo para determinar si se necesitan imágenes adicionales para estos.    1) Maulik un seguimiento con lin médico esta semana  2) Regrese al servicio de urgencias inmediatamente si los síntomas aparecen o empeoran.  3) Continúe tomando los medicamentos caseros según lo recetado.  4) Los resultados de las pruebas de lin visita al servicio de urgencias se analizaron con usted antes del lavon.  5) Se le proporcionó shaiza copia de los resultados de lin prueba.    Dolor de samantha    Un dolor de samantha es un dolor o malestar que se siente alrededor de la samantha o el galdino. Es posible que no se encuentre la causa específica de un dolor de samantha, ya que existen muchos tipos, incluidos los ruth de samantha tensionales, las migrañas y los ruth de samantha en racimos. Observe lin condición para detectar cualquier cambio. Las cosas que puede hacer para controlar lin dolor incluyen kulwinder medicamentos de venta tessie y recetados según las instrucciones de lin proveedor de atención médica, acostarse en shazia habitación oscura y tranquila, limitar el estrés, dormir con regularidad y abstenerse de consumir alcohol y productos de tabaco.    BUSQUE ATENCIÓN MÉDICA INMEDIATA SI TIENE ALGUNO DE LOS SIGUIENTES SÍNTOMAS: fiebre, vómitos, rigidez en el galdino, pérdida de la visión, problemas con el habla, debilidad muscular, pérdida del equilibrio, dificultad para caminar, desmayos o confusión.    Anemia    La anemia es shazia afección en la que la concentración de glóbulos rojos o hemoglobina en la crystal está por debajo de lo normal. La hemoglobina es shazia sustancia de los glóbulos rojos que transporta oxígeno a los tejidos del cuerpo. La anemia hace que no llegue suficiente oxígeno a estos tejidos, lo que puede causar síntomas poppy debilidad, mareos/aturdimiento, dificultad para respirar, dolor en el pecho, palidez o náuseas. La causa de lin anemia puede determinarse o no de inmediato. Si lin hemoglobina estaba peligrosamente baja, es posible que haya recibido shazia transfusión de crystal. Por lo general, las reacciones a las transfusiones ocurren de inmediato, oz controle si tiene fiebre, sarpullido o dificultad para respirar.    BUSQUE ATENCIÓN MÉDICA INMEDIATA SI TIENE ALGUNO DE LOS SIGUIENTES SÍNTOMAS: debilidad extrema/dolor en el pecho/dificultad para respirar, heces negras o con crystal, vómitos con crystal, desmayos, fiebre o cualquier signo de deshidratación.

## 2023-10-19 NOTE — ED ADULT NURSE NOTE - NSFALLUNIVINTERV_ED_ALL_ED
Bed/Stretcher in lowest position, wheels locked, appropriate side rails in place/Call bell, personal items and telephone in reach/Instruct patient to call for assistance before getting out of bed/chair/stretcher/Non-slip footwear applied when patient is off stretcher/[place] to call system/Physically safe environment - no spills, clutter or unnecessary equipment/Purposeful proactive rounding/Room/bathroom lighting operational, light cord in reach

## 2023-10-19 NOTE — ED ADULT NURSE NOTE - LANGUAGE ASSISTANCE NEEDED
pt. speaks and understands basic English/No-Patient/Caregiver offered and refused free interpretation services.

## 2023-10-19 NOTE — ED PROVIDER NOTE - PROGRESS NOTE DETAILS
Attending Caitlin: received sign out pending labs and re-eval. Hb not significantly decreased compared to prior on file. spoke w/ pt,  ID [id number]. pt reports headache is better. reports no further bleeding while in ED. informed pt of test results. recommended f/u w/ Gyn which pt agreed. ready for DC.

## 2023-10-19 NOTE — ED PROVIDER NOTE - OBJECTIVE STATEMENT
53-year-old female with past medical history migraines, anxiety, anemia, menorrhagia presents with multiple medical complaints.  Patient states her menstrual period started 3 days ago, states bleeding was initially heavy with approximately 5-6 pads per day with clots, reports improved bleeding at this time and states she used approximately 1-2 pads since this morning.  Patient also reports her typical migraine symptoms including headache, photophobia, vision changes, and nausea.  Patient states she was seen by OB/GYN earlier today and sent to the emergency department for further evaluation.  Denies any fevers, chest pain, shortness of breath, abdominal pain, vomiting, diarrhea, dysuria, bloody stools, black tarry stools, numbness, focal weakness, rash.  Denies any aspirin or anticoagulation use.  Denies any additional complaints.

## 2023-10-19 NOTE — ED PROVIDER NOTE - NSFOLLOWUPCLINICS_GEN_ALL_ED_FT
Lonny STALLINGS  [facility]  [address] [place] [address]  Phone: [phone]  Fax: [phone]

## 2023-10-19 NOTE — ED ADULT TRIAGE NOTE - CHIEF COMPLAINT QUOTE
Pt came from gynecologist office for vaginal bleed X2 days, changed 4-5 pads/day, S/P first blood transfusion a few weeks ago  c/o headache, hypertension  took 2 excedrin this morning

## 2023-11-03 ENCOUNTER — EMERGENCY (EMERGENCY)
Facility: HOSPITAL | Age: 53
LOS: 1 days | Discharge: HOME | End: 2023-11-03
Attending: EMERGENCY MEDICINE
Payer: MEDICAID

## 2023-11-03 VITALS
HEART RATE: 76 BPM | SYSTOLIC BLOOD PRESSURE: 125 MMHG | TEMPERATURE: 98 F | WEIGHT: 125 LBS | RESPIRATION RATE: 18 BRPM | OXYGEN SATURATION: 99 % | HEIGHT: 59 IN | DIASTOLIC BLOOD PRESSURE: 83 MMHG

## 2023-11-03 DIAGNOSIS — Z98.89 OTHER SPECIFIED POSTPROCEDURAL STATES: Chronic | ICD-10-CM

## 2023-11-03 LAB
ALBUMIN SERPL ELPH-MCNC: 3.1 G/DL — LOW (ref 3.5–5)
ALBUMIN SERPL ELPH-MCNC: 3.1 G/DL — LOW (ref 3.5–5)
ALP SERPL-CCNC: 87 U/L — SIGNIFICANT CHANGE UP (ref 40–120)
ALP SERPL-CCNC: 87 U/L — SIGNIFICANT CHANGE UP (ref 40–120)
ALT FLD-CCNC: 47 U/L DA — SIGNIFICANT CHANGE UP (ref 10–60)
ALT FLD-CCNC: 47 U/L DA — SIGNIFICANT CHANGE UP (ref 10–60)
ANION GAP SERPL CALC-SCNC: 4 MMOL/L — LOW (ref 5–17)
ANION GAP SERPL CALC-SCNC: 4 MMOL/L — LOW (ref 5–17)
AST SERPL-CCNC: 25 U/L — SIGNIFICANT CHANGE UP (ref 10–40)
AST SERPL-CCNC: 25 U/L — SIGNIFICANT CHANGE UP (ref 10–40)
BASOPHILS # BLD AUTO: 0.04 K/UL — SIGNIFICANT CHANGE UP (ref 0–0.2)
BASOPHILS # BLD AUTO: 0.04 K/UL — SIGNIFICANT CHANGE UP (ref 0–0.2)
BASOPHILS NFR BLD AUTO: 0.6 % — SIGNIFICANT CHANGE UP (ref 0–2)
BASOPHILS NFR BLD AUTO: 0.6 % — SIGNIFICANT CHANGE UP (ref 0–2)
BILIRUB SERPL-MCNC: 0.2 MG/DL — SIGNIFICANT CHANGE UP (ref 0.2–1.2)
BILIRUB SERPL-MCNC: 0.2 MG/DL — SIGNIFICANT CHANGE UP (ref 0.2–1.2)
BUN SERPL-MCNC: 15 MG/DL — SIGNIFICANT CHANGE UP (ref 7–18)
BUN SERPL-MCNC: 15 MG/DL — SIGNIFICANT CHANGE UP (ref 7–18)
CALCIUM SERPL-MCNC: 8.4 MG/DL — SIGNIFICANT CHANGE UP (ref 8.4–10.5)
CALCIUM SERPL-MCNC: 8.4 MG/DL — SIGNIFICANT CHANGE UP (ref 8.4–10.5)
CHLORIDE SERPL-SCNC: 110 MMOL/L — HIGH (ref 96–108)
CHLORIDE SERPL-SCNC: 110 MMOL/L — HIGH (ref 96–108)
CO2 SERPL-SCNC: 29 MMOL/L — SIGNIFICANT CHANGE UP (ref 22–31)
CO2 SERPL-SCNC: 29 MMOL/L — SIGNIFICANT CHANGE UP (ref 22–31)
CREAT SERPL-MCNC: 0.54 MG/DL — SIGNIFICANT CHANGE UP (ref 0.5–1.3)
CREAT SERPL-MCNC: 0.54 MG/DL — SIGNIFICANT CHANGE UP (ref 0.5–1.3)
EGFR: 109 ML/MIN/1.73M2 — SIGNIFICANT CHANGE UP
EGFR: 109 ML/MIN/1.73M2 — SIGNIFICANT CHANGE UP
EOSINOPHIL # BLD AUTO: 0.2 K/UL — SIGNIFICANT CHANGE UP (ref 0–0.5)
EOSINOPHIL # BLD AUTO: 0.2 K/UL — SIGNIFICANT CHANGE UP (ref 0–0.5)
EOSINOPHIL NFR BLD AUTO: 3 % — SIGNIFICANT CHANGE UP (ref 0–6)
EOSINOPHIL NFR BLD AUTO: 3 % — SIGNIFICANT CHANGE UP (ref 0–6)
GLUCOSE SERPL-MCNC: 95 MG/DL — SIGNIFICANT CHANGE UP (ref 70–99)
GLUCOSE SERPL-MCNC: 95 MG/DL — SIGNIFICANT CHANGE UP (ref 70–99)
HCT VFR BLD CALC: 29.6 % — LOW (ref 34.5–45)
HCT VFR BLD CALC: 29.6 % — LOW (ref 34.5–45)
HGB BLD-MCNC: 9.1 G/DL — LOW (ref 11.5–15.5)
HGB BLD-MCNC: 9.1 G/DL — LOW (ref 11.5–15.5)
IMM GRANULOCYTES NFR BLD AUTO: 0.1 % — SIGNIFICANT CHANGE UP (ref 0–0.9)
IMM GRANULOCYTES NFR BLD AUTO: 0.1 % — SIGNIFICANT CHANGE UP (ref 0–0.9)
LYMPHOCYTES # BLD AUTO: 2.42 K/UL — SIGNIFICANT CHANGE UP (ref 1–3.3)
LYMPHOCYTES # BLD AUTO: 2.42 K/UL — SIGNIFICANT CHANGE UP (ref 1–3.3)
LYMPHOCYTES # BLD AUTO: 35.8 % — SIGNIFICANT CHANGE UP (ref 13–44)
LYMPHOCYTES # BLD AUTO: 35.8 % — SIGNIFICANT CHANGE UP (ref 13–44)
MAGNESIUM SERPL-MCNC: 2.2 MG/DL — SIGNIFICANT CHANGE UP (ref 1.6–2.6)
MAGNESIUM SERPL-MCNC: 2.2 MG/DL — SIGNIFICANT CHANGE UP (ref 1.6–2.6)
MCHC RBC-ENTMCNC: 25 PG — LOW (ref 27–34)
MCHC RBC-ENTMCNC: 25 PG — LOW (ref 27–34)
MCHC RBC-ENTMCNC: 30.7 GM/DL — LOW (ref 32–36)
MCHC RBC-ENTMCNC: 30.7 GM/DL — LOW (ref 32–36)
MCV RBC AUTO: 81.3 FL — SIGNIFICANT CHANGE UP (ref 80–100)
MCV RBC AUTO: 81.3 FL — SIGNIFICANT CHANGE UP (ref 80–100)
MONOCYTES # BLD AUTO: 0.7 K/UL — SIGNIFICANT CHANGE UP (ref 0–0.9)
MONOCYTES # BLD AUTO: 0.7 K/UL — SIGNIFICANT CHANGE UP (ref 0–0.9)
MONOCYTES NFR BLD AUTO: 10.4 % — SIGNIFICANT CHANGE UP (ref 2–14)
MONOCYTES NFR BLD AUTO: 10.4 % — SIGNIFICANT CHANGE UP (ref 2–14)
NEUTROPHILS # BLD AUTO: 3.39 K/UL — SIGNIFICANT CHANGE UP (ref 1.8–7.4)
NEUTROPHILS # BLD AUTO: 3.39 K/UL — SIGNIFICANT CHANGE UP (ref 1.8–7.4)
NEUTROPHILS NFR BLD AUTO: 50.1 % — SIGNIFICANT CHANGE UP (ref 43–77)
NEUTROPHILS NFR BLD AUTO: 50.1 % — SIGNIFICANT CHANGE UP (ref 43–77)
NRBC # BLD: 0 /100 WBCS — SIGNIFICANT CHANGE UP (ref 0–0)
NRBC # BLD: 0 /100 WBCS — SIGNIFICANT CHANGE UP (ref 0–0)
PLATELET # BLD AUTO: 271 K/UL — SIGNIFICANT CHANGE UP (ref 150–400)
PLATELET # BLD AUTO: 271 K/UL — SIGNIFICANT CHANGE UP (ref 150–400)
POTASSIUM SERPL-MCNC: 4 MMOL/L — SIGNIFICANT CHANGE UP (ref 3.5–5.3)
POTASSIUM SERPL-MCNC: 4 MMOL/L — SIGNIFICANT CHANGE UP (ref 3.5–5.3)
POTASSIUM SERPL-SCNC: 4 MMOL/L — SIGNIFICANT CHANGE UP (ref 3.5–5.3)
POTASSIUM SERPL-SCNC: 4 MMOL/L — SIGNIFICANT CHANGE UP (ref 3.5–5.3)
PROT SERPL-MCNC: 6.3 G/DL — SIGNIFICANT CHANGE UP (ref 6–8.3)
PROT SERPL-MCNC: 6.3 G/DL — SIGNIFICANT CHANGE UP (ref 6–8.3)
RBC # BLD: 3.64 M/UL — LOW (ref 3.8–5.2)
RBC # BLD: 3.64 M/UL — LOW (ref 3.8–5.2)
RBC # FLD: 17.9 % — HIGH (ref 10.3–14.5)
RBC # FLD: 17.9 % — HIGH (ref 10.3–14.5)
SODIUM SERPL-SCNC: 143 MMOL/L — SIGNIFICANT CHANGE UP (ref 135–145)
SODIUM SERPL-SCNC: 143 MMOL/L — SIGNIFICANT CHANGE UP (ref 135–145)
WBC # BLD: 6.76 K/UL — SIGNIFICANT CHANGE UP (ref 3.8–10.5)
WBC # BLD: 6.76 K/UL — SIGNIFICANT CHANGE UP (ref 3.8–10.5)
WBC # FLD AUTO: 6.76 K/UL — SIGNIFICANT CHANGE UP (ref 3.8–10.5)
WBC # FLD AUTO: 6.76 K/UL — SIGNIFICANT CHANGE UP (ref 3.8–10.5)

## 2023-11-03 PROCEDURE — 99284 EMERGENCY DEPT VISIT MOD MDM: CPT

## 2023-11-03 RX ORDER — MECLIZINE HCL 12.5 MG
25 TABLET ORAL ONCE
Refills: 0 | Status: COMPLETED | OUTPATIENT
Start: 2023-11-03 | End: 2023-11-03

## 2023-11-03 RX ORDER — SODIUM CHLORIDE 9 MG/ML
1000 INJECTION INTRAMUSCULAR; INTRAVENOUS; SUBCUTANEOUS ONCE
Refills: 0 | Status: COMPLETED | OUTPATIENT
Start: 2023-11-03 | End: 2023-11-03

## 2023-11-03 RX ORDER — ACETAMINOPHEN 500 MG
1000 TABLET ORAL ONCE
Refills: 0 | Status: COMPLETED | OUTPATIENT
Start: 2023-11-03 | End: 2023-11-03

## 2023-11-03 RX ORDER — METOCLOPRAMIDE HCL 10 MG
10 TABLET ORAL ONCE
Refills: 0 | Status: COMPLETED | OUTPATIENT
Start: 2023-11-03 | End: 2023-11-03

## 2023-11-03 RX ORDER — KETOROLAC TROMETHAMINE 30 MG/ML
30 SYRINGE (ML) INJECTION ONCE
Refills: 0 | Status: DISCONTINUED | OUTPATIENT
Start: 2023-11-03 | End: 2023-11-03

## 2023-11-03 RX ADMIN — Medication 400 MILLIGRAM(S): at 22:59

## 2023-11-03 RX ADMIN — SODIUM CHLORIDE 1000 MILLILITER(S): 9 INJECTION INTRAMUSCULAR; INTRAVENOUS; SUBCUTANEOUS at 23:03

## 2023-11-03 RX ADMIN — Medication 30 MILLIGRAM(S): at 22:59

## 2023-11-03 RX ADMIN — Medication 25 MILLIGRAM(S): at 22:59

## 2023-11-03 RX ADMIN — Medication 10 MILLIGRAM(S): at 23:36

## 2023-11-03 NOTE — ED PROVIDER NOTE - OBJECTIVE STATEMENT
53-year-old female history of migraine headaches history of anxiety, anemia status post iron transfusion yesterday presents to ED with dizziness described as room spinning sensation and feeling lightheaded.  Patient is unsure if it was related to the iron infusion she had yesterday.  Nausea no vomiting no fever no chest pain no shortness of breath.  Patient is reporting a headache associated with her symptoms.

## 2023-11-03 NOTE — ED PROVIDER NOTE - CLINICAL SUMMARY MEDICAL DECISION MAKING FREE TEXT BOX
53-year-old female history of migraine headaches, anemia, anxiety presents with dizziness described as lightheaded and room spinning.  Patient said symptoms began after iron infusion yesterday.  Exam unremarkable possible vertigo.  Will check labs trial of meclizine IV fluids reassess

## 2023-11-03 NOTE — ED ADULT TRIAGE NOTE - CHIEF COMPLAINT QUOTE
dizziness, room spinning , worse when turning the head , started this morning   Had cold like symptoms all week , cough, runny nose .

## 2023-11-03 NOTE — ED PROVIDER NOTE - PATIENT PORTAL LINK FT
You can access the FollowMyHealth Patient Portal offered by [facility] by registering at the following website: [email or web address]. By joining [employer]’s FollowMyHealth portal, you will also be able to view your health information using other applications (apps) compatible with our system.

## 2023-11-03 NOTE — ED PROVIDER NOTE - NSFOLLOWUPINSTRUCTIONS_ED_ALL_ED_FT
Vértigo posicional paroxístico mandy    LO QUE NECESITAS SABER:    El VPPB es shazia afección del oído interno que hace que se sienta mareado repentinamente. Mandy significa que no es grave ni pone en peligro la amarjit. El VPPB es causado por un problema con los nervios y la estructura de lin oído interno. El VPPB ocurre cuando pequeños trozos de calcio se desprenden y se amontonan en greg de los angela del oído interno. Anatomía del oído         INSTRUCCIONES DE DESCARGA:    Regrese al departamento de emergencias si:    Se  saundra un episodio de VPPB y se lesiona.      Tiene un dolor de samantha severo que no desaparece.      Tiene nuevos cambios en lin visión o se siente débil o confundido.      Tiene problemas para oír o tiene zumbidos o zumbidos en los oídos.    Comuníquese con lin proveedor de atención médica si:    Sarah síntomas de VPPB no desaparecen o regresan.      Tiene problemas de equilibrio o se  con frecuencia.      Tiene náuseas o vómitos nuevos o aumentados con vértigo.      Se siente ansioso o deprimido y no quiere salir de casa.      Tiene preguntas o inquietudes sobre lin afección o atención.    Medicamentos:    Se pueden recomendar o recetar medicamentos para tratar los mareos o las náuseas.      [place] lin medicamento según las indicaciones. Comuníquese con lin proveedor de atención médica si fred que lin medicamento no le está ayudando o si tiene efectos secundarios. Dígale de cezar si es alérgico a algún medicamento. Mantenga shazia lista de los medicamentos, vitaminas y hierbas que ruben. Incluya las cantidades, cuándo y por qué las ruben. Lleve la lista o los frascos de pastillas a las visitas de seguimiento. Lleve consigo lin lista de medicamentos en aleyda de shazia emergencia.    Prevenga sarah síntomas:    Trate de evitar movimientos bruscos de la samantha. Levántese y acuéstese lentamente.      Levante y apoye lin samantha cuando se acueste. Coloque almohadas debajo de la parte superior de la espalda y la samantha o descanse en un sillón reclinable.      Cambie de posición con frecuencia cuando esté acostado. Trate de no acostarse con la samantha del mismo lado saundra períodos prolongados. Ruede lentamente.      Use equipo de protección cuando walt en bicicleta o practique deportes. Un nicki ayuda a proteger lin samantha de lesiones.    Maulik un seguimiento con lin proveedor de atención médica según las indicaciones: Es posible que deba regresar en 1 mes para verificar el progreso de lin tratamiento. Escriba sarah preguntas para que recuerde hacerlas saundra sarah visitas.

## 2023-11-04 VITALS
SYSTOLIC BLOOD PRESSURE: 105 MMHG | TEMPERATURE: 98 F | RESPIRATION RATE: 18 BRPM | HEART RATE: 90 BPM | OXYGEN SATURATION: 95 % | DIASTOLIC BLOOD PRESSURE: 67 MMHG

## 2023-11-04 PROCEDURE — 80053 COMPREHEN METABOLIC PANEL: CPT

## 2023-11-04 PROCEDURE — 99284 EMERGENCY DEPT VISIT MOD MDM: CPT | Mod: 25

## 2023-11-04 PROCEDURE — 83735 ASSAY OF MAGNESIUM: CPT

## 2023-11-04 PROCEDURE — 96375 TX/PRO/DX INJ NEW DRUG ADDON: CPT

## 2023-11-04 PROCEDURE — 85025 COMPLETE CBC W/AUTO DIFF WBC: CPT

## 2023-11-04 PROCEDURE — 36415 COLL VENOUS BLD VENIPUNCTURE: CPT

## 2023-11-04 PROCEDURE — 96374 THER/PROPH/DIAG INJ IV PUSH: CPT

## 2023-11-04 RX ORDER — MECLIZINE HCL 12.5 MG
1 TABLET ORAL
Qty: 15 | Refills: 0
Start: 2023-11-04

## 2023-11-04 RX ADMIN — Medication 0.5 MILLIGRAM(S): at 01:13

## 2023-11-04 NOTE — ED ADULT NURSE NOTE - OBJECTIVE STATEMENT
As per pt c/o dizziness. Patient reports worsening dizziness when turning the head. Patient reports cold like symptoms x1 week. Pt reports cough runny nose. No apparent distress noted.

## 2023-11-13 NOTE — ED ADULT TRIAGE NOTE - TEMPERATURE IN FAHRENHEIT (DEGREES F)
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. Pt now has persistent bilateral LE swelling. Discussed with Dr Manuel Moreland who recommends referral to [facility] for follow-up care. Pt agrees. Referral sent to [facility]. 98.2

## 2025-04-04 ENCOUNTER — EMERGENCY (EMERGENCY)
Facility: HOSPITAL | Age: 55
LOS: 1 days | End: 2025-04-04
Attending: EMERGENCY MEDICINE
Payer: COMMERCIAL

## 2025-04-04 VITALS
DIASTOLIC BLOOD PRESSURE: 108 MMHG | WEIGHT: 139.33 LBS | HEART RATE: 86 BPM | TEMPERATURE: 98 F | RESPIRATION RATE: 16 BRPM | SYSTOLIC BLOOD PRESSURE: 175 MMHG | OXYGEN SATURATION: 99 %

## 2025-04-04 DIAGNOSIS — Z98.89 OTHER SPECIFIED POSTPROCEDURAL STATES: Chronic | ICD-10-CM

## 2025-04-04 LAB
ALBUMIN SERPL ELPH-MCNC: 3.7 G/DL — SIGNIFICANT CHANGE UP (ref 3.5–5)
ALP SERPL-CCNC: 96 U/L — SIGNIFICANT CHANGE UP (ref 40–120)
ALT FLD-CCNC: 45 U/L DA — SIGNIFICANT CHANGE UP (ref 10–60)
ANION GAP SERPL CALC-SCNC: 5 MMOL/L — SIGNIFICANT CHANGE UP (ref 5–17)
APPEARANCE UR: ABNORMAL
AST SERPL-CCNC: 27 U/L — SIGNIFICANT CHANGE UP (ref 10–40)
BACTERIA # UR AUTO: ABNORMAL /HPF
BASOPHILS # BLD AUTO: 0.08 K/UL — SIGNIFICANT CHANGE UP (ref 0–0.2)
BASOPHILS NFR BLD AUTO: 0.7 % — SIGNIFICANT CHANGE UP (ref 0–2)
BILIRUB SERPL-MCNC: 0.3 MG/DL — SIGNIFICANT CHANGE UP (ref 0.2–1.2)
BILIRUB UR-MCNC: NEGATIVE — SIGNIFICANT CHANGE UP
BUN SERPL-MCNC: 22 MG/DL — HIGH (ref 7–18)
CALCIUM SERPL-MCNC: 9 MG/DL — SIGNIFICANT CHANGE UP (ref 8.4–10.5)
CHLORIDE SERPL-SCNC: 108 MMOL/L — SIGNIFICANT CHANGE UP (ref 96–108)
CO2 SERPL-SCNC: 27 MMOL/L — SIGNIFICANT CHANGE UP (ref 22–31)
COD CRY URNS QL: PRESENT
COLOR SPEC: YELLOW — SIGNIFICANT CHANGE UP
CREAT SERPL-MCNC: 0.59 MG/DL — SIGNIFICANT CHANGE UP (ref 0.5–1.3)
DIFF PNL FLD: ABNORMAL
EGFR: 106 ML/MIN/1.73M2 — SIGNIFICANT CHANGE UP
EGFR: 106 ML/MIN/1.73M2 — SIGNIFICANT CHANGE UP
EOSINOPHIL # BLD AUTO: 0.19 K/UL — SIGNIFICANT CHANGE UP (ref 0–0.5)
EOSINOPHIL NFR BLD AUTO: 1.7 % — SIGNIFICANT CHANGE UP (ref 0–6)
EPI CELLS # UR: PRESENT
GLUCOSE SERPL-MCNC: 101 MG/DL — HIGH (ref 70–99)
GLUCOSE UR QL: NEGATIVE MG/DL — SIGNIFICANT CHANGE UP
HCG SERPL-ACNC: 2 MIU/ML — SIGNIFICANT CHANGE UP
HCT VFR BLD CALC: 35.7 % — SIGNIFICANT CHANGE UP (ref 34.5–45)
HGB BLD-MCNC: 11.6 G/DL — SIGNIFICANT CHANGE UP (ref 11.5–15.5)
IMM GRANULOCYTES NFR BLD AUTO: 0.4 % — SIGNIFICANT CHANGE UP (ref 0–0.9)
KETONES UR-MCNC: NEGATIVE MG/DL — SIGNIFICANT CHANGE UP
LEUKOCYTE ESTERASE UR-ACNC: ABNORMAL
LIDOCAIN IGE QN: 88 U/L — HIGH (ref 13–75)
LYMPHOCYTES # BLD AUTO: 2.47 K/UL — SIGNIFICANT CHANGE UP (ref 1–3.3)
LYMPHOCYTES # BLD AUTO: 21.7 % — SIGNIFICANT CHANGE UP (ref 13–44)
MCHC RBC-ENTMCNC: 27 PG — SIGNIFICANT CHANGE UP (ref 27–34)
MCHC RBC-ENTMCNC: 32.5 G/DL — SIGNIFICANT CHANGE UP (ref 32–36)
MCV RBC AUTO: 83 FL — SIGNIFICANT CHANGE UP (ref 80–100)
MONOCYTES # BLD AUTO: 0.85 K/UL — SIGNIFICANT CHANGE UP (ref 0–0.9)
MONOCYTES NFR BLD AUTO: 7.5 % — SIGNIFICANT CHANGE UP (ref 2–14)
NEUTROPHILS # BLD AUTO: 7.74 K/UL — HIGH (ref 1.8–7.4)
NEUTROPHILS NFR BLD AUTO: 68 % — SIGNIFICANT CHANGE UP (ref 43–77)
NITRITE UR-MCNC: NEGATIVE — SIGNIFICANT CHANGE UP
NRBC BLD AUTO-RTO: 0 /100 WBCS — SIGNIFICANT CHANGE UP (ref 0–0)
PH UR: 5.5 — SIGNIFICANT CHANGE UP (ref 5–8)
PLATELET # BLD AUTO: 354 K/UL — SIGNIFICANT CHANGE UP (ref 150–400)
POTASSIUM SERPL-MCNC: 4.4 MMOL/L — SIGNIFICANT CHANGE UP (ref 3.5–5.3)
POTASSIUM SERPL-SCNC: 4.4 MMOL/L — SIGNIFICANT CHANGE UP (ref 3.5–5.3)
PROT SERPL-MCNC: 7.1 G/DL — SIGNIFICANT CHANGE UP (ref 6–8.3)
PROT UR-MCNC: ABNORMAL MG/DL
RBC # BLD: 4.3 M/UL — SIGNIFICANT CHANGE UP (ref 3.8–5.2)
RBC # FLD: 13.8 % — SIGNIFICANT CHANGE UP (ref 10.3–14.5)
RBC CASTS # UR COMP ASSIST: 11 /HPF — HIGH (ref 0–4)
SODIUM SERPL-SCNC: 140 MMOL/L — SIGNIFICANT CHANGE UP (ref 135–145)
SP GR SPEC: 1.02 — SIGNIFICANT CHANGE UP (ref 1–1.03)
UROBILINOGEN FLD QL: 0.2 MG/DL — SIGNIFICANT CHANGE UP (ref 0.2–1)
WBC # BLD: 11.37 K/UL — HIGH (ref 3.8–10.5)
WBC # FLD AUTO: 11.37 K/UL — HIGH (ref 3.8–10.5)
WBC UR QL: ABNORMAL /HPF (ref 0–5)

## 2025-04-04 PROCEDURE — 99285 EMERGENCY DEPT VISIT HI MDM: CPT

## 2025-04-04 RX ORDER — MAGNESIUM, ALUMINUM HYDROXIDE 200-200 MG
30 TABLET,CHEWABLE ORAL ONCE
Refills: 0 | Status: COMPLETED | OUTPATIENT
Start: 2025-04-04 | End: 2025-04-04

## 2025-04-04 RX ORDER — ONDANSETRON HCL/PF 4 MG/2 ML
4 VIAL (ML) INJECTION ONCE
Refills: 0 | Status: COMPLETED | OUTPATIENT
Start: 2025-04-04 | End: 2025-04-04

## 2025-04-04 RX ORDER — CEFTRIAXONE 500 MG/1
1000 INJECTION, POWDER, FOR SOLUTION INTRAMUSCULAR; INTRAVENOUS ONCE
Refills: 0 | Status: COMPLETED | OUTPATIENT
Start: 2025-04-04 | End: 2025-04-04

## 2025-04-04 RX ORDER — DIATRIZOATE MEGLUMINE, SODIUM 66 %-10 %
30 VIAL (ML) INJECTION ONCE
Refills: 0 | Status: COMPLETED | OUTPATIENT
Start: 2025-04-04 | End: 2025-04-04

## 2025-04-04 RX ADMIN — Medication 30 MILLILITER(S): at 22:27

## 2025-04-04 RX ADMIN — Medication 4 MILLIGRAM(S): at 22:26

## 2025-04-04 RX ADMIN — CEFTRIAXONE 100 MILLIGRAM(S): 500 INJECTION, POWDER, FOR SOLUTION INTRAMUSCULAR; INTRAVENOUS at 23:25

## 2025-04-04 RX ADMIN — Medication 1000 MILLILITER(S): at 22:26

## 2025-04-04 RX ADMIN — Medication 30 MILLILITER(S): at 22:26

## 2025-04-04 NOTE — ED ADULT NURSE NOTE - SUICIDE SCREENING DEPRESSION
Debora B Magill, MSW     Negative

## 2025-04-04 NOTE — ED ADULT NURSE NOTE - NSFALLHARMRISKINTERV_ED_ALL_ED
Assistance OOB with selected safe patient handling equipment if applicable/Communicate risk of Fall with Harm to all staff, patient, and family/Provide visual cue: red socks, yellow wristband, yellow gown, etc/Reinforce activity limits and safety measures with patient and family/Bed in lowest position, wheels locked, appropriate side rails in place/Call bell, personal items and telephone in reach/Instruct patient to call for assistance before getting out of bed/chair/stretcher/Non-slip footwear applied when patient is off stretcher/[place] to call system/Physically safe environment - no spills, clutter or unnecessary equipment/Purposeful Proactive Rounding/Room/bathroom lighting operational, light cord in reach

## 2025-04-05 VITALS
DIASTOLIC BLOOD PRESSURE: 91 MMHG | HEART RATE: 97 BPM | SYSTOLIC BLOOD PRESSURE: 139 MMHG | RESPIRATION RATE: 16 BRPM | TEMPERATURE: 98 F | OXYGEN SATURATION: 99 %

## 2025-04-05 PROCEDURE — 99284 EMERGENCY DEPT VISIT MOD MDM: CPT | Mod: 25

## 2025-04-05 PROCEDURE — 84702 CHORIONIC GONADOTROPIN TEST: CPT

## 2025-04-05 PROCEDURE — 36415 COLL VENOUS BLD VENIPUNCTURE: CPT

## 2025-04-05 PROCEDURE — 85025 COMPLETE CBC W/AUTO DIFF WBC: CPT

## 2025-04-05 PROCEDURE — 81001 URINALYSIS AUTO W/SCOPE: CPT

## 2025-04-05 PROCEDURE — 87077 CULTURE AEROBIC IDENTIFY: CPT

## 2025-04-05 PROCEDURE — 83690 ASSAY OF LIPASE: CPT

## 2025-04-05 PROCEDURE — 96374 THER/PROPH/DIAG INJ IV PUSH: CPT | Mod: XU

## 2025-04-05 PROCEDURE — 87086 URINE CULTURE/COLONY COUNT: CPT

## 2025-04-05 PROCEDURE — 74177 CT ABD & PELVIS W/CONTRAST: CPT | Mod: MC

## 2025-04-05 PROCEDURE — 80053 COMPREHEN METABOLIC PANEL: CPT

## 2025-04-05 PROCEDURE — 74177 CT ABD & PELVIS W/CONTRAST: CPT | Mod: 26

## 2025-04-05 PROCEDURE — 96375 TX/PRO/DX INJ NEW DRUG ADDON: CPT

## 2025-04-05 RX ORDER — CEFUROXIME SODIUM 1.5 G
1 VIAL (EA) INJECTION
Qty: 14 | Refills: 0
Start: 2025-04-05 | End: 2025-04-11

## 2025-04-05 NOTE — ED PROVIDER NOTE - PROGRESS NOTE DETAILS
Spoke with patient using .  Likely developed acute gastritis from medication and coffee use.  Discussed UTI still present on current encounter.  Will treat with p.o. antibiotics.  Discussed close follow-up with PCP and return for any worsening or persistent symptoms.  [place] [id number] used for encounter. Downtime

## 2025-04-05 NOTE — ED PROVIDER NOTE - OBJECTIVE STATEMENT
54-year-old female history of gastric bypass presenting with epigastric pain after taking sumatriptan on empty stomach around 5 PM.  Patient recently completed course of steroids for migraine headache.  Also completed course of Bactrim for UTI.  Currently reports pain is improved.  Denies any diarrhea, fever, vomiting, chest pain or shortness of breath.

## 2025-04-05 NOTE — ED PROVIDER NOTE - PATIENT PORTAL LINK FT
You can access the FollowMyHealth Patient Portal offered by [facility] by registering at the following website: [email or web address]. By joining [employer]’s FollowMyHealth portal, you will also be able to view your health information using other applications (apps) compatible with our system.

## 2025-04-05 NOTE — ED PROVIDER NOTE - CLINICAL SUMMARY MEDICAL DECISION MAKING FREE TEXT BOX
54-year-old female with history of gastric bypass presenting with epigastric pain after taking medication.  Symptoms suggestive of gastritis.  Plan to check labs provide IV hydration GI cocktail and reassess.

## 2025-04-06 LAB
CULTURE RESULTS: ABNORMAL
SPECIMEN SOURCE: SIGNIFICANT CHANGE UP

## 2025-06-01 ENCOUNTER — EMERGENCY (EMERGENCY)
Facility: HOSPITAL | Age: 55
LOS: 1 days | End: 2025-06-01
Attending: STUDENT IN AN ORGANIZED HEALTH CARE EDUCATION/TRAINING PROGRAM
Payer: COMMERCIAL

## 2025-06-01 VITALS
RESPIRATION RATE: 17 BRPM | OXYGEN SATURATION: 99 % | TEMPERATURE: 98 F | SYSTOLIC BLOOD PRESSURE: 133 MMHG | HEART RATE: 86 BPM | DIASTOLIC BLOOD PRESSURE: 85 MMHG

## 2025-06-01 VITALS
WEIGHT: 136.69 LBS | DIASTOLIC BLOOD PRESSURE: 88 MMHG | RESPIRATION RATE: 16 BRPM | OXYGEN SATURATION: 99 % | HEIGHT: 59 IN | SYSTOLIC BLOOD PRESSURE: 149 MMHG | HEART RATE: 90 BPM | TEMPERATURE: 98 F

## 2025-06-01 DIAGNOSIS — Z98.89 OTHER SPECIFIED POSTPROCEDURAL STATES: Chronic | ICD-10-CM

## 2025-06-01 LAB
ALBUMIN SERPL ELPH-MCNC: 3.6 G/DL — SIGNIFICANT CHANGE UP (ref 3.5–5)
ALP SERPL-CCNC: 91 U/L — SIGNIFICANT CHANGE UP (ref 40–120)
ALT FLD-CCNC: 22 U/L DA — SIGNIFICANT CHANGE UP (ref 10–60)
ANION GAP SERPL CALC-SCNC: 6 MMOL/L — SIGNIFICANT CHANGE UP (ref 5–17)
AST SERPL-CCNC: 18 U/L — SIGNIFICANT CHANGE UP (ref 10–40)
BASOPHILS # BLD AUTO: 0.06 K/UL — SIGNIFICANT CHANGE UP (ref 0–0.2)
BASOPHILS NFR BLD AUTO: 0.9 % — SIGNIFICANT CHANGE UP (ref 0–2)
BILIRUB SERPL-MCNC: 0.3 MG/DL — SIGNIFICANT CHANGE UP (ref 0.2–1.2)
BUN SERPL-MCNC: 13 MG/DL — SIGNIFICANT CHANGE UP (ref 7–18)
CALCIUM SERPL-MCNC: 8.7 MG/DL — SIGNIFICANT CHANGE UP (ref 8.4–10.5)
CHLORIDE SERPL-SCNC: 107 MMOL/L — SIGNIFICANT CHANGE UP (ref 96–108)
CO2 SERPL-SCNC: 25 MMOL/L — SIGNIFICANT CHANGE UP (ref 22–31)
CREAT SERPL-MCNC: 0.57 MG/DL — SIGNIFICANT CHANGE UP (ref 0.5–1.3)
EGFR: 107 ML/MIN/1.73M2 — SIGNIFICANT CHANGE UP
EGFR: 107 ML/MIN/1.73M2 — SIGNIFICANT CHANGE UP
EOSINOPHIL # BLD AUTO: 0.16 K/UL — SIGNIFICANT CHANGE UP (ref 0–0.5)
EOSINOPHIL NFR BLD AUTO: 2.3 % — SIGNIFICANT CHANGE UP (ref 0–6)
FLUAV AG NPH QL: SIGNIFICANT CHANGE UP
FLUBV AG NPH QL: SIGNIFICANT CHANGE UP
GLUCOSE SERPL-MCNC: 104 MG/DL — HIGH (ref 70–99)
HCT VFR BLD CALC: 36.1 % — SIGNIFICANT CHANGE UP (ref 34.5–45)
HGB BLD-MCNC: 11.5 G/DL — SIGNIFICANT CHANGE UP (ref 11.5–15.5)
IMM GRANULOCYTES NFR BLD AUTO: 0.1 % — SIGNIFICANT CHANGE UP (ref 0–0.9)
LYMPHOCYTES # BLD AUTO: 2.06 K/UL — SIGNIFICANT CHANGE UP (ref 1–3.3)
LYMPHOCYTES # BLD AUTO: 30.2 % — SIGNIFICANT CHANGE UP (ref 13–44)
MAGNESIUM SERPL-MCNC: 2.2 MG/DL — SIGNIFICANT CHANGE UP (ref 1.6–2.6)
MCHC RBC-ENTMCNC: 25.3 PG — LOW (ref 27–34)
MCHC RBC-ENTMCNC: 31.9 G/DL — LOW (ref 32–36)
MCV RBC AUTO: 79.5 FL — LOW (ref 80–100)
MONOCYTES # BLD AUTO: 0.35 K/UL — SIGNIFICANT CHANGE UP (ref 0–0.9)
MONOCYTES NFR BLD AUTO: 5.1 % — SIGNIFICANT CHANGE UP (ref 2–14)
NEUTROPHILS # BLD AUTO: 4.19 K/UL — SIGNIFICANT CHANGE UP (ref 1.8–7.4)
NEUTROPHILS NFR BLD AUTO: 61.4 % — SIGNIFICANT CHANGE UP (ref 43–77)
NRBC BLD AUTO-RTO: 0 /100 WBCS — SIGNIFICANT CHANGE UP (ref 0–0)
PLATELET # BLD AUTO: 296 K/UL — SIGNIFICANT CHANGE UP (ref 150–400)
POTASSIUM SERPL-MCNC: 3.7 MMOL/L — SIGNIFICANT CHANGE UP (ref 3.5–5.3)
POTASSIUM SERPL-SCNC: 3.7 MMOL/L — SIGNIFICANT CHANGE UP (ref 3.5–5.3)
PROT SERPL-MCNC: 7.5 G/DL — SIGNIFICANT CHANGE UP (ref 6–8.3)
RBC # BLD: 4.54 M/UL — SIGNIFICANT CHANGE UP (ref 3.8–5.2)
RBC # FLD: 12.9 % — SIGNIFICANT CHANGE UP (ref 10.3–14.5)
RSV RNA NPH QL NAA+NON-PROBE: SIGNIFICANT CHANGE UP
SARS-COV-2 RNA SPEC QL NAA+PROBE: DETECTED
SODIUM SERPL-SCNC: 138 MMOL/L — SIGNIFICANT CHANGE UP (ref 135–145)
SOURCE RESPIRATORY: SIGNIFICANT CHANGE UP
WBC # BLD: 6.83 K/UL — SIGNIFICANT CHANGE UP (ref 3.8–10.5)
WBC # FLD AUTO: 6.83 K/UL — SIGNIFICANT CHANGE UP (ref 3.8–10.5)

## 2025-06-01 PROCEDURE — 87637 SARSCOV2&INF A&B&RSV AMP PRB: CPT

## 2025-06-01 PROCEDURE — 36415 COLL VENOUS BLD VENIPUNCTURE: CPT

## 2025-06-01 PROCEDURE — 71046 X-RAY EXAM CHEST 2 VIEWS: CPT

## 2025-06-01 PROCEDURE — 96374 THER/PROPH/DIAG INJ IV PUSH: CPT

## 2025-06-01 PROCEDURE — 99284 EMERGENCY DEPT VISIT MOD MDM: CPT | Mod: 25

## 2025-06-01 PROCEDURE — 96375 TX/PRO/DX INJ NEW DRUG ADDON: CPT

## 2025-06-01 PROCEDURE — 99284 EMERGENCY DEPT VISIT MOD MDM: CPT

## 2025-06-01 PROCEDURE — 0241U: CPT

## 2025-06-01 PROCEDURE — 71046 X-RAY EXAM CHEST 2 VIEWS: CPT | Mod: 26

## 2025-06-01 PROCEDURE — 80053 COMPREHEN METABOLIC PANEL: CPT

## 2025-06-01 PROCEDURE — 85025 COMPLETE CBC W/AUTO DIFF WBC: CPT

## 2025-06-01 PROCEDURE — 83735 ASSAY OF MAGNESIUM: CPT

## 2025-06-01 RX ORDER — ACETAMINOPHEN 500 MG/5ML
1000 LIQUID (ML) ORAL ONCE
Refills: 0 | Status: COMPLETED | OUTPATIENT
Start: 2025-06-01 | End: 2025-06-01

## 2025-06-01 RX ORDER — FLUCONAZOLE 150 MG
150 TABLET ORAL ONCE
Refills: 0 | Status: COMPLETED | OUTPATIENT
Start: 2025-06-01 | End: 2025-06-01

## 2025-06-01 RX ORDER — BUTALBITAL, ACETAMINOPHEN AND CAFFEINE 50; 325; 40 MG/1; MG/1; MG/1
1 TABLET ORAL ONCE
Refills: 0 | Status: COMPLETED | OUTPATIENT
Start: 2025-06-01 | End: 2025-06-01

## 2025-06-01 RX ORDER — KETOROLAC TROMETHAMINE 30 MG/ML
30 INJECTION, SOLUTION INTRAMUSCULAR; INTRAVENOUS ONCE
Refills: 0 | Status: DISCONTINUED | OUTPATIENT
Start: 2025-06-01 | End: 2025-06-01

## 2025-06-01 RX ORDER — METOCLOPRAMIDE HCL 10 MG
10 TABLET ORAL ONCE
Refills: 0 | Status: COMPLETED | OUTPATIENT
Start: 2025-06-01 | End: 2025-06-01

## 2025-06-01 RX ADMIN — Medication 400 MILLIGRAM(S): at 10:06

## 2025-06-01 RX ADMIN — KETOROLAC TROMETHAMINE 30 MILLIGRAM(S): 30 INJECTION, SOLUTION INTRAMUSCULAR; INTRAVENOUS at 10:06

## 2025-06-01 RX ADMIN — BUTALBITAL, ACETAMINOPHEN AND CAFFEINE 1 CAPSULE(S): 50; 325; 40 TABLET ORAL at 12:14

## 2025-06-01 RX ADMIN — Medication 150 MILLIGRAM(S): at 11:15

## 2025-06-01 RX ADMIN — Medication 1000 MILLILITER(S): at 10:05

## 2025-06-01 RX ADMIN — Medication 10 MILLIGRAM(S): at 10:06

## 2025-06-01 NOTE — ED PROVIDER NOTE - NSFOLLOWUPINSTRUCTIONS_ED_ALL_ED_FT
You were seen in the emergency department for headache and shortness of breath and were found to have Covid.     Please follow-up with your primary care doctor within the next 24-48 hours.    Please take Tylenol and Ibuprofen as prescribed on the bottles for symptom control.     If you have any worsening symptoms, severe headache, chest pain, trouble breathing, please return to the emergency department.

## 2025-06-01 NOTE — ED ADULT NURSE NOTE - BEFAST FACE
Will continue current insulin regimen for now. Will continue monitoring FS, log, will Follow up.  Patient counseled for compliance with consistent low carb diet. No

## 2025-06-01 NOTE — ED ADULT NURSE NOTE - BEFAST BALANCE
[FreeTextEntry1] : \par Dorota presents with c/o of uterovaginal prolapse, frequency and urgency. She has tried pessaries in the past. On exam stage IV uterovaginal prolapse, PVR elevated on exam.. Visual illustrations were used to demonstrate prolapse. We reviewed management options for her prolapse including: observation, pelvic floor exercises with and without physical therapy, pessary, and surgical management. We reviewed the different types of surgical procedures including abdominal, vaginal, and robotic/laparoscopic procedures. In addition we reviewed procedures that involve hysterectomy as well as surgeries with uterine preservation. Mesh and non-mesh options were reviewed. Since she has previously failed a native tissue repair we discussed vaginal closure vs robotic sacral colpopexy. Due to elevated PVR and risk of hydronephrosis pessary placed today (gellhorn 2.5) IUGA information on prolapse and pessary was given to her. She will return in 2 wks for revaluation of PVR and pessary check.  I was able to answer all her questions.\par \par [] pessary trial\par [] if pessary fails surgery- preop needs UDS, cysto, sonogram- closure vs sacral colpopexy\par 
No

## 2025-06-01 NOTE — ED PROVIDER NOTE - OBJECTIVE STATEMENT
55-year-old female, PMH of migraines, presenting with headache, body aches and weakness.  Patient reports for the past several days she has had these symptoms.  Reports her migraines are typically one-sided but the headache is all over her head.  No fever, nausea, vomiting or diarrhea.  Also denies coughing.  Was recently given antibiotics for by her primary care doctor for these symptoms but has not improved.

## 2025-06-01 NOTE — ED PROVIDER NOTE - PATIENT PORTAL LINK FT
You can access the FollowMyHealth Patient Portal offered by [facility] by registering at the following website: [email or web address]. By joining "[employer]’s FollowMyHealth portal, you will also be able to view your health information using other applications (apps) compatible with our system.

## 2025-06-01 NOTE — ED ADULT NURSE NOTE - OBJECTIVE STATEMENT
Patient  present to ED A&OX3 with c/o headache since yesterday worsen , light sensitivity no dizziness or blurred vision, history of migraines, has been tsking tylenol and migraine meds with no relief. Pain level 9/10 with achy throbbing sensation constant

## 2025-06-01 NOTE — ED PROVIDER NOTE - CLINICAL SUMMARY MEDICAL DECISION MAKING FREE TEXT BOX
Emergency Department Resident Note    Patient: Desirae Hargrove Age: 62 year old Sex: female   MRN: [id number] : 1959 Encounter Date: 2022       CHART REVIEW:   Here with dizziness starting 30 minutes prior to arrival.  No headache loss of balance chest pain shortness of breath or weakness.  Episode of dizziness for 1 year since her first Covid vaccine. On aspirin, insulin, spironolactone, statin, lisinopril, history of diabetes, hypertension, morbid obesity.  Has peripheral arterial disease.        HISTORY OF PRESENT ILLNESS:  This is a 62 year old female here for an episode of dizziness as if the room is spinning yesterday at 9 PM prior to presentation.  Lasted 5 seconds.  Was significant/patient presented the emergency room.  She then read to repeat episodes lasting 2 to 3 seconds while in the waiting room.  She has no dizziness on my exam.  She also has had a headache over the past month that is new or different than previous headaches.  Every other day.  Several hours at a time.  Improved by laying flat.  No chest pain abdominal pain sore throat cough nausea vomiting fever diarrhea dysuria urgency hesitancy.      PAST HISTORY       No past medical history on file. No past surgical history on file.          Social History     Tobacco Use   • Smoking status: Not on file   • Smokeless tobacco: Not on file   Substance Use Topics   • Alcohol use: Not on file   • Drug use: Not on file    No family history on file.          Prior to Admission medications    Medication Sig Start Date End Date Taking? Authorizing Provider   amLODIPine (NORVASC) 10 MG tablet Take 10 mg by mouth daily. 2/10/22  Yes Outside Provider   glimepiride (AMARYL) 4 MG tablet Take 4 mg by mouth 2 times daily.  3/14/22  Yes Outside Provider   lisinopril (ZESTRIL) 40 MG tablet Take 1 tablet by mouth daily. 3/14/22  Yes Outside Provider   spironolactone-hydrochlorothiazide (ALDACTAZIDE) 25-25 MG per tablet Take 1 tablet by mouth daily.  3/3/22  Yes Outside Provider   metformin (GLUCOPHAGE) 1000 MG tablet Take 1,000 mg by mouth 2 times daily (before meals). 2/7/22  Yes Outside Provider   aspirin (ECOTRIN) 81 MG EC tablet Take 81 mg by mouth daily.   Yes Outside Provider   atorvastatin (LIPITOR) 40 MG tablet Take 40 mg by mouth every evening.   Yes Outside Provider   meclizine (ANTIVERT) 25 MG tablet Take 1 tablet by mouth 3 times daily as needed for Dizziness. 3/19/22   Adrian Lr    No Known Allergies         REVIEW OF SYSTEMS  Review of Systems   Constitutional:  No fever   Skin:  No rash   Eye:  No vision changes   ENT:  No rhinorrhea   Respiratory:  No cough   Cardiovascular:  No chest pain   Gastrointestinal:  No vomiting   Genitourinary:  No dysuria   Musculoskeletal:  No new weakness   Neurologic: Has headache, dizziness           PHYSICAL EXAMINATION  ED Triage Vitals [03/18/22 2140]   ED Triage Vitals Group      Temp 97.3 °F (36.3 °C)      Heart Rate 87      Resp 16      /80      SpO2 98 %      EtCO2 mmHg       Height       Weight       Weight Scale Used       BMI (Calculated)       IBW/kg (Calculated)      Physical Exam  General: NAD, generally well appearing, high BMI.  Head, eyes, ears, nose, throat: EOMI, Moist mucous membranes  Cardiovascular: Regular rate and rhythm   Pulmonary: No increased work of breathing, lungs are clear to auscultation bilaterally  Gastrointestinal: Abdomen is soft, nontender, nondistended  Genitourinary: No suprapubic tenderness  Musculoskeletal: No clear bony abnormalities  Skin: No obvious lesions  Neuro: Patient is spontaneously moving all limbs with no focal neurologic abnormalities.  Patient has normal coordination.  Nonfocal exam.  Patient able to stand up walk without deficit.  Patient able to stand on one leg and right heel down shin bilaterally without difficult.  No dizziness.  Balance intact.  On laying patient back and turning head there is no nystagmus noted and no dizziness provoked.   Adrian Lr MD  [facility] [id number]  Emergency Medicine, PGY1         Adrian Lr  Resident  03/19/22 1123     55-year-old female presenting with headache and shortness of breath.  Patient breathing comfortably on room air.  Grossly nonfocal neurological exam.  Concern for viral illness versus pneumonia.  Will obtain labs,  IVP and chest x-ray to assess.    Patient found to have COVID-19.  Patient updated on results.  Symptoms moderately improved.  Stable for outpatient follow-up.

## 2025-07-23 ENCOUNTER — EMERGENCY (EMERGENCY)
Facility: HOSPITAL | Age: 55
LOS: 1 days | End: 2025-07-23
Attending: EMERGENCY MEDICINE
Payer: COMMERCIAL

## 2025-07-23 VITALS
HEART RATE: 98 BPM | HEIGHT: 59 IN | WEIGHT: 136.03 LBS | RESPIRATION RATE: 18 BRPM | DIASTOLIC BLOOD PRESSURE: 77 MMHG | SYSTOLIC BLOOD PRESSURE: 134 MMHG | OXYGEN SATURATION: 98 % | TEMPERATURE: 98 F

## 2025-07-23 VITALS
TEMPERATURE: 98 F | OXYGEN SATURATION: 97 % | SYSTOLIC BLOOD PRESSURE: 150 MMHG | HEART RATE: 98 BPM | DIASTOLIC BLOOD PRESSURE: 91 MMHG | RESPIRATION RATE: 18 BRPM

## 2025-07-23 DIAGNOSIS — Z98.89 OTHER SPECIFIED POSTPROCEDURAL STATES: Chronic | ICD-10-CM

## 2025-07-23 LAB
ALBUMIN SERPL ELPH-MCNC: 3.5 G/DL — SIGNIFICANT CHANGE UP (ref 3.5–5)
ALP SERPL-CCNC: 86 U/L — SIGNIFICANT CHANGE UP (ref 40–120)
ALT FLD-CCNC: 27 U/L DA — SIGNIFICANT CHANGE UP (ref 10–60)
ANION GAP SERPL CALC-SCNC: 3 MMOL/L — LOW (ref 5–17)
AST SERPL-CCNC: 20 U/L — SIGNIFICANT CHANGE UP (ref 10–40)
BASOPHILS # BLD AUTO: 0.06 K/UL — SIGNIFICANT CHANGE UP (ref 0–0.2)
BASOPHILS NFR BLD AUTO: 0.7 % — SIGNIFICANT CHANGE UP (ref 0–2)
BILIRUB SERPL-MCNC: 0.2 MG/DL — SIGNIFICANT CHANGE UP (ref 0.2–1.2)
BUN SERPL-MCNC: 20 MG/DL — HIGH (ref 7–18)
CALCIUM SERPL-MCNC: 8.7 MG/DL — SIGNIFICANT CHANGE UP (ref 8.4–10.5)
CHLORIDE SERPL-SCNC: 106 MMOL/L — SIGNIFICANT CHANGE UP (ref 96–108)
CK SERPL-CCNC: 82 U/L — SIGNIFICANT CHANGE UP (ref 21–215)
CO2 SERPL-SCNC: 28 MMOL/L — SIGNIFICANT CHANGE UP (ref 22–31)
CREAT SERPL-MCNC: 0.5 MG/DL — SIGNIFICANT CHANGE UP (ref 0.5–1.3)
EGFR: 110 ML/MIN/1.73M2 — SIGNIFICANT CHANGE UP
EGFR: 110 ML/MIN/1.73M2 — SIGNIFICANT CHANGE UP
EOSINOPHIL # BLD AUTO: 0.03 K/UL — SIGNIFICANT CHANGE UP (ref 0–0.5)
EOSINOPHIL NFR BLD AUTO: 0.4 % — SIGNIFICANT CHANGE UP (ref 0–6)
FLUAV AG NPH QL: SIGNIFICANT CHANGE UP
FLUBV AG NPH QL: SIGNIFICANT CHANGE UP
GLUCOSE SERPL-MCNC: 108 MG/DL — HIGH (ref 70–99)
HCT VFR BLD CALC: 32.6 % — LOW (ref 34.5–45)
HGB BLD-MCNC: 10.3 G/DL — LOW (ref 11.5–15.5)
IMM GRANULOCYTES NFR BLD AUTO: 0.1 % — SIGNIFICANT CHANGE UP (ref 0–0.9)
LACTATE SERPL-SCNC: 0.9 MMOL/L — SIGNIFICANT CHANGE UP (ref 0.7–2)
LYMPHOCYTES # BLD AUTO: 2.42 K/UL — SIGNIFICANT CHANGE UP (ref 1–3.3)
LYMPHOCYTES # BLD AUTO: 29 % — SIGNIFICANT CHANGE UP (ref 13–44)
MAGNESIUM SERPL-MCNC: 2.1 MG/DL — SIGNIFICANT CHANGE UP (ref 1.6–2.6)
MCHC RBC-ENTMCNC: 24.4 PG — LOW (ref 27–34)
MCHC RBC-ENTMCNC: 31.6 G/DL — LOW (ref 32–36)
MCV RBC AUTO: 77.3 FL — LOW (ref 80–100)
MONOCYTES # BLD AUTO: 0.41 K/UL — SIGNIFICANT CHANGE UP (ref 0–0.9)
MONOCYTES NFR BLD AUTO: 4.9 % — SIGNIFICANT CHANGE UP (ref 2–14)
NEUTROPHILS # BLD AUTO: 5.42 K/UL — SIGNIFICANT CHANGE UP (ref 1.8–7.4)
NEUTROPHILS NFR BLD AUTO: 64.9 % — SIGNIFICANT CHANGE UP (ref 43–77)
NRBC BLD AUTO-RTO: 0 /100 WBCS — SIGNIFICANT CHANGE UP (ref 0–0)
PLATELET # BLD AUTO: 299 K/UL — SIGNIFICANT CHANGE UP (ref 150–400)
POTASSIUM SERPL-MCNC: 3.9 MMOL/L — SIGNIFICANT CHANGE UP (ref 3.5–5.3)
POTASSIUM SERPL-SCNC: 3.9 MMOL/L — SIGNIFICANT CHANGE UP (ref 3.5–5.3)
PROT SERPL-MCNC: 6.8 G/DL — SIGNIFICANT CHANGE UP (ref 6–8.3)
RBC # BLD: 4.22 M/UL — SIGNIFICANT CHANGE UP (ref 3.8–5.2)
RBC # FLD: 14.8 % — HIGH (ref 10.3–14.5)
RSV RNA NPH QL NAA+NON-PROBE: SIGNIFICANT CHANGE UP
SARS-COV-2 RNA SPEC QL NAA+PROBE: SIGNIFICANT CHANGE UP
SODIUM SERPL-SCNC: 137 MMOL/L — SIGNIFICANT CHANGE UP (ref 135–145)
SOURCE RESPIRATORY: SIGNIFICANT CHANGE UP
TROPONIN I, HIGH SENSITIVITY RESULT: 4.9 NG/L — SIGNIFICANT CHANGE UP
WBC # BLD: 8.35 K/UL — SIGNIFICANT CHANGE UP (ref 3.8–10.5)
WBC # FLD AUTO: 8.35 K/UL — SIGNIFICANT CHANGE UP (ref 3.8–10.5)

## 2025-07-23 PROCEDURE — 87637 SARSCOV2&INF A&B&RSV AMP PRB: CPT

## 2025-07-23 PROCEDURE — 84484 ASSAY OF TROPONIN QUANT: CPT

## 2025-07-23 PROCEDURE — 82550 ASSAY OF CK (CPK): CPT

## 2025-07-23 PROCEDURE — 83735 ASSAY OF MAGNESIUM: CPT

## 2025-07-23 PROCEDURE — 80053 COMPREHEN METABOLIC PANEL: CPT

## 2025-07-23 PROCEDURE — 36415 COLL VENOUS BLD VENIPUNCTURE: CPT

## 2025-07-23 PROCEDURE — 71046 X-RAY EXAM CHEST 2 VIEWS: CPT | Mod: 26

## 2025-07-23 PROCEDURE — 85025 COMPLETE CBC W/AUTO DIFF WBC: CPT

## 2025-07-23 PROCEDURE — 71046 X-RAY EXAM CHEST 2 VIEWS: CPT

## 2025-07-23 PROCEDURE — 83605 ASSAY OF LACTIC ACID: CPT

## 2025-07-23 PROCEDURE — 99285 EMERGENCY DEPT VISIT HI MDM: CPT

## 2025-07-23 RX ORDER — AZITHROMYCIN 250 MG
500 CAPSULE ORAL ONCE
Refills: 0 | Status: COMPLETED | OUTPATIENT
Start: 2025-07-23 | End: 2025-07-23

## 2025-07-23 RX ORDER — CEFTRIAXONE 500 MG/1
1000 INJECTION, POWDER, FOR SOLUTION INTRAMUSCULAR; INTRAVENOUS ONCE
Refills: 0 | Status: COMPLETED | OUTPATIENT
Start: 2025-07-23 | End: 2025-07-23

## 2025-07-23 RX ADMIN — Medication 250 MILLIGRAM(S): at 20:07

## 2025-07-23 RX ADMIN — CEFTRIAXONE 100 MILLIGRAM(S): 500 INJECTION, POWDER, FOR SOLUTION INTRAMUSCULAR; INTRAVENOUS at 19:57
